# Patient Record
Sex: MALE | Race: WHITE | NOT HISPANIC OR LATINO | Employment: UNEMPLOYED | URBAN - METROPOLITAN AREA
[De-identification: names, ages, dates, MRNs, and addresses within clinical notes are randomized per-mention and may not be internally consistent; named-entity substitution may affect disease eponyms.]

---

## 2023-09-15 ENCOUNTER — OFFICE VISIT (OUTPATIENT)
Dept: PRIMARY CARE | Facility: CLINIC | Age: 58
End: 2023-09-15
Payer: COMMERCIAL

## 2023-09-15 VITALS
SYSTOLIC BLOOD PRESSURE: 130 MMHG | DIASTOLIC BLOOD PRESSURE: 90 MMHG | HEART RATE: 86 BPM | TEMPERATURE: 98.6 F | RESPIRATION RATE: 18 BRPM | WEIGHT: 217 LBS | BODY MASS INDEX: 28.76 KG/M2 | HEIGHT: 73 IN | OXYGEN SATURATION: 96 %

## 2023-09-15 DIAGNOSIS — B37.31 VAGINAL CANDIDIASIS: ICD-10-CM

## 2023-09-15 DIAGNOSIS — M79.10 MUSCLE PAIN: ICD-10-CM

## 2023-09-15 DIAGNOSIS — R06.81 WITNESSED EPISODE OF APNEA: ICD-10-CM

## 2023-09-15 DIAGNOSIS — M62.838 CERVICAL PARASPINOUS MUSCLE SPASM: ICD-10-CM

## 2023-09-15 DIAGNOSIS — N52.9 ERECTILE DYSFUNCTION, UNSPECIFIED ERECTILE DYSFUNCTION TYPE: ICD-10-CM

## 2023-09-15 DIAGNOSIS — I51.9 HEART DISEASE: ICD-10-CM

## 2023-09-15 DIAGNOSIS — E03.9 HYPOTHYROIDISM, UNSPECIFIED TYPE: ICD-10-CM

## 2023-09-15 DIAGNOSIS — M62.838 MUSCLE SPASM: Primary | ICD-10-CM

## 2023-09-15 DIAGNOSIS — I10 PRIMARY HYPERTENSION: ICD-10-CM

## 2023-09-15 DIAGNOSIS — R21 RASH: ICD-10-CM

## 2023-09-15 DIAGNOSIS — F17.210 CIGARETTE NICOTINE DEPENDENCE WITHOUT COMPLICATION: ICD-10-CM

## 2023-09-15 DIAGNOSIS — Z23 FLU VACCINE NEED: ICD-10-CM

## 2023-09-15 DIAGNOSIS — Z23 NEED FOR SHINGLES VACCINE: ICD-10-CM

## 2023-09-15 DIAGNOSIS — R07.9 CHEST PAIN ON EXERTION: ICD-10-CM

## 2023-09-15 DIAGNOSIS — E78.5 HYPERLIPIDEMIA, UNSPECIFIED HYPERLIPIDEMIA TYPE: ICD-10-CM

## 2023-09-15 DIAGNOSIS — Z00.00 ANNUAL PHYSICAL EXAM: ICD-10-CM

## 2023-09-15 DIAGNOSIS — Z12.11 COLON CANCER SCREENING: ICD-10-CM

## 2023-09-15 DIAGNOSIS — Z23 NEED FOR TD VACCINE: ICD-10-CM

## 2023-09-15 PROBLEM — E07.9 THYROID DISEASE: Status: ACTIVE | Noted: 2023-09-15

## 2023-09-15 PROCEDURE — 99386 PREV VISIT NEW AGE 40-64: CPT | Performed by: PHYSICIAN ASSISTANT

## 2023-09-15 PROCEDURE — 3075F SYST BP GE 130 - 139MM HG: CPT | Performed by: PHYSICIAN ASSISTANT

## 2023-09-15 PROCEDURE — 3080F DIAST BP >= 90 MM HG: CPT | Performed by: PHYSICIAN ASSISTANT

## 2023-09-15 PROCEDURE — 90686 IIV4 VACC NO PRSV 0.5 ML IM: CPT | Performed by: PHYSICIAN ASSISTANT

## 2023-09-15 PROCEDURE — 3079F DIAST BP 80-89 MM HG: CPT | Performed by: PHYSICIAN ASSISTANT

## 2023-09-15 PROCEDURE — 90471 IMMUNIZATION ADMIN: CPT | Performed by: PHYSICIAN ASSISTANT

## 2023-09-15 RX ORDER — CYCLOBENZAPRINE HCL 10 MG
10 TABLET ORAL 3 TIMES DAILY PRN
Qty: 30 TABLET | Refills: 3 | Status: SHIPPED | OUTPATIENT
Start: 2023-09-15 | End: 2023-10-25

## 2023-09-15 RX ORDER — NITROGLYCERIN 0.3 MG/1
0.3 TABLET SUBLINGUAL EVERY 5 MIN PRN
COMMUNITY
Start: 2023-01-10 | End: 2023-10-25 | Stop reason: SDUPTHER

## 2023-09-15 RX ORDER — LISINOPRIL 20 MG/1
1 TABLET ORAL DAILY
COMMUNITY
Start: 2023-03-23 | End: 2023-10-25 | Stop reason: SDUPTHER

## 2023-09-15 RX ORDER — FLUCONAZOLE 150 MG/1
TABLET ORAL
Qty: 4 TABLET | Refills: 0 | Status: SHIPPED | OUTPATIENT
Start: 2023-09-15 | End: 2023-10-25

## 2023-09-15 RX ORDER — ATORVASTATIN CALCIUM 80 MG/1
80 TABLET, FILM COATED ORAL DAILY
COMMUNITY
Start: 2023-03-21 | End: 2023-10-25 | Stop reason: SDUPTHER

## 2023-09-15 RX ORDER — ASPIRIN 81 MG/1
81 TABLET ORAL
COMMUNITY
Start: 2023-03-21 | End: 2023-10-25 | Stop reason: SDUPTHER

## 2023-09-15 RX ORDER — LEVOTHYROXINE SODIUM 75 UG/1
75 TABLET ORAL DAILY
COMMUNITY
Start: 2023-03-12 | End: 2023-10-23 | Stop reason: SDUPTHER

## 2023-09-15 ASSESSMENT — ENCOUNTER SYMPTOMS
COUGH: 1
ARTHRALGIAS: 1
DYSPHORIC MOOD: 1
MYALGIAS: 1
SHORTNESS OF BREATH: 1
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
## 2023-09-27 ENCOUNTER — HOSPITAL ENCOUNTER (EMERGENCY)
Age: 58
Discharge: HOME | End: 2023-09-28
Attending: STUDENT IN AN ORGANIZED HEALTH CARE EDUCATION/TRAINING PROGRAM
Payer: COMMERCIAL

## 2023-09-27 DIAGNOSIS — F17.200 HEAVY TOBACCO SMOKER: ICD-10-CM

## 2023-09-27 DIAGNOSIS — J06.9 VIRAL UPPER RESPIRATORY TRACT INFECTION: Primary | ICD-10-CM

## 2023-09-27 DIAGNOSIS — R05.1 ACUTE COUGH: ICD-10-CM

## 2023-09-27 DIAGNOSIS — R06.2 WHEEZING: ICD-10-CM

## 2023-09-27 LAB
INFLUENZA A BY PCR: NEGATIVE
INFLUENZA B BY PCR: NEGATIVE
SARS-COV-2 RDRP RESP QL NAA+PROBE: NOT DETECTED
STREP GRP A PCR: NEGATIVE

## 2023-09-27 PROCEDURE — 99283 EMERGENCY DEPT VISIT LOW MDM: CPT

## 2023-09-27 PROCEDURE — 87651 STREP A DNA AMP PROBE: CPT

## 2023-09-27 PROCEDURE — 87635 SARS-COV-2 COVID-19 AMP PRB: CPT

## 2023-09-27 PROCEDURE — 87502 INFLUENZA DNA AMP PROBE: CPT

## 2023-09-27 ASSESSMENT — PAIN - FUNCTIONAL ASSESSMENT: PAIN_FUNCTIONAL_ASSESSMENT: 0-10

## 2023-09-27 ASSESSMENT — PAIN SCALES - GENERAL: PAINLEVEL_OUTOF10: 3

## 2023-09-27 ASSESSMENT — PAIN DESCRIPTION - DESCRIPTORS: DESCRIPTORS: ACHING

## 2023-09-27 ASSESSMENT — PAIN DESCRIPTION - LOCATION: LOCATION: GENERALIZED

## 2023-09-28 VITALS
BODY MASS INDEX: 28.76 KG/M2 | SYSTOLIC BLOOD PRESSURE: 145 MMHG | TEMPERATURE: 98.6 F | HEIGHT: 73 IN | DIASTOLIC BLOOD PRESSURE: 99 MMHG | HEART RATE: 80 BPM | OXYGEN SATURATION: 98 % | RESPIRATION RATE: 18 BRPM | WEIGHT: 217 LBS

## 2023-09-28 PROCEDURE — 6370000000 HC RX 637 (ALT 250 FOR IP): Performed by: STUDENT IN AN ORGANIZED HEALTH CARE EDUCATION/TRAINING PROGRAM

## 2023-09-28 RX ORDER — ACETAMINOPHEN 500 MG
1000 TABLET ORAL EVERY 6 HOURS PRN
Qty: 60 TABLET | Refills: 0 | Status: SHIPPED | OUTPATIENT
Start: 2023-09-28

## 2023-09-28 RX ORDER — DEXAMETHASONE 6 MG/1
12 TABLET ORAL ONCE
Status: COMPLETED | OUTPATIENT
Start: 2023-09-28 | End: 2023-09-28

## 2023-09-28 RX ORDER — ALBUTEROL SULFATE 90 UG/1
2 AEROSOL, METERED RESPIRATORY (INHALATION) 4 TIMES DAILY PRN
Qty: 18 G | Refills: 0 | Status: SHIPPED | OUTPATIENT
Start: 2023-09-28

## 2023-09-28 RX ORDER — DOXYCYCLINE HYCLATE 100 MG/1
100 CAPSULE ORAL ONCE
Status: COMPLETED | OUTPATIENT
Start: 2023-09-28 | End: 2023-09-28

## 2023-09-28 RX ORDER — ALBUTEROL SULFATE 90 UG/1
2 AEROSOL, METERED RESPIRATORY (INHALATION) ONCE
Status: SHIPPED | OUTPATIENT
Start: 2023-09-28

## 2023-09-28 RX ORDER — IBUPROFEN 800 MG/1
400 TABLET ORAL ONCE
Status: COMPLETED | OUTPATIENT
Start: 2023-09-28 | End: 2023-09-28

## 2023-09-28 RX ORDER — PREDNISONE 20 MG/1
60 TABLET ORAL DAILY
Qty: 15 TABLET | Refills: 0 | Status: SHIPPED | OUTPATIENT
Start: 2023-09-28 | End: 2023-10-03

## 2023-09-28 RX ORDER — ALBUTEROL SULFATE 90 UG/1
2 AEROSOL, METERED RESPIRATORY (INHALATION) ONCE
Status: COMPLETED | OUTPATIENT
Start: 2023-09-28 | End: 2023-09-28

## 2023-09-28 RX ORDER — GUAIFENESIN/DEXTROMETHORPHAN 100-10MG/5
5 SYRUP ORAL 3 TIMES DAILY PRN
Qty: 120 ML | Refills: 0 | Status: SHIPPED | OUTPATIENT
Start: 2023-09-28 | End: 2023-10-08

## 2023-09-28 RX ORDER — DOXYCYCLINE HYCLATE 100 MG
100 TABLET ORAL 2 TIMES DAILY
Qty: 10 TABLET | Refills: 0 | Status: SHIPPED | OUTPATIENT
Start: 2023-09-28 | End: 2023-10-03

## 2023-09-28 RX ORDER — IBUPROFEN 400 MG/1
400 TABLET ORAL EVERY 6 HOURS PRN
Qty: 120 TABLET | Refills: 0 | Status: SHIPPED | OUTPATIENT
Start: 2023-09-28

## 2023-09-28 RX ORDER — ACETAMINOPHEN 500 MG
1000 TABLET ORAL ONCE
Status: COMPLETED | OUTPATIENT
Start: 2023-09-28 | End: 2023-09-28

## 2023-09-28 RX ORDER — GUAIFENESIN/DEXTROMETHORPHAN 100-10MG/5
5 SYRUP ORAL ONCE
Status: COMPLETED | OUTPATIENT
Start: 2023-09-28 | End: 2023-09-28

## 2023-09-28 RX ADMIN — IBUPROFEN 400 MG: 800 TABLET, FILM COATED ORAL at 00:32

## 2023-09-28 RX ADMIN — ALUMINUM HYDROXIDE, MAGNESIUM HYDROXIDE, AND SIMETHICONE: 200; 200; 20 SUSPENSION ORAL at 00:36

## 2023-09-28 RX ADMIN — ALBUTEROL SULFATE 2 PUFF: 90 AEROSOL, METERED RESPIRATORY (INHALATION) at 01:06

## 2023-09-28 RX ADMIN — ACETAMINOPHEN 1000 MG: 500 TABLET ORAL at 00:34

## 2023-09-28 RX ADMIN — DOXYCYCLINE HYCLATE 100 MG: 100 CAPSULE ORAL at 00:34

## 2023-09-28 RX ADMIN — GUAIFENESIN SYRUP AND DEXTROMETHORPHAN 5 ML: 100; 10 SYRUP ORAL at 00:35

## 2023-09-28 RX ADMIN — DEXAMETHASONE 12 MG: 6 TABLET ORAL at 00:34

## 2023-09-28 ASSESSMENT — PAIN DESCRIPTION - LOCATION: LOCATION: GENERALIZED

## 2023-09-28 ASSESSMENT — PAIN SCALES - GENERAL: PAINLEVEL_OUTOF10: 3

## 2023-09-28 NOTE — ED TRIAGE NOTES
Pt reports generalized illness (cough, congestion, body aches, chest congestion.) States he would like \"to rule out Covid\". No medications taken PTA, pt states, \"I just like to let nature take its course\".

## 2023-09-28 NOTE — ED NOTES
Patient alert and oriented at time of discharge. Patient skin p,w,d. Patient respirations are even and unlabored at time of discharge. Patient able to speak in complete sentences with no difficulty at time of discharge. Discharge instructions reviewed with patient. Patient verbalized understanding and states no questions at this time. Patient ambulatory on discharge.       Magda Enciso RN  09/28/23 8664

## 2023-10-04 ENCOUNTER — HOSPITAL ENCOUNTER (EMERGENCY)
Age: 58
Discharge: LEFT BEFORE DISCHARGE | End: 2023-10-04
Payer: COMMERCIAL

## 2023-10-04 VITALS
RESPIRATION RATE: 18 BRPM | HEART RATE: 92 BPM | OXYGEN SATURATION: 97 % | TEMPERATURE: 98.5 F | SYSTOLIC BLOOD PRESSURE: 180 MMHG | DIASTOLIC BLOOD PRESSURE: 81 MMHG

## 2023-10-04 LAB — STREP GRP A PCR: NEGATIVE

## 2023-10-04 PROCEDURE — 87651 STREP A DNA AMP PROBE: CPT

## 2023-10-04 ASSESSMENT — PAIN DESCRIPTION - ONSET: ONSET: ON-GOING

## 2023-10-04 ASSESSMENT — PAIN DESCRIPTION - FREQUENCY: FREQUENCY: CONTINUOUS

## 2023-10-04 ASSESSMENT — PAIN DESCRIPTION - LOCATION: LOCATION: OTHER (COMMENT)

## 2023-10-04 ASSESSMENT — PAIN SCALES - GENERAL: PAINLEVEL_OUTOF10: 3

## 2023-10-04 ASSESSMENT — PAIN DESCRIPTION - DESCRIPTORS: DESCRIPTORS: DISCOMFORT

## 2023-10-05 NOTE — ED NOTES
Pt c/o sore throat, and tongue splitting on the top of tongue.      Mark Anthony Blackburn  10/04/23 2038

## 2023-10-05 NOTE — ED TRIAGE NOTES
A & Ox4. Skin pink warm and dry. Pts uvula is red and swollen. Able to swallow. Also noted to have a small fissure down center of tongue. No bleeding noted.

## 2023-10-06 NOTE — ED PROVIDER NOTES
[facility] ED  eMERGENCY dEPARTMENT eNCOUnter      Pt Name: Rik Sargent  MRN: [id number]  [address] [facility] 1965  Date of evaluation: 9/27/2023  Provider: Shameka Laboy MD    HISTORY OF PRESENT ILLNESS      Chief Complaint   Patient presents with    Generalized Body Aches    general illness     Pt asking for Covid test       The history is provided by the Patient. Rik Sargent is a 62 y.o. male with a PMH clinically significant for Tobacco smoking, HTN, HLD, and Hypothyroidism presenting to the ED via PV c/o generalized illness with nonproductive cough, rinhorrhea, congestion, sore throat and generalized maylgias/fatigue. States symptoms onset just yesterday. Would like to make sure he doesn't have covid. Denies any associated: F/C/D, HA, Neck pain/stiffness, Lightheadedness, Dizziness, Hemoptysis, SOB, CP, Abd pain, N/V/D/C, Dysuria, Hematuria, Difficulty urinating, or Rash. Alleviating Factors: Nothing, nothing yet tried for relief PTA. States they have otherwise been feeling well. Per Chart Review: PMH as noted above obtained via outpatient chart review. REVIEW OF SYSTEMS       Review of Systems  Otherwise as noted in HPI    PAST MEDICAL HISTORY     Past Medical History:   Diagnosis Date    Hyperlipidemia     Hypertension     Thyroid disease        SURGICAL HISTORY       Past Surgical History:   Procedure Laterality Date    HERNIA REPAIR         FAMILY HISTORY     History reviewed. No pertinent family history.     SOCIAL HISTORY       Social History     Socioeconomic History    Marital status:      Spouse name: None    Number of children: None    Years of education: None    Highest education level: None   Tobacco Use    Smoking status: Every Day     Packs/day: .5     Types: Cigarettes    Smokeless tobacco: Never   Substance and Sexual Activity    Alcohol use: Never    Drug use: Never       CURRENT MEDICATIONS       Discharge Medication List as of 9/28/2023 12:29 AM

## 2023-10-14 ENCOUNTER — HOSPITAL ENCOUNTER (EMERGENCY)
Age: 58
Discharge: HOME | End: 2023-10-14
Attending: EMERGENCY MEDICINE
Payer: COMMERCIAL

## 2023-10-14 VITALS
HEIGHT: 73 IN | HEART RATE: 86 BPM | SYSTOLIC BLOOD PRESSURE: 136 MMHG | OXYGEN SATURATION: 99 % | DIASTOLIC BLOOD PRESSURE: 89 MMHG | BODY MASS INDEX: 28.89 KG/M2 | RESPIRATION RATE: 17 BRPM | TEMPERATURE: 98.4 F | WEIGHT: 218 LBS
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
## 2023-10-14 DIAGNOSIS — S01.511A LIP LACERATION, INITIAL ENCOUNTER: Primary | ICD-10-CM

## 2023-10-14 PROCEDURE — 6370000000 HC RX 637 (ALT 250 FOR IP): Performed by: EMERGENCY MEDICINE

## 2023-10-14 PROCEDURE — 99283 EMERGENCY DEPT VISIT LOW MDM: CPT

## 2023-10-14 PROCEDURE — 12011 RPR F/E/E/N/L/M 2.5 CM/<: CPT

## 2023-10-14 RX ORDER — BACITRACIN ZINC 500 [USP'U]/G
OINTMENT TOPICAL ONCE
Status: COMPLETED | OUTPATIENT
Start: 2023-10-14 | End: 2023-10-14

## 2023-10-14 RX ORDER — CLINDAMYCIN HYDROCHLORIDE 300 MG/1
300 CAPSULE ORAL 3 TIMES DAILY
Qty: 21 CAPSULE | Refills: 0 | Status: SHIPPED | OUTPATIENT
Start: 2023-10-14 | End: 2023-10-21

## 2023-10-14 RX ADMIN — BACITRACIN ZINC: 500 OINTMENT TOPICAL at 05:24

## 2023-10-14 ASSESSMENT — ENCOUNTER SYMPTOMS
VOMITING: 0
CHEST TIGHTNESS: 0
ABDOMINAL PAIN: 0
PHOTOPHOBIA: 0
WHEEZING: 0
COUGH: 0
EYE DISCHARGE: 0
SORE THROAT: 0
SHORTNESS OF BREATH: 0
ABDOMINAL DISTENTION: 0

## 2023-10-14 ASSESSMENT — LIFESTYLE VARIABLES
HOW MANY STANDARD DRINKS CONTAINING ALCOHOL DO YOU HAVE ON A TYPICAL DAY: PATIENT DOES NOT DRINK
HOW OFTEN DO YOU HAVE A DRINK CONTAINING ALCOHOL: NEVER

## 2023-10-14 NOTE — ED PROVIDER NOTES
[facility] ED  eMERGENCY dEPARTMENT eNCOUnter      Pt Name: Georgeanna Moritz  MRN: [id number]  [address] [facility] [place] 1965  Date of evaluation: 10/14/2023  Provider: Brigido Martins MD    CHIEF COMPLAINT       Chief Complaint   Patient presents with    Animal Bite     Lip          HISTORY OF PRESENT ILLNESS   (Location/Symptom, Timing/Onset,Context/Setting, Quality, Duration, Modifying Factors, Severity)  Note limiting factors. Georgeanna Moritz is a 62 y.o. male who presents to the emergency department for laceration. Patient was \"messing around\" with his dog. Dog nipped at him and sustained a 2.1 cm laceration. This occurred just prior to arrival.  Dog has immunizations lives in the house. He actually states he was blowing in the dog's ear and the dog just responded with a nip. HPI    NursingNotes were reviewed. REVIEW OF SYSTEMS    (2-9 systems for level 4, 10 or more for level 5)     Review of Systems   Constitutional:  Negative for chills and diaphoresis. HENT:  Negative for congestion, ear pain, mouth sores and sore throat. Eyes:  Negative for photophobia and discharge. Respiratory:  Negative for cough, chest tightness, shortness of breath and wheezing. Cardiovascular:  Negative for chest pain and palpitations. Gastrointestinal:  Negative for abdominal distention, abdominal pain and vomiting. Endocrine: Negative for cold intolerance. Genitourinary:  Negative for difficulty urinating. Musculoskeletal:  Negative for arthralgias. Skin:  Positive for wound. Negative for pallor and rash. Allergic/Immunologic: Negative for immunocompromised state. Neurological:  Negative for dizziness and syncope. Hematological:  Negative for adenopathy. Psychiatric/Behavioral:  Negative for agitation and hallucinations. All other systems reviewed and are negative. Except as noted above the remainder of the review of systems was reviewed and negative.        PAST MEDICAL HISTORY     Past confirmed:  Verbally with patient  Anesthesia:     Anesthesia method:  Local infiltration    Local anesthetic:  Lidocaine 1% WITH epi  Laceration details:     Location:  Lip    Lip location:  Lower exterior lip    Length (cm):  2.1  Pre-procedure details:     Preparation:  Patient was prepped and draped in usual sterile fashion  Exploration:     Limited defect created (wound extended): no      Contaminated: no    Treatment:     Area cleansed with:  Saline    Amount of cleaning:  Extensive    Irrigation solution:  Sterile saline    Irrigation method:  Syringe    Visualized foreign bodies/material removed: no      Debridement:  None    Undermining:  None    Scar revision: no    Skin repair:     Repair method:  Sutures    Suture size:  5-0    Suture material:  Nylon    Suture technique:  Simple interrupted    Number of sutures:  5  Approximation:     Approximation:  Close    Vermilion border well-aligned: yes    Repair type:     Repair type:  Simple  Post-procedure details:     Dressing:  Antibiotic ointment    Procedure completion:  Tolerated      FINAL IMPRESSION      1. Lip laceration, initial encounter    2.  Dog bite, initial encounter          DISPOSITION/PLAN   DISPOSITION Decision To Discharge 10/14/2023 05:13:27 AM      PATIENT REFERRED TO:  CRISTIAN Jhaveri  [address] [address]  [phone]    In 6 days        DISCHARGE MEDICATIONS:  New Prescriptions    CLINDAMYCIN (CLEOCIN) 300 MG CAPSULE    Take 1 capsule by mouth 3 times daily for 7 days          (Please note that portions of this note were completed with a voice recognition program.  Efforts were made to edit the dictations but occasionally words are mis-transcribed.)    Cain Mederos MD (electronically signed)  Attending Emergency Physician         Cain Mederos MD  10/14/23 2306       Cain Mederos MD  10/14/23 3903

## 2023-10-14 NOTE — ED TRIAGE NOTES
Pt arrived to triage via private vehicle. Pt c/o dog bite. Pt states he was messing with his dog and he bit him in the face. Pt has laceration to lip. Pt states tetanus was last given a few months ago.

## 2023-10-21 ENCOUNTER — HOSPITAL ENCOUNTER (EMERGENCY)
Age: 58
Discharge: LEFT BEFORE DISCHARGE | End: 2023-10-22
Payer: COMMERCIAL

## 2023-10-21 DIAGNOSIS — R07.9 CHEST PAIN, UNSPECIFIED TYPE: Primary | ICD-10-CM

## 2023-10-21 DIAGNOSIS — R06.2 WHEEZING: ICD-10-CM

## 2023-10-21 DIAGNOSIS — K21.9 GASTROESOPHAGEAL REFLUX DISEASE WITHOUT ESOPHAGITIS: ICD-10-CM

## 2023-10-21 PROCEDURE — 93005 ELECTROCARDIOGRAM TRACING: CPT | Performed by: STUDENT IN AN ORGANIZED HEALTH CARE EDUCATION/TRAINING PROGRAM

## 2023-10-21 PROCEDURE — 94640 AIRWAY INHALATION TREATMENT: CPT

## 2023-10-21 PROCEDURE — 84439 ASSAY OF FREE THYROXINE: CPT

## 2023-10-21 PROCEDURE — 36415 COLL VENOUS BLD VENIPUNCTURE: CPT

## 2023-10-21 PROCEDURE — 84484 ASSAY OF TROPONIN QUANT: CPT

## 2023-10-21 PROCEDURE — 84443 ASSAY THYROID STIM HORMONE: CPT

## 2023-10-21 PROCEDURE — 83690 ASSAY OF LIPASE: CPT

## 2023-10-21 PROCEDURE — 99285 EMERGENCY DEPT VISIT HI MDM: CPT

## 2023-10-21 PROCEDURE — 80053 COMPREHEN METABOLIC PANEL: CPT

## 2023-10-21 PROCEDURE — 6370000000 HC RX 637 (ALT 250 FOR IP): Performed by: PERSONAL EMERGENCY RESPONSE ATTENDANT

## 2023-10-21 PROCEDURE — 85025 COMPLETE CBC W/AUTO DIFF WBC: CPT

## 2023-10-21 RX ORDER — IPRATROPIUM BROMIDE AND ALBUTEROL SULFATE 2.5; .5 MG/3ML; MG/3ML
1 SOLUTION RESPIRATORY (INHALATION) CONTINUOUS PRN
Status: DISCONTINUED | OUTPATIENT
Start: 2023-10-21 | End: 2023-10-22 | Stop reason: HOSPADM

## 2023-10-21 RX ADMIN — ALUMINUM HYDROXIDE, MAGNESIUM HYDROXIDE, AND SIMETHICONE: 200; 200; 20 SUSPENSION ORAL at 23:43

## 2023-10-21 RX ADMIN — IPRATROPIUM BROMIDE AND ALBUTEROL SULFATE 1 DOSE: .5; 2.5 SOLUTION RESPIRATORY (INHALATION) at 23:40

## 2023-10-21 ASSESSMENT — ENCOUNTER SYMPTOMS
WHEEZING: 1
NAUSEA: 0
SHORTNESS OF BREATH: 1
RHINORRHEA: 0
DIARRHEA: 0
VOMITING: 0
COLOR CHANGE: 0
COUGH: 0
BLOOD IN STOOL: 0
SORE THROAT: 0
ABDOMINAL PAIN: 0

## 2023-10-22 ENCOUNTER — APPOINTMENT (OUTPATIENT)
Dept: GENERAL RADIOLOGY | Age: 58
End: 2023-10-22
Payer: COMMERCIAL

## 2023-10-22 VITALS
SYSTOLIC BLOOD PRESSURE: 129 MMHG | BODY MASS INDEX: 29.03 KG/M2 | DIASTOLIC BLOOD PRESSURE: 90 MMHG | OXYGEN SATURATION: 96 % | WEIGHT: 220 LBS | RESPIRATION RATE: 17 BRPM | TEMPERATURE: 97.9 F | HEART RATE: 97 BPM

## 2023-10-22 LAB
ALBUMIN SERPL-MCNC: 4 G/DL (ref 3.5–4.6)
ALP SERPL-CCNC: 50 U/L (ref 35–104)
ALT SERPL-CCNC: 54 U/L (ref 0–41)
ANION GAP SERPL CALCULATED.3IONS-SCNC: 11 MEQ/L (ref 9–15)
AST SERPL-CCNC: 37 U/L (ref 0–40)
BASOPHILS # BLD: 0 K/UL (ref 0–0.2)
BASOPHILS NFR BLD: 0.3 %
BILIRUB SERPL-MCNC: <0.2 MG/DL (ref 0.2–0.7)
BUN SERPL-MCNC: 14 MG/DL (ref 6–20)
CALCIUM SERPL-MCNC: 9.2 MG/DL (ref 8.5–9.9)
CHLORIDE SERPL-SCNC: 105 MEQ/L (ref 95–107)
CO2 SERPL-SCNC: 25 MEQ/L (ref 20–31)
CREAT SERPL-MCNC: 0.86 MG/DL (ref 0.7–1.2)
EOSINOPHIL # BLD: 0.7 K/UL (ref 0–0.7)
EOSINOPHIL NFR BLD: 7.5 %
ERYTHROCYTE [DISTWIDTH] IN BLOOD BY AUTOMATED COUNT: 14.2 % (ref 11.5–14.5)
GLOBULIN SER CALC-MCNC: 3.1 G/DL (ref 2.3–3.5)
GLUCOSE SERPL-MCNC: 173 MG/DL (ref 70–99)
HCT VFR BLD AUTO: 45.3 % (ref 42–52)
HGB BLD-MCNC: 14.5 G/DL (ref 14–18)
LIPASE SERPL-CCNC: 64 U/L (ref 12–95)
LYMPHOCYTES # BLD: 2.5 K/UL (ref 1–4.8)
LYMPHOCYTES NFR BLD: 27 %
MCH RBC QN AUTO: 30.7 PG (ref 27–31.3)
MCHC RBC AUTO-ENTMCNC: 32 % (ref 33–37)
MCV RBC AUTO: 96 FL (ref 79–92.2)
MONOCYTES # BLD: 1 K/UL (ref 0.2–0.8)
MONOCYTES NFR BLD: 11.3 %
NEUTROPHILS # BLD: 4.9 K/UL (ref 1.4–6.5)
NEUTS SEG NFR BLD: 53.2 %
PLATELET # BLD AUTO: 272 K/UL (ref 130–400)
POTASSIUM SERPL-SCNC: 4.5 MEQ/L (ref 3.4–4.9)
PROT SERPL-MCNC: 7.1 G/DL (ref 6.3–8)
RBC # BLD AUTO: 4.72 M/UL (ref 4.7–6.1)
SODIUM SERPL-SCNC: 141 MEQ/L (ref 135–144)
T4 FREE SERPL-MCNC: 1.02 NG/DL (ref 0.84–1.68)
TROPONIN, HIGH SENSITIVITY: <6 NG/L (ref 0–19)
TROPONIN, HIGH SENSITIVITY: <6 NG/L (ref 0–19)
TSH REFLEX: 13.93 UIU/ML (ref 0.44–3.86)
WBC # BLD AUTO: 9.2 K/UL (ref 4.8–10.8)

## 2023-10-22 PROCEDURE — 71045 X-RAY EXAM CHEST 1 VIEW: CPT

## 2023-10-22 PROCEDURE — 84484 ASSAY OF TROPONIN QUANT: CPT

## 2023-10-22 RX ORDER — ALBUTEROL SULFATE 90 UG/1
2 AEROSOL, METERED RESPIRATORY (INHALATION) 4 TIMES DAILY PRN
Qty: 54 G | Refills: 0 | Status: SHIPPED | OUTPATIENT
Start: 2023-10-22

## 2023-10-22 NOTE — ED PROVIDER NOTES
Tenderness: There is no abdominal tenderness. There is no guarding or rebound. Musculoskeletal:         General: Normal range of motion. Cervical back: Normal range of motion and neck supple. Skin:     General: Skin is warm and dry. Capillary Refill: Capillary refill takes less than 2 seconds. Findings: No rash. Neurological:      Mental Status: He is alert and oriented to person, place, and time. Deep Tendon Reflexes: Reflexes are normal and symmetric. Psychiatric:         Behavior: Behavior normal.         Thought Content: Thought content normal.         Judgment: Judgment normal.         DIAGNOSTIC RESULTS     EKG:All EKG's are interpreted by the Emergency Department Physician who either signs or Co-signs this chart in the absence of a cardiologist.    Personal interpretation:    Normal sinus rhythm, rate 87, normal intervals, no ST segment changes    RADIOLOGY:   Non-plain film images such as CT, Ultrasound and MRI are read by theradiologist. Plain radiographic images are visualized and preliminarily interpreted by the emergency physician with the below findings:    Interpretation per theRadiologist below, if available at the time of this note:    XR CHEST PORTABLE   Final Result   No acute process. LABS:  Labs Reviewed   CBC WITH AUTO DIFFERENTIAL   COMPREHENSIVE METABOLIC PANEL   TROPONIN   LIPASE   TSH WITH REFLEX   TROPONIN       All other labs were within normal range or not returned as of this dictation.     EMERGENCY DEPARTMENT COURSE and DIFFERENTIAL DIAGNOSIS/MDM:   Vitals:    Vitals:    10/21/23 2342 10/22/23 0000 10/22/23 0030 10/22/23 0100   BP:  128/81 (!) 152/99 (!) 129/90   Pulse: 83 95 93 97   Resp: 19   17   Temp:       TempSrc:       SpO2: 97% 95% 95% 96%   Weight:             MDM    Pt presents with midsternal chest burning/aching pain with radiation to left shoulder and forearm with shortness of breath and intermittent wheezing at home    Radiologist

## 2023-10-22 NOTE — ED NOTES
Pt stating his pain is gone and he would like to leave. Provider made aware.       Avis Ny RN  10/22/23 2573

## 2023-10-22 NOTE — ED NOTES
Patient states that he has had burning chest pain for 3 weeks. Denies cardiac history but is supposed to see a cardiologist soon. Resp even and unlabored. Skin warm, dry and intact. He is alert and oriented x4.      Kerri Gary RN  10/21/23 2034

## 2023-10-23 ENCOUNTER — TELEPHONE (OUTPATIENT)
Dept: PRIMARY CARE | Facility: CLINIC | Age: 58
End: 2023-10-23

## 2023-10-23 DIAGNOSIS — E03.9 HYPOTHYROIDISM, UNSPECIFIED TYPE: Primary | ICD-10-CM

## 2023-10-23 LAB
EKG ATRIAL RATE: 87 BPM
EKG P AXIS: 38 DEGREES
EKG P-R INTERVAL: 144 MS
EKG Q-T INTERVAL: 344 MS
EKG QRS DURATION: 82 MS
EKG QTC CALCULATION (BAZETT): 413 MS
EKG R AXIS: -19 DEGREES
EKG T AXIS: 18 DEGREES
EKG VENTRICULAR RATE: 87 BPM

## 2023-10-23 PROCEDURE — 93010 ELECTROCARDIOGRAM REPORT: CPT | Performed by: INTERNAL MEDICINE

## 2023-10-23 RX ORDER — KETOCONAZOLE 20 MG/ML
1 SHAMPOO, SUSPENSION TOPICAL DAILY
COMMUNITY
Start: 2023-10-05 | End: 2023-10-25

## 2023-10-23 RX ORDER — ALBUTEROL SULFATE 90 UG/1
1 POWDER, METERED RESPIRATORY (INHALATION) DAILY PRN
COMMUNITY
Start: 2023-03-23 | End: 2023-10-25

## 2023-10-23 RX ORDER — FLUTICASONE PROPIONATE AND SALMETEROL XINAFOATE 45; 21 UG/1; UG/1
2 AEROSOL, METERED RESPIRATORY (INHALATION) 2 TIMES DAILY PRN
COMMUNITY
Start: 2022-12-22

## 2023-10-23 RX ORDER — PANTOPRAZOLE SODIUM 20 MG/1
40 TABLET, DELAYED RELEASE ORAL DAILY PRN
COMMUNITY
Start: 2023-03-02 | End: 2023-11-27

## 2023-10-23 RX ORDER — IBUPROFEN 400 MG/1
400 TABLET ORAL EVERY 6 HOURS PRN
COMMUNITY
Start: 2023-09-28 | End: 2023-11-27

## 2023-10-23 RX ORDER — SIMVASTATIN 20 MG/1
20 TABLET, FILM COATED ORAL NIGHTLY
COMMUNITY
Start: 2022-11-22 | End: 2023-10-25

## 2023-10-23 RX ORDER — METOPROLOL SUCCINATE 25 MG/1
25 TABLET, EXTENDED RELEASE ORAL DAILY
COMMUNITY
Start: 2022-12-05 | End: 2023-10-25

## 2023-10-23 RX ORDER — LEVOTHYROXINE SODIUM 88 UG/1
88 TABLET ORAL DAILY
Qty: 60 TABLET | Refills: 0 | Status: SHIPPED | OUTPATIENT
Start: 2023-10-23

## 2023-10-23 RX ORDER — ACETAMINOPHEN 500 MG
1000 TABLET ORAL EVERY 6 HOURS PRN
COMMUNITY
Start: 2023-09-28 | End: 2023-11-27

## 2023-10-23 RX ORDER — METOPROLOL SUCCINATE 50 MG/1
50 TABLET, EXTENDED RELEASE ORAL DAILY
COMMUNITY
Start: 2023-03-06 | End: 2023-10-25

## 2023-10-25 ENCOUNTER — OFFICE VISIT (OUTPATIENT)
Dept: CARDIOLOGY | Facility: CLINIC | Age: 58
End: 2023-10-25
Payer: COMMERCIAL

## 2023-10-25 VITALS
HEART RATE: 94 BPM | DIASTOLIC BLOOD PRESSURE: 83 MMHG | WEIGHT: 225 LBS | BODY MASS INDEX: 29.69 KG/M2 | SYSTOLIC BLOOD PRESSURE: 137 MMHG

## 2023-10-25 DIAGNOSIS — E07.9 THYROID DISEASE: ICD-10-CM

## 2023-10-25 DIAGNOSIS — R09.89 BILATERAL CAROTID BRUITS: ICD-10-CM

## 2023-10-25 DIAGNOSIS — R00.2 PALPITATIONS: ICD-10-CM

## 2023-10-25 DIAGNOSIS — M79.602 PAIN OF LEFT UPPER EXTREMITY: ICD-10-CM

## 2023-10-25 DIAGNOSIS — R73.9 HYPERGLYCEMIA: ICD-10-CM

## 2023-10-25 DIAGNOSIS — I10 HYPERTENSION, UNSPECIFIED TYPE: Primary | ICD-10-CM

## 2023-10-25 DIAGNOSIS — M54.10 RADICULAR PAIN: ICD-10-CM

## 2023-10-25 DIAGNOSIS — M54.2 NECK PAIN: ICD-10-CM

## 2023-10-25 DIAGNOSIS — R06.83 SNORES: ICD-10-CM

## 2023-10-25 DIAGNOSIS — E03.9 HYPOTHYROIDISM, UNSPECIFIED TYPE: ICD-10-CM

## 2023-10-25 DIAGNOSIS — I25.2 HISTORY OF ACUTE ANTERIOR WALL MI: ICD-10-CM

## 2023-10-25 DIAGNOSIS — R94.31 ABNORMAL EKG: ICD-10-CM

## 2023-10-25 DIAGNOSIS — R07.9 CHEST PAIN, UNSPECIFIED TYPE: ICD-10-CM

## 2023-10-25 DIAGNOSIS — E78.2 MIXED HYPERLIPIDEMIA: ICD-10-CM

## 2023-10-25 DIAGNOSIS — Z82.49 FAMILY HISTORY OF ISCHEMIC HEART DISEASE: ICD-10-CM

## 2023-10-25 DIAGNOSIS — G47.33 OSA (OBSTRUCTIVE SLEEP APNEA): ICD-10-CM

## 2023-10-25 DIAGNOSIS — Z72.0 TOBACCO ABUSE: ICD-10-CM

## 2023-10-25 DIAGNOSIS — M12.812 ROTATOR CUFF ARTHROPATHY, LEFT: ICD-10-CM

## 2023-10-25 DIAGNOSIS — R06.09 DOE (DYSPNEA ON EXERTION): ICD-10-CM

## 2023-10-25 PROCEDURE — 3075F SYST BP GE 130 - 139MM HG: CPT | Performed by: INTERNAL MEDICINE

## 2023-10-25 PROCEDURE — 93000 ELECTROCARDIOGRAM COMPLETE: CPT | Performed by: INTERNAL MEDICINE

## 2023-10-25 PROCEDURE — 99205 OFFICE O/P NEW HI 60 MIN: CPT | Performed by: INTERNAL MEDICINE

## 2023-10-25 PROCEDURE — 3079F DIAST BP 80-89 MM HG: CPT | Performed by: INTERNAL MEDICINE

## 2023-10-25 RX ORDER — LISINOPRIL 20 MG/1
20 TABLET ORAL DAILY
Qty: 90 TABLET | Refills: 2 | Status: SHIPPED | OUTPATIENT
Start: 2023-10-25 | End: 2023-11-27 | Stop reason: SDUPTHER

## 2023-10-25 RX ORDER — NITROGLYCERIN 0.3 MG/1
0.3 TABLET SUBLINGUAL EVERY 5 MIN PRN
Qty: 25 TABLET | Refills: 5 | Status: SHIPPED | OUTPATIENT
Start: 2023-10-25 | End: 2023-10-31 | Stop reason: DRUGHIGH

## 2023-10-25 RX ORDER — ASPIRIN 81 MG/1
81 TABLET ORAL
Qty: 90 TABLET | Refills: 1 | Status: SHIPPED | OUTPATIENT
Start: 2023-10-25 | End: 2023-11-27 | Stop reason: SDUPTHER

## 2023-10-25 RX ORDER — ATORVASTATIN CALCIUM 80 MG/1
80 TABLET, FILM COATED ORAL DAILY
Qty: 90 TABLET | Refills: 2 | Status: SHIPPED | OUTPATIENT
Start: 2023-10-25 | End: 2023-11-27 | Stop reason: SDUPTHER

## 2023-10-25 RX ORDER — ISOSORBIDE MONONITRATE 30 MG/1
30 TABLET, EXTENDED RELEASE ORAL DAILY
Qty: 90 TABLET | Refills: 1 | Status: SHIPPED | OUTPATIENT
Start: 2023-10-25 | End: 2023-11-27 | Stop reason: SDUPTHER

## 2023-10-25 NOTE — PATIENT INSTRUCTIONS
Start Isosorbide 30 mg daily  Start Metoprolol Succinate 25 mg daily  Continue  Aspirin 81 mg daily

## 2023-10-25 NOTE — PROGRESS NOTES
CARDIOLOGY CONSULTALTION NOTE    Date:   10/25/2023    Patient:    Liban Jaramillo    YOB: 1965    Primary Physician: Joselin Simon PA-C       Reason for Visit: Chest pain symptoms, new patient consultation       HPI:     Liban Jaramillo was seen in cardiac evaluation at the  Cardiology office October 25, 2023.      The patients problems are listed as in the impression below.    Electronic medical records reviewed.    Patient is a pleasant 58-year-old hypertensive, hyperlipidemic gentleman without prior significant cardiovascular history but strong family history of early coronary artery disease.  We are asked to see as a new consultation by Dr. Olivera given the above findings and history.    He states that over the last 7 months he has noted increasing symptoms of chest pain and dyspnea on exertion.  He does have a rotator cuff injury left arm.  He has tried nitroglycerin for his symptoms with some relief.  He had increased wheezing along with his chest pain at times.  He notes snoring.  He has had palpitations.  He has had no syncope or presyncope.  He has no other cardiovascular complaints.    Given his chest pain symptoms recurrence he was evaluated at [facility] 10/23/2023.  Laboratory studies were unremarkable except for his elevated TSH.  He had been evaluated also in September with a negative COVID testing.    He still has the symptoms.  Were asked to see after evaluation.    Patient denies Chest Pain, SOB, Lightheadedness, Dizziness, TIA or CVA symptoms.  No CHF or Edema.  No Palpitations.  No GI,  or Bleeding Issues. No Recent Fever or Chills.     Cardiovascular and general review of systems is otherwise negative.    A 14-system review is otherwise negative, other than noted.     PHYSICAL EXAMINATION:      137/83.  Rate 94.    General: No acute distress. Vital signs as noted. Alert and oriented.  Head And Neck Examination: No jugular venous distention, 2/6 bilateral carotid  bruits, no mass. Carotid upstrokes preserved. Oral mucosa moist.  No xanthelasma. Head and neck examination otherwise unremarkable.  Lungs: Clear to auscultation and percussion. No wheezes, no rales,  and no rhonchi.  Chest: Excursion appeared to be normal. No chest wall tenderness on palpation.  Heart: Normal S1 and S2. No S3. No S4. No rub. Grade 1/6 systolic murmur, best heard at the left sternal border. Point of maximal impulse was within normal limits.  Abdomen: Soft. Nontender. No organomegaly. No bruits. No masses. Obese.  Extremities: No bipedal edema. No clubbing. No cyanosis.  Pulses are strong throughout. No bruits.  Musculoskeletal Exam: No ulcers, otherwise unremarkable.  Neuro: Neurologically appeared grossly intact.  .  IMPRESSION:      Chest pain symptoms, concerning for angina  Dyspnea on exertion  Palpitations  Snoring  Carotid bruits  Abnormal ECG concerning for prior MI  Hypertension  Hyperlipidemia  Hypothyroidism recently diagnosed  Hyperglycemic  Cervical disc disease with radiculopathy.  Alcohol history.  Tobacco history  Family history of coronary artery disease  Otherwise as per assessment below.      RECOMMENDATIONS:      Patient has above-noted symptoms and findings of.  Would suggest further cardiac evaluation to exclude cardiovascular disease as a cause of his symptomatology: Echocardiogram, carotid ultrasound, cardiac catheterization.  Risk goals and alternatives of the procedure were discussed in detail the patient.  He understood and wished to proceed.  Informed consents were obtained.    Would suggest Imdur 30 mg daily, Toprol-XL 25 mg daily, enteric-coated aspirin 81 mg daily and nitroglycerin sublingual was provided.    He will continue his other medications.  Refills were provided.    It was encouraged to discontinue smoking.    [place] portal use was encouraged.    We will plan to see back following the above with repeat laboratory Studies and ECG as ordered.     Patient will  follow up with their primary physician for general care.    The patient knows to contact medical care earlier if need be.      ALLERGIES:     Allergies   Allergen Reactions    Penicillins Anaphylaxis, Hives, Rash and Swelling        MEDICATIONS:     Current Outpatient Medications   Medication Instructions    acetaminophen (TYLENOL) 1,000 mg, oral, Every 6 hours PRN    Advair HFA 45-21 mcg/actuation inhaler 2 puffs, inhalation, 2 times daily RT    aspirin 81 mg, oral, Daily before breakfast    atorvastatin (LIPITOR) 80 mg, oral, Daily    cyclobenzaprine (FLEXERIL) 10 mg, oral, 3 times daily PRN    fluconazole (Diflucan) 150 mg tablet Take one tablet one weekly    ibuprofen 400 mg, oral, Every 6 hours PRN    ketoconazole (NIZOral) 2 % shampoo 1 Application, Topical, Daily    levothyroxine (SYNTHROID, LEVOXYL) 88 mcg, oral, Daily    lisinopril 10 mg, oral, Daily    metoprolol succinate XL (TOPROL-XL) 50 mg, oral, Daily    metoprolol succinate XL (TOPROL-XL) 25 mg, oral, Daily, Swallow whole. Do not chew or crush    nitroglycerin (NITROSTAT) 0.3 mg, sublingual, Every 5 min PRN, UP TO 3 TIMES IF NO RELIEF CALL 911    pantoprazole (PROTONIX) 40 mg, oral, Daily    ProAir RespiClick 90 mcg/actuation aerosol powdr breath activated inhaler 1 puff, inhalation, Daily PRN    simvastatin (ZOCOR) 20 mg, oral, Nightly     PAST MEDICAL HISTORY:      Hypertension.  Hyperlipidemia.  Denies diabetes.  No prior known coronary artery disease.  No prior cerebrovascular or peripheral vascular disease.  No pulmonary history.  Prior hernia repair.  No other significant past medical or surgical history.    SOCIAL HISTORY:      .  [employer] unemployed.  40-pack-year smoking history.  Currently smokes half pack of cigarettes per day.  Occasional alcohol use.    FAMILY HISTORY:      Positive peripheral vascular disease in brother and sister with coronary stents.    ELECTROCARDIOGRAM:      Sinus rhythm, anterior and anterior MIs suggested.  Rate  83.    CARDIAC TESTING:      None    LABORATORY DATA:      10/2023:    Chem-7, CBC normal except for glucose 175.  Troponin negative.  TSH elevated at 14.  Negative COVID 9/2023.      All above testing was personally reviewed.      PROBLEM LIST:     Patient Active Problem List   Diagnosis    Thyroid disease    Hypertension    Hyperlipidemia    Heart disease             Natanael Wallace MD, FACC   [facility] /  Cardiology      Of Note:  [employer] voice recognition dictation software was utilized partially in the preparation of this note, therefore, inaccuracies in spelling, word choice and punctuation may have occurred which were not recognized the time of signing.    ----

## 2023-10-26 ENCOUNTER — HOSPITAL ENCOUNTER (OUTPATIENT)
Facility: HOSPITAL | Age: 58
Setting detail: OUTPATIENT SURGERY
End: 2023-10-26
Attending: INTERNAL MEDICINE | Admitting: INTERNAL MEDICINE
Payer: COMMERCIAL

## 2023-10-26 DIAGNOSIS — E03.9 HYPOTHYROIDISM, UNSPECIFIED TYPE: ICD-10-CM

## 2023-10-26 DIAGNOSIS — Z82.49 FAMILY HISTORY OF ISCHEMIC HEART DISEASE: ICD-10-CM

## 2023-10-26 DIAGNOSIS — I10 HYPERTENSION, UNSPECIFIED TYPE: ICD-10-CM

## 2023-10-26 DIAGNOSIS — M12.812 ROTATOR CUFF ARTHROPATHY, LEFT: ICD-10-CM

## 2023-10-26 DIAGNOSIS — G47.33 OSA (OBSTRUCTIVE SLEEP APNEA): ICD-10-CM

## 2023-10-26 DIAGNOSIS — R06.09 DOE (DYSPNEA ON EXERTION): ICD-10-CM

## 2023-10-26 DIAGNOSIS — R73.9 HYPERGLYCEMIA: ICD-10-CM

## 2023-10-26 DIAGNOSIS — R06.83 SNORES: ICD-10-CM

## 2023-10-26 DIAGNOSIS — M79.602 PAIN OF LEFT UPPER EXTREMITY: ICD-10-CM

## 2023-10-26 DIAGNOSIS — R07.9 CHEST PAIN, UNSPECIFIED TYPE: ICD-10-CM

## 2023-10-26 DIAGNOSIS — M54.10 RADICULAR PAIN: ICD-10-CM

## 2023-10-26 DIAGNOSIS — R09.89 BILATERAL CAROTID BRUITS: ICD-10-CM

## 2023-10-26 DIAGNOSIS — E78.2 MIXED HYPERLIPIDEMIA: ICD-10-CM

## 2023-10-26 DIAGNOSIS — R94.31 ABNORMAL EKG: ICD-10-CM

## 2023-10-26 DIAGNOSIS — M54.2 NECK PAIN: ICD-10-CM

## 2023-10-26 DIAGNOSIS — I25.2 HISTORY OF ACUTE ANTERIOR WALL MI: ICD-10-CM

## 2023-10-26 DIAGNOSIS — Z72.0 TOBACCO ABUSE: ICD-10-CM

## 2023-10-26 DIAGNOSIS — E07.9 THYROID DISEASE: ICD-10-CM

## 2023-10-26 DIAGNOSIS — R00.2 PALPITATIONS: ICD-10-CM

## 2023-10-29 ENCOUNTER — HOSPITAL ENCOUNTER (INPATIENT)
Age: 58
LOS: 1 days | Discharge: HOME | DRG: 287 | End: 2023-10-30
Attending: INTERNAL MEDICINE | Admitting: INTERNAL MEDICINE
Payer: COMMERCIAL

## 2023-10-29 ENCOUNTER — APPOINTMENT (OUTPATIENT)
Dept: GENERAL RADIOLOGY | Age: 58
DRG: 287 | End: 2023-10-29
Payer: COMMERCIAL

## 2023-10-29 DIAGNOSIS — R07.9 CHEST PAIN, UNSPECIFIED TYPE: Primary | ICD-10-CM

## 2023-10-29 DIAGNOSIS — I25.118 CORONARY ARTERY DISEASE OF NATIVE ARTERY OF NATIVE HEART WITH STABLE ANGINA PECTORIS (HCC): ICD-10-CM

## 2023-10-29 DIAGNOSIS — I20.89 ANGINA AT REST: ICD-10-CM

## 2023-10-29 LAB
ALBUMIN SERPL-MCNC: 3.7 G/DL (ref 3.5–4.6)
ALP SERPL-CCNC: 46 U/L (ref 35–104)
ALT SERPL-CCNC: 48 U/L (ref 0–41)
ANION GAP SERPL CALCULATED.3IONS-SCNC: 10 MEQ/L (ref 9–15)
AST SERPL-CCNC: 37 U/L (ref 0–40)
BASOPHILS # BLD: 0 K/UL (ref 0–0.2)
BASOPHILS NFR BLD: 0.5 %
BILIRUB SERPL-MCNC: <0.2 MG/DL (ref 0.2–0.7)
BNP BLD-MCNC: 53 PG/ML
BUN SERPL-MCNC: 13 MG/DL (ref 6–20)
CALCIUM SERPL-MCNC: 9 MG/DL (ref 8.5–9.9)
CHLORIDE SERPL-SCNC: 106 MEQ/L (ref 95–107)
CO2 SERPL-SCNC: 24 MEQ/L (ref 20–31)
CREAT SERPL-MCNC: 0.75 MG/DL (ref 0.7–1.2)
EOSINOPHIL # BLD: 0.7 K/UL (ref 0–0.7)
EOSINOPHIL NFR BLD: 9.1 %
ERYTHROCYTE [DISTWIDTH] IN BLOOD BY AUTOMATED COUNT: 13.9 % (ref 11.5–14.5)
GLOBULIN SER CALC-MCNC: 2.8 G/DL (ref 2.3–3.5)
GLUCOSE SERPL-MCNC: 169 MG/DL (ref 70–99)
HCT VFR BLD AUTO: 41.7 % (ref 42–52)
HGB BLD-MCNC: 13.5 G/DL (ref 14–18)
LYMPHOCYTES # BLD: 2.2 K/UL (ref 1–4.8)
LYMPHOCYTES NFR BLD: 27.3 %
MAGNESIUM SERPL-MCNC: 1.9 MG/DL (ref 1.7–2.4)
MCH RBC QN AUTO: 30.1 PG (ref 27–31.3)
MCHC RBC AUTO-ENTMCNC: 32.4 % (ref 33–37)
MCV RBC AUTO: 93.1 FL (ref 79–92.2)
MONOCYTES # BLD: 0.8 K/UL (ref 0.2–0.8)
MONOCYTES NFR BLD: 10.2 %
NEUTROPHILS # BLD: 4.2 K/UL (ref 1.4–6.5)
NEUTS SEG NFR BLD: 52.3 %
PLATELET # BLD AUTO: 261 K/UL (ref 130–400)
POTASSIUM SERPL-SCNC: 4.5 MEQ/L (ref 3.4–4.9)
PROT SERPL-MCNC: 6.5 G/DL (ref 6.3–8)
RBC # BLD AUTO: 4.48 M/UL (ref 4.7–6.1)
SODIUM SERPL-SCNC: 140 MEQ/L (ref 135–144)
TROPONIN, HIGH SENSITIVITY: <6 NG/L (ref 0–19)
TROPONIN, HIGH SENSITIVITY: <6 NG/L (ref 0–19)
WBC # BLD AUTO: 8 K/UL (ref 4.8–10.8)

## 2023-10-29 PROCEDURE — 99285 EMERGENCY DEPT VISIT HI MDM: CPT

## 2023-10-29 PROCEDURE — 93005 ELECTROCARDIOGRAM TRACING: CPT

## 2023-10-29 PROCEDURE — 6370000000 HC RX 637 (ALT 250 FOR IP)

## 2023-10-29 PROCEDURE — 36415 COLL VENOUS BLD VENIPUNCTURE: CPT

## 2023-10-29 PROCEDURE — 83735 ASSAY OF MAGNESIUM: CPT

## 2023-10-29 PROCEDURE — 80053 COMPREHEN METABOLIC PANEL: CPT

## 2023-10-29 PROCEDURE — 83880 ASSAY OF NATRIURETIC PEPTIDE: CPT

## 2023-10-29 PROCEDURE — 2060000000 HC ICU INTERMEDIATE R&B

## 2023-10-29 PROCEDURE — 6360000002 HC RX W HCPCS

## 2023-10-29 PROCEDURE — 71045 X-RAY EXAM CHEST 1 VIEW: CPT

## 2023-10-29 PROCEDURE — 85025 COMPLETE CBC W/AUTO DIFF WBC: CPT

## 2023-10-29 PROCEDURE — 84484 ASSAY OF TROPONIN QUANT: CPT

## 2023-10-29 PROCEDURE — 96374 THER/PROPH/DIAG INJ IV PUSH: CPT

## 2023-10-29 RX ORDER — ACETAMINOPHEN 325 MG/1
650 TABLET ORAL EVERY 4 HOURS PRN
Status: DISCONTINUED | OUTPATIENT
Start: 2023-10-29 | End: 2023-10-30 | Stop reason: HOSPADM

## 2023-10-29 RX ORDER — ONDANSETRON 4 MG/1
4 TABLET, ORALLY DISINTEGRATING ORAL EVERY 8 HOURS PRN
Status: DISCONTINUED | OUTPATIENT
Start: 2023-10-29 | End: 2023-10-30 | Stop reason: HOSPADM

## 2023-10-29 RX ORDER — MORPHINE SULFATE 2 MG/ML
2 INJECTION, SOLUTION INTRAMUSCULAR; INTRAVENOUS
Status: DISCONTINUED | OUTPATIENT
Start: 2023-10-29 | End: 2023-10-30 | Stop reason: HOSPADM

## 2023-10-29 RX ORDER — SODIUM CHLORIDE 0.9 % (FLUSH) 0.9 %
5-40 SYRINGE (ML) INJECTION EVERY 12 HOURS SCHEDULED
Status: DISCONTINUED | OUTPATIENT
Start: 2023-10-29 | End: 2023-10-30 | Stop reason: HOSPADM

## 2023-10-29 RX ORDER — SODIUM CHLORIDE 9 MG/ML
INJECTION, SOLUTION INTRAVENOUS PRN
Status: DISCONTINUED | OUTPATIENT
Start: 2023-10-29 | End: 2023-10-30 | Stop reason: SDUPTHER

## 2023-10-29 RX ORDER — ONDANSETRON 2 MG/ML
4 INJECTION INTRAMUSCULAR; INTRAVENOUS EVERY 6 HOURS PRN
Status: DISCONTINUED | OUTPATIENT
Start: 2023-10-29 | End: 2023-10-30 | Stop reason: HOSPADM

## 2023-10-29 RX ORDER — ASPIRIN 81 MG/1
324 TABLET, CHEWABLE ORAL ONCE
Status: COMPLETED | OUTPATIENT
Start: 2023-10-29 | End: 2023-10-29

## 2023-10-29 RX ORDER — MORPHINE SULFATE 4 MG/ML
4 INJECTION, SOLUTION INTRAMUSCULAR; INTRAVENOUS ONCE
Status: COMPLETED | OUTPATIENT
Start: 2023-10-29 | End: 2023-10-29

## 2023-10-29 RX ORDER — MORPHINE SULFATE 4 MG/ML
4 INJECTION, SOLUTION INTRAMUSCULAR; INTRAVENOUS
Status: DISCONTINUED | OUTPATIENT
Start: 2023-10-29 | End: 2023-10-30 | Stop reason: HOSPADM

## 2023-10-29 RX ORDER — ENOXAPARIN SODIUM 100 MG/ML
1 INJECTION SUBCUTANEOUS ONCE
Status: COMPLETED | OUTPATIENT
Start: 2023-10-29 | End: 2023-10-30

## 2023-10-29 RX ORDER — NITROGLYCERIN 0.4 MG/1
0.4 TABLET SUBLINGUAL EVERY 5 MIN PRN
Status: DISCONTINUED | OUTPATIENT
Start: 2023-10-29 | End: 2023-10-30 | Stop reason: SDUPTHER

## 2023-10-29 RX ORDER — SODIUM CHLORIDE 0.9 % (FLUSH) 0.9 %
5-40 SYRINGE (ML) INJECTION PRN
Status: DISCONTINUED | OUTPATIENT
Start: 2023-10-29 | End: 2023-10-30 | Stop reason: HOSPADM

## 2023-10-29 RX ADMIN — MORPHINE SULFATE 4 MG: 4 INJECTION, SOLUTION INTRAMUSCULAR; INTRAVENOUS at 20:39

## 2023-10-29 RX ADMIN — ASPIRIN 324 MG: 81 TABLET, CHEWABLE ORAL at 20:38

## 2023-10-29 ASSESSMENT — PATIENT HEALTH QUESTIONNAIRE - PHQ9
SUM OF ALL RESPONSES TO PHQ QUESTIONS 1-9: 0
SUM OF ALL RESPONSES TO PHQ QUESTIONS 1-9: 0
SUM OF ALL RESPONSES TO PHQ9 QUESTIONS 1 & 2: 0
SUM OF ALL RESPONSES TO PHQ QUESTIONS 1-9: 0
1. LITTLE INTEREST OR PLEASURE IN DOING THINGS: 0
SUM OF ALL RESPONSES TO PHQ QUESTIONS 1-9: 0
2. FEELING DOWN, DEPRESSED OR HOPELESS: 0

## 2023-10-29 ASSESSMENT — PAIN DESCRIPTION - DESCRIPTORS: DESCRIPTORS: SHOOTING;BURNING

## 2023-10-29 ASSESSMENT — ENCOUNTER SYMPTOMS
VOMITING: 0
COUGH: 0
DIARRHEA: 0
ABDOMINAL PAIN: 0
NAUSEA: 0
SHORTNESS OF BREATH: 0
PHOTOPHOBIA: 0

## 2023-10-29 ASSESSMENT — PAIN DESCRIPTION - PAIN TYPE: TYPE: ACUTE PAIN

## 2023-10-29 ASSESSMENT — PAIN SCALES - GENERAL: PAINLEVEL_OUTOF10: 6

## 2023-10-29 ASSESSMENT — PAIN DESCRIPTION - FREQUENCY: FREQUENCY: INTERMITTENT

## 2023-10-29 ASSESSMENT — PAIN DESCRIPTION - LOCATION: LOCATION: CHEST

## 2023-10-29 ASSESSMENT — PAIN - FUNCTIONAL ASSESSMENT: PAIN_FUNCTIONAL_ASSESSMENT: 0-10

## 2023-10-29 ASSESSMENT — HEART SCORE: ECG: 0

## 2023-10-29 ASSESSMENT — LIFESTYLE VARIABLES: HOW OFTEN DO YOU HAVE A DRINK CONTAINING ALCOHOL: NEVER

## 2023-10-30 VITALS
BODY MASS INDEX: 29.82 KG/M2 | DIASTOLIC BLOOD PRESSURE: 90 MMHG | WEIGHT: 225 LBS | TEMPERATURE: 97.9 F | HEART RATE: 77 BPM | SYSTOLIC BLOOD PRESSURE: 147 MMHG | RESPIRATION RATE: 16 BRPM | OXYGEN SATURATION: 100 % | HEIGHT: 73 IN

## 2023-10-30 PROBLEM — I20.89 ANGINA AT REST: Status: ACTIVE | Noted: 2023-10-29

## 2023-10-30 LAB
ANION GAP SERPL CALCULATED.3IONS-SCNC: 7 MEQ/L (ref 9–15)
BUN SERPL-MCNC: 12 MG/DL (ref 6–20)
CALCIUM SERPL-MCNC: 8.8 MG/DL (ref 8.5–9.9)
CHLORIDE SERPL-SCNC: 105 MEQ/L (ref 95–107)
CO2 SERPL-SCNC: 26 MEQ/L (ref 20–31)
CREAT SERPL-MCNC: 0.91 MG/DL (ref 0.7–1.2)
EKG ATRIAL RATE: 91 BPM
EKG P AXIS: 43 DEGREES
EKG P-R INTERVAL: 150 MS
EKG Q-T INTERVAL: 342 MS
EKG QRS DURATION: 90 MS
EKG QTC CALCULATION (BAZETT): 420 MS
EKG R AXIS: -18 DEGREES
EKG T AXIS: 30 DEGREES
EKG VENTRICULAR RATE: 91 BPM
ERYTHROCYTE [DISTWIDTH] IN BLOOD BY AUTOMATED COUNT: 13.9 % (ref 11.5–14.5)
GLUCOSE SERPL-MCNC: 113 MG/DL (ref 70–99)
HCT VFR BLD AUTO: 44 % (ref 42–52)
HGB BLD-MCNC: 14.2 G/DL (ref 14–18)
MCH RBC QN AUTO: 30 PG (ref 27–31.3)
MCHC RBC AUTO-ENTMCNC: 32.3 % (ref 33–37)
MCV RBC AUTO: 93 FL (ref 79–92.2)
PLATELET # BLD AUTO: 290 K/UL (ref 130–400)
POTASSIUM SERPL-SCNC: 4.2 MEQ/L (ref 3.4–4.9)
RBC # BLD AUTO: 4.73 M/UL (ref 4.7–6.1)
SODIUM SERPL-SCNC: 138 MEQ/L (ref 135–144)
TROPONIN, HIGH SENSITIVITY: 8 NG/L (ref 0–19)
TROPONIN, HIGH SENSITIVITY: 9 NG/L (ref 0–19)
TROPONIN, HIGH SENSITIVITY: <6 NG/L (ref 0–19)
WBC # BLD AUTO: 7.2 K/UL (ref 4.8–10.8)

## 2023-10-30 PROCEDURE — B2151ZZ FLUOROSCOPY OF LEFT HEART USING LOW OSMOLAR CONTRAST: ICD-10-PCS | Performed by: INTERNAL MEDICINE

## 2023-10-30 PROCEDURE — 36415 COLL VENOUS BLD VENIPUNCTURE: CPT

## 2023-10-30 PROCEDURE — B2111ZZ FLUOROSCOPY OF MULTIPLE CORONARY ARTERIES USING LOW OSMOLAR CONTRAST: ICD-10-PCS | Performed by: INTERNAL MEDICINE

## 2023-10-30 PROCEDURE — 6370000000 HC RX 637 (ALT 250 FOR IP): Performed by: INTERNAL MEDICINE

## 2023-10-30 PROCEDURE — 4A023N7 MEASUREMENT OF CARDIAC SAMPLING AND PRESSURE, LEFT HEART, PERCUTANEOUS APPROACH: ICD-10-PCS | Performed by: INTERNAL MEDICINE

## 2023-10-30 PROCEDURE — 7100000011 HC PHASE II RECOVERY - ADDTL 15 MIN: Performed by: INTERNAL MEDICINE

## 2023-10-30 PROCEDURE — 99152 MOD SED SAME PHYS/QHP 5/>YRS: CPT | Performed by: INTERNAL MEDICINE

## 2023-10-30 PROCEDURE — C1894 INTRO/SHEATH, NON-LASER: HCPCS | Performed by: INTERNAL MEDICINE

## 2023-10-30 PROCEDURE — 93452 LEFT HRT CATH W/VENTRCLGRPHY: CPT | Performed by: INTERNAL MEDICINE

## 2023-10-30 PROCEDURE — 2500000003 HC RX 250 WO HCPCS: Performed by: INTERNAL MEDICINE

## 2023-10-30 PROCEDURE — 2709999900 HC NON-CHARGEABLE SUPPLY: Performed by: INTERNAL MEDICINE

## 2023-10-30 PROCEDURE — C1769 GUIDE WIRE: HCPCS | Performed by: INTERNAL MEDICINE

## 2023-10-30 PROCEDURE — 6360000004 HC RX CONTRAST MEDICATION: Performed by: INTERNAL MEDICINE

## 2023-10-30 PROCEDURE — 80048 BASIC METABOLIC PNL TOTAL CA: CPT

## 2023-10-30 PROCEDURE — 93458 L HRT ARTERY/VENTRICLE ANGIO: CPT | Performed by: INTERNAL MEDICINE

## 2023-10-30 PROCEDURE — 6360000002 HC RX W HCPCS: Performed by: INTERNAL MEDICINE

## 2023-10-30 PROCEDURE — 84484 ASSAY OF TROPONIN QUANT: CPT

## 2023-10-30 PROCEDURE — 7100000010 HC PHASE II RECOVERY - FIRST 15 MIN: Performed by: INTERNAL MEDICINE

## 2023-10-30 PROCEDURE — 85027 COMPLETE CBC AUTOMATED: CPT

## 2023-10-30 PROCEDURE — 6360000002 HC RX W HCPCS

## 2023-10-30 RX ORDER — SODIUM CHLORIDE 9 MG/ML
INJECTION, SOLUTION INTRAVENOUS CONTINUOUS
Status: DISCONTINUED | OUTPATIENT
Start: 2023-10-30 | End: 2023-10-30 | Stop reason: HOSPADM

## 2023-10-30 RX ORDER — NICOTINE 21 MG/24HR
1 PATCH, TRANSDERMAL 24 HOURS TRANSDERMAL DAILY
Status: DISCONTINUED | OUTPATIENT
Start: 2023-10-30 | End: 2023-10-30 | Stop reason: HOSPADM

## 2023-10-30 RX ORDER — ONDANSETRON 2 MG/ML
4 INJECTION INTRAMUSCULAR; INTRAVENOUS EVERY 6 HOURS PRN
Status: DISCONTINUED | OUTPATIENT
Start: 2023-10-30 | End: 2023-10-30 | Stop reason: SDUPTHER

## 2023-10-30 RX ORDER — LISINOPRIL 20 MG/1
20 TABLET ORAL DAILY
COMMUNITY

## 2023-10-30 RX ORDER — ONDANSETRON 2 MG/ML
4 INJECTION INTRAMUSCULAR; INTRAVENOUS EVERY 6 HOURS PRN
Status: DISCONTINUED | OUTPATIENT
Start: 2023-10-30 | End: 2023-10-30 | Stop reason: HOSPADM

## 2023-10-30 RX ORDER — SODIUM CHLORIDE 9 MG/ML
INJECTION, SOLUTION INTRAVENOUS PRN
Status: DISCONTINUED | OUTPATIENT
Start: 2023-10-30 | End: 2023-10-30 | Stop reason: HOSPADM

## 2023-10-30 RX ORDER — NITROGLYCERIN 0.4 MG/1
0.4 TABLET SUBLINGUAL EVERY 5 MIN PRN
Qty: 25 TABLET | Refills: 3 | Status: SHIPPED | OUTPATIENT
Start: 2023-10-30

## 2023-10-30 RX ORDER — MIDAZOLAM HYDROCHLORIDE 2 MG/2ML
2 INJECTION, SOLUTION INTRAMUSCULAR; INTRAVENOUS
Status: DISCONTINUED | OUTPATIENT
Start: 2023-10-30 | End: 2023-10-30 | Stop reason: HOSPADM

## 2023-10-30 RX ORDER — MORPHINE SULFATE 2 MG/ML
2 INJECTION, SOLUTION INTRAMUSCULAR; INTRAVENOUS
Status: DISCONTINUED | OUTPATIENT
Start: 2023-10-30 | End: 2023-10-30 | Stop reason: HOSPADM

## 2023-10-30 RX ORDER — ISOSORBIDE MONONITRATE 30 MG/1
30 TABLET, EXTENDED RELEASE ORAL DAILY
COMMUNITY

## 2023-10-30 RX ORDER — LABETALOL HYDROCHLORIDE 5 MG/ML
10 INJECTION, SOLUTION INTRAVENOUS EVERY 30 MIN PRN
Status: DISCONTINUED | OUTPATIENT
Start: 2023-10-30 | End: 2023-10-30 | Stop reason: HOSPADM

## 2023-10-30 RX ORDER — ASPIRIN 81 MG/1
81 TABLET ORAL ONCE
Status: DISCONTINUED | OUTPATIENT
Start: 2023-10-30 | End: 2023-10-30 | Stop reason: HOSPADM

## 2023-10-30 RX ORDER — ACETAMINOPHEN 325 MG/1
650 TABLET ORAL EVERY 4 HOURS PRN
Status: DISCONTINUED | OUTPATIENT
Start: 2023-10-30 | End: 2023-10-30 | Stop reason: HOSPADM

## 2023-10-30 RX ORDER — DIPHENHYDRAMINE HCL 25 MG
50 TABLET ORAL
Status: DISCONTINUED | OUTPATIENT
Start: 2023-10-30 | End: 2023-10-30 | Stop reason: HOSPADM

## 2023-10-30 RX ORDER — NICARDIPINE HYDROCHLORIDE 2.5 MG/ML
INJECTION INTRAVENOUS PRN
Status: DISCONTINUED | OUTPATIENT
Start: 2023-10-30 | End: 2023-10-30 | Stop reason: HOSPADM

## 2023-10-30 RX ORDER — LORAZEPAM 0.5 MG/1
0.5 TABLET ORAL EVERY 4 HOURS PRN
Status: DISCONTINUED | OUTPATIENT
Start: 2023-10-30 | End: 2023-10-30

## 2023-10-30 RX ORDER — LIDOCAINE HYDROCHLORIDE 10 MG/ML
INJECTION, SOLUTION INFILTRATION; PERINEURAL PRN
Status: DISCONTINUED | OUTPATIENT
Start: 2023-10-30 | End: 2023-10-30 | Stop reason: HOSPADM

## 2023-10-30 RX ORDER — RANOLAZINE 500 MG/1
500 TABLET, EXTENDED RELEASE ORAL 2 TIMES DAILY
Qty: 60 TABLET | Refills: 3 | Status: SHIPPED | OUTPATIENT
Start: 2023-10-30

## 2023-10-30 RX ORDER — SODIUM CHLORIDE 0.9 % (FLUSH) 0.9 %
5-40 SYRINGE (ML) INJECTION EVERY 12 HOURS SCHEDULED
Status: DISCONTINUED | OUTPATIENT
Start: 2023-10-30 | End: 2023-10-30 | Stop reason: HOSPADM

## 2023-10-30 RX ORDER — PREDNISONE 50 MG/1
50 TABLET ORAL ONCE AS NEEDED
Status: DISCONTINUED | OUTPATIENT
Start: 2023-10-30 | End: 2023-10-30 | Stop reason: HOSPADM

## 2023-10-30 RX ORDER — ASPIRIN 81 MG/1
81 TABLET ORAL DAILY
COMMUNITY

## 2023-10-30 RX ORDER — SODIUM CHLORIDE 0.9 % (FLUSH) 0.9 %
5-40 SYRINGE (ML) INJECTION PRN
Status: DISCONTINUED | OUTPATIENT
Start: 2023-10-30 | End: 2023-10-30 | Stop reason: HOSPADM

## 2023-10-30 RX ORDER — LORAZEPAM 1 MG/1
1 TABLET ORAL EVERY 4 HOURS PRN
Status: DISCONTINUED | OUTPATIENT
Start: 2023-10-30 | End: 2023-10-30 | Stop reason: HOSPADM

## 2023-10-30 RX ORDER — NITROGLYCERIN 20 MG/100ML
INJECTION INTRAVENOUS CONTINUOUS PRN
Status: COMPLETED | OUTPATIENT
Start: 2023-10-30 | End: 2023-10-30

## 2023-10-30 RX ORDER — NITROGLYCERIN 0.4 MG/1
0.4 TABLET SUBLINGUAL EVERY 5 MIN PRN
Status: DISCONTINUED | OUTPATIENT
Start: 2023-10-30 | End: 2023-10-30 | Stop reason: HOSPADM

## 2023-10-30 RX ORDER — MIDAZOLAM HYDROCHLORIDE 1 MG/ML
INJECTION INTRAMUSCULAR; INTRAVENOUS PRN
Status: DISCONTINUED | OUTPATIENT
Start: 2023-10-30 | End: 2023-10-30 | Stop reason: HOSPADM

## 2023-10-30 RX ORDER — HEPARIN SODIUM 1000 [USP'U]/ML
INJECTION, SOLUTION INTRAVENOUS; SUBCUTANEOUS PRN
Status: DISCONTINUED | OUTPATIENT
Start: 2023-10-30 | End: 2023-10-30 | Stop reason: HOSPADM

## 2023-10-30 RX ORDER — FENTANYL CITRATE 0.05 MG/ML
25 INJECTION, SOLUTION INTRAMUSCULAR; INTRAVENOUS
Status: DISCONTINUED | OUTPATIENT
Start: 2023-10-30 | End: 2023-10-30 | Stop reason: HOSPADM

## 2023-10-30 RX ORDER — LEVOTHYROXINE SODIUM 88 UG/1
88 TABLET ORAL DAILY
COMMUNITY

## 2023-10-30 RX ADMIN — ENOXAPARIN SODIUM 100 MG: 100 INJECTION SUBCUTANEOUS at 00:03

## 2023-10-30 RX ADMIN — LORAZEPAM 1 MG: 1 TABLET ORAL at 13:09

## 2023-10-30 RX ADMIN — MORPHINE SULFATE 2 MG: 2 INJECTION, SOLUTION INTRAMUSCULAR; INTRAVENOUS at 00:53

## 2023-10-30 ASSESSMENT — ENCOUNTER SYMPTOMS
ALLERGIC/IMMUNOLOGIC NEGATIVE: 1
SHORTNESS OF BREATH: 1
EYES NEGATIVE: 1
GASTROINTESTINAL NEGATIVE: 1
ABDOMINAL PAIN: 0
NAUSEA: 0
WHEEZING: 0
VOMITING: 0

## 2023-10-30 ASSESSMENT — PAIN SCALES - GENERAL: PAINLEVEL_OUTOF10: 4

## 2023-10-30 NOTE — ED PROVIDER NOTES
[facility] ED  EMERGENCY DEPARTMENT ENCOUNTER      Pt Name: Pradeep Bryant  MRN: [id number]  [address] [facility] [place] 1965  Date of evaluation: 10/29/2023  Provider: CRISTIAN Hui  8:22 PM EDT      CHIEF COMPLAINT       Chief Complaint   Patient presents with    Chest Pain     Cp all day, no relief from nitro         HISTORY OF PRESENT ILLNESS   (Location/Symptom, Timing/Onset, Context/Setting, Quality, Duration, Modifying Factors, Severity)  Note limiting factors. Pradeep Bryant is a 62 y.o. male who presents to the emergency department PMHx anterior wall MI, hyperlipidemia, hypertension, hypothyroidism, LISA, palpitations, tobacco use. Patient presents today for evaluation of chest pain. States substernal, states initially started around 530 this morning about 4 hours ago, has been intermittent. Patient states he has taken a total of 3 sublingual nitro today, most recently about half an hour ago, states it was initially helping but not so much anymore. Patient states he also feels a pain to his medial left elbow, he does not feel it is radiating from his chest but feels it is a separate pain. Patient denies radiation of his chest pain anywhere. Denies associated shortness of breath, nausea, vomiting, diaphoresis, lightheadedness. Patient follows with Dr. Mercy Reyes of cardiology. Patient has been told he has had an MI in the past.  Patient states he has been having chest pain episodes recently and just saw Dr. Mercy Reyes, they have a left heart cath scheduled for him next week for further evaluation. Patient states today his chest pain feels similar to the episodes he has been having, but more persistent and severe today. HPI    Nursing Notes were reviewed. REVIEW OF SYSTEMS    (2-9 systems for level 4, 10 or more for level 5)     Review of Systems   Constitutional:  Negative for chills and fever. HENT:  Negative for congestion. Eyes:  Negative for photophobia.    Respiratory:  Negative for

## 2023-10-30 NOTE — PLAN OF CARE
Problem: Discharge Planning  Goal: Discharge to home or other facility with appropriate resources  10/30/2023 1727 by Naila Piper RN  Outcome: Adequate for Discharge  10/30/2023 1346 by Naila Piper RN  Outcome: Progressing  Flowsheets (Taken 10/30/2023 9679)  Discharge to home or other facility with appropriate resources: Identify barriers to discharge with patient and caregiver     Problem: Pain  Goal: Verbalizes/displays adequate comfort level or baseline comfort level  10/30/2023 1727 by Naila Piper RN  Outcome: Adequate for Discharge  10/30/2023 1346 by Naila Piper RN  Outcome: Progressing     Problem: Safety - Adult  Goal: Free from fall injury  10/30/2023 1727 by Naila Piper RN  Outcome: Adequate for Discharge  10/30/2023 1346 by Naila Piper RN  Outcome: Progressing  Flowsheets (Taken 10/30/2023 1343)  Free From Fall Injury: Instruct family/caregiver on patient safety

## 2023-10-30 NOTE — PROGRESS NOTES
Pt returned to pre/post after procedure. Bedside patient report given by Ravi Rico. Pt connected to bedside monitor and linked. Pt resting at this time. Rt wrist has a radial band present at this time. No bleeding or hematoma present currently.

## 2023-10-30 NOTE — DISCHARGE INSTR - DIET
Good nutrition is important when healing from an illness, injury, or surgery. Follow any nutrition recommendations given to you during your hospital stay. If you were given an oral nutrition supplement while in the hospital, continue to take this supplement at home. You can take it with meals, in-between meals, and/or before bedtime. These supplements can be purchased at most local grocery stores, pharmacies, and chain [employer]-stores. If you have any questions about your diet or nutrition, call the hospital and ask for the dietitian.     Heart healthy, low sodium diet

## 2023-10-30 NOTE — ED TRIAGE NOTES
Pt c/o burning mid sternal cp radiating down left arm all day. Pt has taken ntg 4 times without relief. Pt is a/o x 4 anxious. Pt taken straight back to Er 9 for ekg and to finish triage.  Pt placed on bedside monitor

## 2023-10-30 NOTE — PROGRESS NOTES
Send cath films to [facility] electronically, provided patient with [facility] cardiology phone numbers

## 2023-10-30 NOTE — PROGRESS NOTES
Pt arrived to [address] [place] [address]  A&O. Patient a little anxious says he is just ready to have what he needs to have done, done. 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 Pt vitals stable Afebrile HR 83 Resp 18 /83. Pt C/O some discomfort 4/10 given PRN morphine. 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 Pt oriented to room and call light. Bed in lowest position call light in reach. Will continue to monitor pt through out the night.

## 2023-10-30 NOTE — FLOWSHEET NOTE
Iv and tele removed for discharge. Care plan completed for discharge. Appropriate education addressed in discharge instructions. Instructions completed and reviewed with patient. Verbalize understanding of instructions and follow up. Personal belongings gathered. No complaints. Family member present.         Electronically signed by Kumar Gamez RN on 10/30/2023 at 6:07 PM

## 2023-10-30 NOTE — FLOWSHEET NOTE
1234- Patient requested to leave AMA. Apropriate education and encouragement to stay and see the doctor provided to patient and his wife. AMA form completed and signed by patient and then RN. IV and tele removed. Patient encouraged to follow up with [facility] for cardiac cath next week. 12- Dr. Donis Vicente by for rounds and passed patient as he was leaving. Dr. Donis Vicente talked with patient and this RN regarding cardiac plan. He advised patient to have cardiac cath and arranged for that to take place today if patient was willing to stay. Patient agreed to have procedure. As patient was taken back to his room, he was provided with a clean gown, CHG solution and electric razor. Patient instructed to wash and shave groin. 1310- Cath lab nurse present for transport. Patient prepped and ready. No IV access. Patient medicated with 1mg ativan and nicoderm patch prior to transport to cath lab.      Electronically signed by Morelia Singh RN on 10/30/2023 at 1:15 PM

## 2023-10-30 NOTE — PLAN OF CARE
Problem: Discharge Planning  Goal: Discharge to home or other facility with appropriate resources  Outcome: Progressing  Flowsheets (Taken 10/30/2023 0928)  Discharge to home or other facility with appropriate resources: Identify barriers to discharge with patient and caregiver     Problem: Pain  Goal: Verbalizes/displays adequate comfort level or baseline comfort level  Outcome: Progressing     Problem: Safety - Adult  Goal: Free from fall injury  Outcome: Progressing  Flowsheets (Taken 10/30/2023 1343)  Free From Fall Injury: Instruct family/caregiver on patient safety

## 2023-10-30 NOTE — BRIEF OP NOTE
Section of Cardiology  Adult Brief Cardiac Cath Procedure Note        Procedure(s):  LHC, b/l coronary angio    Pre-operative Diagnosis:  angina class IV    H&P Status: Completed and reviewed. Post-operative Diagnosis:      LV EF of 60%  LM short, 50%. Smaller than CX and LAD  LAD tortuous. Large caliber. Diffuse disease, 90% in mid and distal  CX large caliber, tortuous. 95-99% mid stenosis  RCA large caliber. Tortuous. Mod diffuse disease. No focal stenosis. Findings:  See full report    Complications:  none    Primary Proceduralist:   Dr.Wes Hobson DO    Plan      RFM  Max med rx  Refer for CABG evaluation.      Full procedure note to follow

## 2023-10-30 NOTE — ED NOTES
Pt states \"I cannot stay here unless I can smoke a cigarette\". Pt informed he is unable to smoke at this time. IV removed as pt states he wants to leave at this time.       Mayela Kelly  10/29/23 3486

## 2023-10-30 NOTE — PROCEDURES
Arrived to pre/post cath from [address] [place] [address], alert and oriented. Vital signs obtained. Consent for procedure signed. Prepping pt for procedure.

## 2023-10-30 NOTE — CARE COORDINATION
Case Management Assessment  Initial Evaluation    Date/Time of Evaluation: 10/30/2023 11:49 AM  Assessment Completed by: Latisha Horan RN    If patient is discharged prior to next notation, then this note serves as note for discharge by case management. Patient Name: Margarito Lizarraga                   YOB: 1965  Diagnosis: Chest pain [R07.9]  Chest pain, unspecified type [R07.9]                   Date / Time: 10/29/2023  8:16 PM    Patient Admission Status: Inpatient   Readmission Risk (Low < 19, Mod (19-27), High > 27): Readmission Risk Score: 10.1    Current PCP: CRISTIAN Jhaveri  PCP verified by CM? Yes    Chart Reviewed: Yes      History Provided by: Patient  Patient Orientation: Alert and Oriented, Person, Place, Situation, Self    Patient Cognition: Alert    Hospitalization in the last 30 days (Readmission):  No    If yes, Readmission Assessment in CM Navigator will be completed. Advance Directives:      Code Status: Full Code   Patient's Primary Decision Maker is: Legal Next of Kin      Discharge Planning:    Patient lives with: Spouse/Significant Other Type of Home: House  Primary Care Giver: Self  Patient Support Systems include: Spouse/Significant Other, Children   Current Financial resources:    Current community resources:    Current services prior to admission: None            Current DME:              Type of Home Care services:  None    ADLS  Prior functional level: Independent in ADLs/IADLs  Current functional level: Independent in ADLs/IADLs    PT AM-PAC:   /24  OT AM-PAC:   /24    Family can provide assistance at DC: Yes  Would you like Case Management to discuss the discharge plan with any other family members/significant others, and if so, who?  No  Plans to Return to Present Housing: Yes  Other Identified Issues/Barriers to RETURNING to current housing: N/A  Potential Assistance needed at discharge: N/A            Potential DME:    Patient expects to discharge to: [address] [place] [address]

## 2023-10-30 NOTE — H&P
DATE OF CONSULTATION  10/30/2023    CONSULTANT  Oswaldo Sanchez DO     REQUESTING PHYSICIAN  Oswaldo Hobson DO     PRIMARY CARDIOLOGIST  [place]    REASON FOR CONSULTATION  Chief Complaint   Patient presents with    Chest Pain     Cp all day, no relief from nitro       Hospital Day: 1       Patient is a 62 y.o. male who presents with a chief complaint of CP. Patient is followed on a regular basis by Dr. Son Easton, [address] [place] [address]. Patient with past medical history of hypertension, hyperlipidemia, substance abuse who presents with typical anginal symptoms. Patient scheduled to undergo cardiac catheterization as outpatient in 1 week. Initial cardiac enzyme is negative. No prior history of cardiac catheterization    Past Medical History:   Diagnosis Date    Hyperlipidemia     Hypertension     Thyroid disease       Patient Active Problem List   Diagnosis    Chest pain    Angina at rest       Past Surgical History:   Procedure Laterality Date    HERNIA REPAIR         Social History     Socioeconomic History    Marital status:    Tobacco Use    Smoking status: Every Day     Packs/day: .5     Types: Cigarettes    Smokeless tobacco: Never   Substance and Sexual Activity    Alcohol use: Never    Drug use: Never       No family history on file.     Current Facility-Administered Medications   Medication Dose Route Frequency Provider Last Rate Last Admin    sodium chloride flush 0.9 % injection 5-40 mL  5-40 mL IntraVENous 2 times per day Oswaldo Hobson DO        sodium chloride flush 0.9 % injection 5-40 mL  5-40 mL IntraVENous PRN Oswaldo Hobson DO        0.9 % sodium chloride infusion   IntraVENous PRN Oswaldo Hobson DO        acetaminophen (TYLENOL) tablet 650 mg  650 mg Oral Q4H PRN HolOswaldo arcos DO        ondansetron (ZOFRAN-ODT) disintegrating tablet 4 mg  4 mg Oral Q8H PRN HolOswaldo arcos DO        Or    ondansetron (ZOFRAN) injection 4 mg  4 mg IntraVENous Q6H PRN HolidayOswaldo DO        morphine (PF)

## 2023-10-31 ENCOUNTER — PATIENT OUTREACH (OUTPATIENT)
Dept: PRIMARY CARE | Facility: CLINIC | Age: 58
End: 2023-10-31

## 2023-10-31 RX ORDER — RANOLAZINE 500 MG/1
1 TABLET, EXTENDED RELEASE ORAL 2 TIMES DAILY
COMMUNITY
Start: 2023-10-30 | End: 2023-11-27 | Stop reason: SDUPTHER

## 2023-10-31 RX ORDER — NITROGLYCERIN 0.4 MG/1
TABLET SUBLINGUAL
COMMUNITY
Start: 2023-10-30 | End: 2023-11-27 | Stop reason: SDUPTHER

## 2023-10-31 RX ORDER — METOPROLOL TARTRATE 25 MG/1
25 TABLET, FILM COATED ORAL 2 TIMES DAILY
COMMUNITY
Start: 2023-10-30 | End: 2023-11-27 | Stop reason: DRUGHIGH

## 2023-10-31 NOTE — PROGRESS NOTES
Discharge Facility: [facility]  Discharge Diagnosis: Chest pain, unspecified type (Primary Dx); Angina at rest; Coronary artery disease of native artery of native heart with stable angina pectoris   Admission Date: 10/29/2023  Discharge Date: 10/30/2023    PCP Appointment Date: 11/7/2023  Specialist Appointment Date: 11/16 cardiology  Hospital Encounter and Summary: Linked  See discharge assessment below for further details  Engagement  Call Start Time: 1444 (10/31/2023  2:55 PM)    Medications  Medications reviewed with patient/caregiver?: Yes (new meds discussed) (10/31/2023  2:55 PM)  Is the patient having any side effects they believe may be caused by any medication additions or changes?: No (10/31/2023  2:55 PM)  Does the patient have all medications ordered at discharge?: Yes (10/31/2023  2:55 PM)  Care Management Interventions: No intervention needed (10/31/2023  2:55 PM)  Prescription Comments: see med list (ranexa; nitro; metoprolol) (10/31/2023  2:55 PM)  Is the patient taking all medications as directed (includes completed medication regime)?: Yes (10/31/2023  2:55 PM)  Care Management Interventions: Provided patient education (10/31/2023  2:55 PM)    Appointments  Does the patient have a primary care provider?: Yes (10/31/2023  2:55 PM)  Care Management Interventions: Verified appointment date/time/provider (10/31/2023  2:55 PM)  Has the patient kept scheduled appointments due by today?: Yes (10/31/2023  2:55 PM)  Care Management Interventions: Advised patient to keep appointment (10/31/2023  2:55 PM)    Self Management  What is the home health agency?: denies need (10/31/2023  2:55 PM)    Patient Teaching  Does the patient have access to their discharge instructions?: Yes (10/31/2023  2:55 PM)  Care Management Interventions: Reviewed instructions with patient (10/31/2023  2:55 PM)  What is the patient's perception of their health status since discharge?: Improving (10/31/2023  2:55  PM)  Is the patient/caregiver able to teach back the hierarchy of who to call/visit for symptoms/problems? PCP, Specialist, Home Health nurse, Urgent Care, ED, 911: Yes (10/31/2023  2:55 PM)  Patient/Caregiver Education Comments: Patient is doing well. No c/p or SOB reported during outreach call. States they had the cath at the hospital and will need to follow up with Dr. Cheryl Murphy for a CABG. (10/31/2023  2:55 PM)

## 2023-11-02 ENCOUNTER — OFFICE VISIT (OUTPATIENT)
Dept: UROLOGY | Facility: CLINIC | Age: 58
End: 2023-11-02
Payer: COMMERCIAL

## 2023-11-02 VITALS
HEIGHT: 73 IN | WEIGHT: 226.19 LBS | RESPIRATION RATE: 16 BRPM | HEART RATE: 94 BPM | BODY MASS INDEX: 29.98 KG/M2 | SYSTOLIC BLOOD PRESSURE: 107 MMHG | DIASTOLIC BLOOD PRESSURE: 67 MMHG

## 2023-11-02 DIAGNOSIS — N48.6 PEYRONIE DISEASE: Primary | ICD-10-CM

## 2023-11-02 DIAGNOSIS — N52.01 ERECTILE DYSFUNCTION DUE TO ARTERIAL INSUFFICIENCY: ICD-10-CM

## 2023-11-02 PROBLEM — N52.9 ERECTILE DYSFUNCTION: Status: ACTIVE | Noted: 2023-11-02

## 2023-11-02 PROCEDURE — 3074F SYST BP LT 130 MM HG: CPT | Performed by: UROLOGY

## 2023-11-02 PROCEDURE — 3078F DIAST BP <80 MM HG: CPT | Performed by: UROLOGY

## 2023-11-02 PROCEDURE — 99214 OFFICE O/P EST MOD 30 MIN: CPT | Performed by: UROLOGY

## 2023-11-02 PROCEDURE — 99204 OFFICE O/P NEW MOD 45 MIN: CPT | Performed by: UROLOGY

## 2023-11-02 NOTE — PROGRESS NOTES
"History Of Present Illness  58-year-old male here to see me regarding erectile dysfunction  PMH: Hypertension, hyperlipidemia, dyspnea on exertion, hypothyroidism, ANA  On daily nitrates  On metoprolol    Pt reports he has a knot on the back of his penis. Reports right sided curvature. Getting erections, insufficient for penetration.   No pain with erections.  First noticed 1 yr ago.  Curvature hasn't progressed.     Past Medical History  He has no past medical history on file.    Surgical History  He has a past surgical history that includes Hernia repair and Tonsillectomy.     Social History  He reports that he has been smoking cigarettes. He has a 23.00 pack-year smoking history. He has never used smokeless tobacco. He reports current alcohol use. He reports that he does not currently use drugs.    Family History  Family History   Problem Relation Name Age of Onset    Diabetes Mother      Cancer Mother      Diabetes Father      Liver disease Father      Esophageal cancer Brother      Throat cancer Maternal Grandfather          Allergies  Penicillins    ROS: 12 system review was completed and is negative with the exception of those signs and symptoms noted in the history of present illness: A 12 system review was completed and is negative with the exception of those signs and symptoms noted in the history of present illness.     Exam:  General: in NAD, appears stated age  Head: normocephalic, atraumatic  Respiratory: normal effort, no use of accessory muscles  Cardiovascular: no edema noted  Skin: normal turgor, no rashes  Neurologic: grossly intact, oriented to person/place/time  Psychiatric: mode and affect appropriate     Last Recorded Vitals  There were no vitals taken for this visit.    No results found for: \"PSASCREEN\", \"PSAR\", \"KSCOR\", \"CREATININE\", \"HGB\"      ASSESSMENT/PLAN:  58-year-old male with Peyronie's disease.  Needs to undergo a three-vessel CABG.  -We discussed diagnosis of Peyronie's disease, " management options including Xiaflex, tunica albuginea plication, inflatable penile prosthetic  -I suspect he will go for Xiaflex    #Erectile dysfunction  -We will try PDE 5 inhibitors after his recovery from CABG    Follow-up in 3 months    Torsten Richard MD

## 2023-11-03 ENCOUNTER — APPOINTMENT (OUTPATIENT)
Dept: SLEEP MEDICINE | Facility: HOSPITAL | Age: 58
End: 2023-11-03
Payer: COMMERCIAL

## 2023-11-05 LAB — ECHO BSA: 2.29 M2

## 2023-11-07 ENCOUNTER — OFFICE VISIT (OUTPATIENT)
Dept: PRIMARY CARE | Facility: CLINIC | Age: 58
End: 2023-11-07
Payer: COMMERCIAL

## 2023-11-07 VITALS
DIASTOLIC BLOOD PRESSURE: 86 MMHG | WEIGHT: 227 LBS | RESPIRATION RATE: 18 BRPM | OXYGEN SATURATION: 98 % | HEART RATE: 63 BPM | HEIGHT: 73 IN | BODY MASS INDEX: 30.09 KG/M2 | SYSTOLIC BLOOD PRESSURE: 128 MMHG | TEMPERATURE: 97.3 F

## 2023-11-07 DIAGNOSIS — R73.9 HYPERGLYCEMIA: Primary | ICD-10-CM

## 2023-11-07 DIAGNOSIS — I10 PRIMARY HYPERTENSION: ICD-10-CM

## 2023-11-07 DIAGNOSIS — E11.59 TYPE 2 DIABETES MELLITUS WITH CARDIAC COMPLICATION (MULTI): ICD-10-CM

## 2023-11-07 DIAGNOSIS — I25.10 CORONARY ARTERY DISEASE INVOLVING NATIVE CORONARY ARTERY OF NATIVE HEART WITHOUT ANGINA PECTORIS: ICD-10-CM

## 2023-11-07 DIAGNOSIS — R09.89 BILATERAL CAROTID BRUITS: ICD-10-CM

## 2023-11-07 PROBLEM — I25.2 HISTORY OF ACUTE ANTERIOR WALL MI: Status: RESOLVED | Noted: 2023-10-25 | Resolved: 2023-11-07

## 2023-11-07 PROBLEM — R00.2 PALPITATIONS: Status: RESOLVED | Noted: 2023-10-25 | Resolved: 2023-11-07

## 2023-11-07 PROBLEM — I51.9 HEART DISEASE: Status: RESOLVED | Noted: 2023-09-15 | Resolved: 2023-11-07

## 2023-11-07 PROBLEM — E78.5 HYPERLIPIDEMIA: Status: RESOLVED | Noted: 2023-09-15 | Resolved: 2023-11-07

## 2023-11-07 PROBLEM — R07.9 CHEST PAIN: Status: RESOLVED | Noted: 2023-10-25 | Resolved: 2023-11-07

## 2023-11-07 LAB — POC HEMOGLOBIN A1C: 6.7 % (ref 4.2–6.5)

## 2023-11-07 PROCEDURE — 3074F SYST BP LT 130 MM HG: CPT | Performed by: PHYSICIAN ASSISTANT

## 2023-11-07 PROCEDURE — 4010F ACE/ARB THERAPY RXD/TAKEN: CPT | Performed by: PHYSICIAN ASSISTANT

## 2023-11-07 PROCEDURE — 83036 HEMOGLOBIN GLYCOSYLATED A1C: CPT | Performed by: PHYSICIAN ASSISTANT

## 2023-11-07 PROCEDURE — 99214 OFFICE O/P EST MOD 30 MIN: CPT | Performed by: PHYSICIAN ASSISTANT

## 2023-11-07 PROCEDURE — 3079F DIAST BP 80-89 MM HG: CPT | Performed by: PHYSICIAN ASSISTANT

## 2023-11-07 RX ORDER — LANCETS
1 EACH MISCELLANEOUS DAILY
Qty: 100 EACH | Refills: 3 | Status: SHIPPED | OUTPATIENT
Start: 2023-11-07 | End: 2024-02-28

## 2023-11-07 RX ORDER — METFORMIN HYDROCHLORIDE 500 MG/1
500 TABLET, EXTENDED RELEASE ORAL
Qty: 180 TABLET | Refills: 1 | Status: SHIPPED | OUTPATIENT
Start: 2023-11-07 | End: 2023-11-27

## 2023-11-07 RX ORDER — INSULIN PUMP SYRINGE, 3 ML
1 EACH MISCELLANEOUS 3 TIMES DAILY PRN
Qty: 1 KIT | Refills: 0 | Status: SHIPPED | OUTPATIENT
Start: 2023-11-07

## 2023-11-07 NOTE — ASSESSMENT & PLAN NOTE
DIABETES MELLITUS, TYPE 2:  - New diagnosis  - I had a lengthy discussion with the pt explaining diabetes and ensuring that he understands his diagnosis and the reason for tx and monitoring.   - Hgb A1c is 6.7% today. I discussed this with the pt and the risks of T2DM including kidney disease, heart attacks, retinopathy, stroke, neuropathy.   - Plan is to begin metformin  mg daily and taper up to 1 g daily if tolerated. Should strongly consider SGLT2i and GLP1-RA in future due to pt's concomitant CAD  - Pt is advised to modify the diet (less carbs, less sugary) and to increase cardiovascular exercise.   - Will rx glucometer, test strips, lancets. Pt advised to monitor his fasting blood sugars and keep a log. The goal is to see fasting sugars progressively going down into the normal range with tx (80-130mg/dL). Pt encouraged to bring log of blood sugars to f/u visit.   - Pt expressed understanding and agreed to the plan.   - Pt should f/u in 3 months

## 2023-11-07 NOTE — ASSESSMENT & PLAN NOTE
- Hospital follow up today - admitted to [facility] 10/29 - 10/30 with stable angina  - Current sxs - left arm pain and SMALLWOOD   - Plan is to continue with metoprolol 25 mg BID, asa 81 mg, atorvastatin 80 mg, Imdur 30 mg and prn nitroglycerin   - Follow up with cardiac surgeon for CABG as scheduled   - Strongly encouraged he quit smoking - he tells me he's not ready yet   - Strongly encouraged better diet (new dx of T2DM today with A1c 6.7% - needs low carb/ low sugar and less saturated fats).   - No exercise right now until cleared by cardio.  - To ER if red flag s/sxs

## 2023-11-07 NOTE — PROGRESS NOTES
"Subjective   Patient ID: Liban Jaramillo is a 58 y.o. male who presents for Hospital Follow-up (Pt here today for a Hospital Follow up//Discharge Facility: [facility]/Discharge Diagnosis: Chest pain, unspecified type (Primary Dx); Angina at rest; Coronary artery disease of native artery of native heart with stable angina pectoris /Admission Date: 10/29/2023/Discharge Date: 10/30/2023//Since being out pt states he has to get a Triple Bypass and has Cardio follow up on 11/9 to see when surgery will be set up for. /).    HPI   Hospital follow up CAD   Cath 10/30/23 LVEF 60%  LM 50%  LAD tortuous, diffuse dz, 80% mid an ddistal   CX large, tortuous 95-99%   RCA large, mod diffuse dz   BNP 53  Needs CABG - had appt scheduled with Dr. Wu for 11/1/23 which was cancelled. Now has appt with cardiac surgeon Dr. Cheryl Murphy 11/9/23  Still having left arm aching, no CP though   Still SMALLWOOD   Now on metoprolol 25 mg BID, Asa 81 mg, atorvastatin 80 mg (keeps forgetting this one), Imdur 30 mg, prn nitroglycerin   Strong family h/o T2Dm (mom, dad, sister)    Anxiety/ depression/ ADHD:  Scheduled to see psych at [facility] 11/21/23    Right ear ringing   - may be related to the Aspirin   - need to keep ASA due to HIGH cardiac risk     Review of Systems  As noted in hpi     Objective   /86   Pulse 63   Temp 36.3 °C (97.3 °F)   Resp 18   Ht 1.854 m (6' 1\")   Wt 103 kg (227 lb)   SpO2 98%   BMI 29.95 kg/m²     Physical Exam  Constitutional:       General: He is not in acute distress.     Appearance: Normal appearance. He is not ill-appearing.   HENT:      Head: Normocephalic.   Cardiovascular:      Rate and Rhythm: Normal rate and regular rhythm.      Pulses: Normal pulses.      Heart sounds: Normal heart sounds. No murmur heard.  Pulmonary:      Effort: Pulmonary effort is normal.      Breath sounds: Normal breath sounds.   Neurological:      Mental Status: He is alert.   Psychiatric:         Mood and Affect: " Mood normal.         Behavior: Behavior normal.         Assessment/Plan     Problem List Items Addressed This Visit       Hypertension    Overview     - Stable on current meds - lisinopril 20 mg, metoprolol 25 mg BID          Current Assessment & Plan     - Continue current tx plan         RESOLVED: Hyperglycemia - Primary    Relevant Orders    POCT glycosylated hemoglobin (Hb A1C) manually resulted (Completed)    Bilateral carotid bruits    Overview     - Carotid US 11/16/23          Coronary artery disease involving native coronary artery of native heart without angina pectoris    Overview     - Cardiac cath 10/30/23 - LM 50%, LAD 90%, Cx 95-99%, RCA moderate diffuse plaque. LVEF 60% (bnp 53).  - Needs CABG. Appt scheduled with cardiothoracic surgeon Dr. Cheryl Murphy 11/9/23.    - New pt appt with cardiologist, Dr. Wallace, on 11/27/23         Current Assessment & Plan     - Hospital follow up today - admitted to [facility] 10/29 - 10/30 with stable angina  - Current sxs - left arm pain and SMALLWOOD   - Plan is to continue with metoprolol 25 mg BID, asa 81 mg, atorvastatin 80 mg, Imdur 30 mg and prn nitroglycerin   - Follow up with cardiac surgeon for CABG as scheduled   - Strongly encouraged he quit smoking - he tells me he's not ready yet   - Strongly encouraged better diet (new dx of T2DM today with A1c 6.7% - needs low carb/ low sugar and less saturated fats).   - No exercise right now until cleared by cardio.  - To ER if red flag s/sxs          Type 2 diabetes mellitus with cardiac complication (CMS/HCC)    Overview     - New dx 11/7/23 with A1c 6.7% (strong fhx T2DM both parents, sister)          Current Assessment & Plan     DIABETES MELLITUS, TYPE 2:  - New diagnosis  - I had a lengthy discussion with the pt explaining diabetes and ensuring that he understands his diagnosis and the reason for tx and monitoring.   - Hgb A1c is 6.7% today. I discussed this with the pt and the risks of T2DM including kidney disease,  heart attacks, retinopathy, stroke, neuropathy.   - Plan is to begin metformin  mg daily and taper up to 1 g daily if tolerated. Should strongly consider SGLT2i and GLP1-RA in future due to pt's concomitant CAD  - Pt is advised to modify the diet (less carbs, less sugary) and to increase cardiovascular exercise.   - Will rx glucometer, test strips, lancets. Pt advised to monitor his fasting blood sugars and keep a log. The goal is to see fasting sugars progressively going down into the normal range with tx (80-130mg/dL). Pt encouraged to bring log of blood sugars to f/u visit.   - Pt expressed understanding and agreed to the plan.   - Pt should f/u in 3 months          Relevant Medications    metFORMIN XR (Glucophage-XR) 500 mg 24 hr tablet    lancets misc    FreeStyle glucose monitoring kit    blood sugar diagnostic strip    Other Relevant Orders    Follow Up In [facility] - PCP - Established    Albumin , Urine Random

## 2023-11-09 ENCOUNTER — APPOINTMENT (OUTPATIENT)
Dept: CARDIAC SURGERY | Facility: CLINIC | Age: 58
End: 2023-11-09
Payer: COMMERCIAL

## 2023-11-14 ENCOUNTER — PATIENT OUTREACH (OUTPATIENT)
Dept: PRIMARY CARE | Facility: CLINIC | Age: 58
End: 2023-11-14

## 2023-11-14 NOTE — PROGRESS NOTES
Unable to reach patient for call back after patient's follow up appointment with PCP.   JENNIFERM with call back number for patient to call if needed   If no voicemail available call attempts x 2 were made to contact the patient to assist with any questions or concerns patient may have.

## 2023-11-16 ENCOUNTER — ANCILLARY PROCEDURE (OUTPATIENT)
Dept: CARDIOLOGY | Facility: CLINIC | Age: 58
End: 2023-11-16
Payer: COMMERCIAL

## 2023-11-16 ENCOUNTER — CLINICAL SUPPORT (OUTPATIENT)
Dept: CARDIOLOGY | Facility: CLINIC | Age: 58
End: 2023-11-16
Payer: COMMERCIAL

## 2023-11-16 VITALS
BODY MASS INDEX: 29.95 KG/M2 | SYSTOLIC BLOOD PRESSURE: 144 MMHG | DIASTOLIC BLOOD PRESSURE: 94 MMHG | HEIGHT: 73 IN | WEIGHT: 226 LBS

## 2023-11-16 DIAGNOSIS — I10 HYPERTENSION, UNSPECIFIED TYPE: ICD-10-CM

## 2023-11-16 DIAGNOSIS — Z82.49 FAMILY HISTORY OF ISCHEMIC HEART DISEASE: ICD-10-CM

## 2023-11-16 DIAGNOSIS — M12.812 ROTATOR CUFF ARTHROPATHY, LEFT: ICD-10-CM

## 2023-11-16 DIAGNOSIS — I25.2 HISTORY OF ACUTE ANTERIOR WALL MI: ICD-10-CM

## 2023-11-16 DIAGNOSIS — R09.89 BILATERAL CAROTID BRUITS: ICD-10-CM

## 2023-11-16 DIAGNOSIS — G47.33 OSA (OBSTRUCTIVE SLEEP APNEA): ICD-10-CM

## 2023-11-16 DIAGNOSIS — E07.9 THYROID DISEASE: ICD-10-CM

## 2023-11-16 DIAGNOSIS — E78.2 MIXED HYPERLIPIDEMIA: ICD-10-CM

## 2023-11-16 DIAGNOSIS — R73.9 HYPERGLYCEMIA: ICD-10-CM

## 2023-11-16 DIAGNOSIS — M54.2 NECK PAIN: ICD-10-CM

## 2023-11-16 DIAGNOSIS — Z72.0 TOBACCO ABUSE: ICD-10-CM

## 2023-11-16 DIAGNOSIS — R06.09 DOE (DYSPNEA ON EXERTION): ICD-10-CM

## 2023-11-16 DIAGNOSIS — R94.31 ABNORMAL EKG: ICD-10-CM

## 2023-11-16 DIAGNOSIS — R06.83 SNORES: ICD-10-CM

## 2023-11-16 DIAGNOSIS — M54.10 RADICULAR PAIN: ICD-10-CM

## 2023-11-16 DIAGNOSIS — E03.9 HYPOTHYROIDISM, UNSPECIFIED TYPE: ICD-10-CM

## 2023-11-16 DIAGNOSIS — R07.9 CHEST PAIN, UNSPECIFIED TYPE: ICD-10-CM

## 2023-11-16 DIAGNOSIS — R00.2 PALPITATIONS: ICD-10-CM

## 2023-11-16 DIAGNOSIS — M79.602 PAIN OF LEFT UPPER EXTREMITY: ICD-10-CM

## 2023-11-16 LAB
AORTIC VALVE MEAN GRADIENT: 5
AORTIC VALVE PEAK VELOCITY: 1.61
AV PEAK GRADIENT: 10.4
AVA (PEAK VEL): 3.54
AVA (VTI): 3.87
EJECTION FRACTION APICAL 4 CHAMBER: 63.1
LEFT VENTRICLE INTERNAL DIMENSION DIASTOLE: 5.08 (ref 3.5–6)
LEFT VENTRICULAR OUTFLOW TRACT DIAMETER: 2.5
MITRAL VALVE E/A RATIO: 0.8

## 2023-11-16 PROCEDURE — 93880 EXTRACRANIAL BILAT STUDY: CPT

## 2023-11-16 PROCEDURE — 93880 EXTRACRANIAL BILAT STUDY: CPT | Performed by: INTERNAL MEDICINE

## 2023-11-16 PROCEDURE — 93306 TTE W/DOPPLER COMPLETE: CPT

## 2023-11-16 PROCEDURE — 93306 TTE W/DOPPLER COMPLETE: CPT | Performed by: INTERNAL MEDICINE

## 2023-11-27 ENCOUNTER — OFFICE VISIT (OUTPATIENT)
Dept: CARDIOLOGY | Facility: CLINIC | Age: 58
End: 2023-11-27
Payer: COMMERCIAL

## 2023-11-27 VITALS
SYSTOLIC BLOOD PRESSURE: 109 MMHG | WEIGHT: 222 LBS | BODY MASS INDEX: 29.29 KG/M2 | HEART RATE: 79 BPM | DIASTOLIC BLOOD PRESSURE: 72 MMHG

## 2023-11-27 DIAGNOSIS — E03.9 ACQUIRED HYPOTHYROIDISM: ICD-10-CM

## 2023-11-27 DIAGNOSIS — R07.9 CHEST PAIN, UNSPECIFIED TYPE: ICD-10-CM

## 2023-11-27 DIAGNOSIS — I25.10 CORONARY ARTERY DISEASE INVOLVING NATIVE CORONARY ARTERY OF NATIVE HEART WITHOUT ANGINA PECTORIS: ICD-10-CM

## 2023-11-27 DIAGNOSIS — G47.33 OSA (OBSTRUCTIVE SLEEP APNEA): ICD-10-CM

## 2023-11-27 DIAGNOSIS — E11.59 TYPE 2 DIABETES MELLITUS WITH CARDIAC COMPLICATION (MULTI): ICD-10-CM

## 2023-11-27 DIAGNOSIS — E07.9 THYROID DISEASE: ICD-10-CM

## 2023-11-27 DIAGNOSIS — R06.83 SNORES: ICD-10-CM

## 2023-11-27 DIAGNOSIS — Z82.49 FAMILY HISTORY OF ISCHEMIC HEART DISEASE: ICD-10-CM

## 2023-11-27 DIAGNOSIS — Z72.0 TOBACCO ABUSE: ICD-10-CM

## 2023-11-27 DIAGNOSIS — N52.01 ERECTILE DYSFUNCTION DUE TO ARTERIAL INSUFFICIENCY: ICD-10-CM

## 2023-11-27 DIAGNOSIS — R00.2 PALPITATIONS: ICD-10-CM

## 2023-11-27 DIAGNOSIS — M79.602 PAIN OF LEFT UPPER EXTREMITY: ICD-10-CM

## 2023-11-27 DIAGNOSIS — R06.09 DOE (DYSPNEA ON EXERTION): ICD-10-CM

## 2023-11-27 DIAGNOSIS — R94.31 ABNORMAL EKG: ICD-10-CM

## 2023-11-27 DIAGNOSIS — M54.10 RADICULAR PAIN: ICD-10-CM

## 2023-11-27 DIAGNOSIS — E78.2 MIXED HYPERLIPIDEMIA: ICD-10-CM

## 2023-11-27 DIAGNOSIS — M12.812 ROTATOR CUFF ARTHROPATHY, LEFT: ICD-10-CM

## 2023-11-27 DIAGNOSIS — I10 PRIMARY HYPERTENSION: Primary | ICD-10-CM

## 2023-11-27 DIAGNOSIS — R73.9 HYPERGLYCEMIA: ICD-10-CM

## 2023-11-27 DIAGNOSIS — R09.89 BILATERAL CAROTID BRUITS: ICD-10-CM

## 2023-11-27 DIAGNOSIS — I25.2 HISTORY OF ACUTE ANTERIOR WALL MI: ICD-10-CM

## 2023-11-27 PROCEDURE — 4010F ACE/ARB THERAPY RXD/TAKEN: CPT | Performed by: INTERNAL MEDICINE

## 2023-11-27 PROCEDURE — 99215 OFFICE O/P EST HI 40 MIN: CPT | Performed by: INTERNAL MEDICINE

## 2023-11-27 PROCEDURE — 3078F DIAST BP <80 MM HG: CPT | Performed by: INTERNAL MEDICINE

## 2023-11-27 PROCEDURE — 3074F SYST BP LT 130 MM HG: CPT | Performed by: INTERNAL MEDICINE

## 2023-11-27 PROCEDURE — 4004F PT TOBACCO SCREEN RCVD TLK: CPT | Performed by: INTERNAL MEDICINE

## 2023-11-27 RX ORDER — NITROGLYCERIN 0.4 MG/1
0.4 TABLET SUBLINGUAL EVERY 5 MIN PRN
Qty: 90 TABLET | Refills: 1 | Status: SHIPPED | OUTPATIENT
Start: 2023-11-27 | End: 2024-01-08

## 2023-11-27 RX ORDER — ISOSORBIDE MONONITRATE 30 MG/1
30 TABLET, EXTENDED RELEASE ORAL DAILY
Qty: 90 TABLET | Refills: 1 | Status: ON HOLD | OUTPATIENT
Start: 2023-11-27 | End: 2023-12-11 | Stop reason: SDUPTHER

## 2023-11-27 RX ORDER — METOPROLOL TARTRATE 50 MG/1
50 TABLET ORAL 2 TIMES DAILY
Qty: 180 TABLET | Refills: 1 | Status: ON HOLD | OUTPATIENT
Start: 2023-11-27 | End: 2023-12-11 | Stop reason: SDUPTHER

## 2023-11-27 RX ORDER — ATORVASTATIN CALCIUM 80 MG/1
80 TABLET, FILM COATED ORAL DAILY
Qty: 90 TABLET | Refills: 1 | Status: SHIPPED | OUTPATIENT
Start: 2023-11-27 | End: 2024-01-09 | Stop reason: SDUPTHER

## 2023-11-27 RX ORDER — LISINOPRIL 20 MG/1
20 TABLET ORAL DAILY
Qty: 90 TABLET | Refills: 1 | Status: ON HOLD | OUTPATIENT
Start: 2023-11-27 | End: 2023-12-11 | Stop reason: SDUPTHER

## 2023-11-27 RX ORDER — ASPIRIN 81 MG/1
81 TABLET ORAL
Qty: 90 TABLET | Refills: 1 | Status: SHIPPED | OUTPATIENT
Start: 2023-11-27 | End: 2024-01-09 | Stop reason: SDUPTHER

## 2023-11-27 RX ORDER — RANOLAZINE 500 MG/1
500 TABLET, EXTENDED RELEASE ORAL 2 TIMES DAILY
Qty: 180 TABLET | Refills: 1 | Status: SHIPPED | OUTPATIENT
Start: 2023-11-27 | End: 2023-12-11 | Stop reason: HOSPADM

## 2023-11-27 NOTE — PROGRESS NOTES
CARDIOLOGY OFFICE NOTE    Date:   11/27/2023    Patient:    Liban Jaramillo    YOB: 1965    Primary Physician: Joselin Simon PA-C       Reason for Visit: Post hospital follow-up.    HPI:     Liban Jaramillo was seen in cardiac evaluation at the  Cardiology office November 27, 2023.      The patients problems are listed as in the impression below.    Electronic medical records reviewed.    Patient was scheduled for cardiac catheterization and laboratory studies.  He had recurrent angina and required evaluation at [facility] emergency room.  He saw Dr. Casillas who performed cardiac catheterization there.  This showed 90% LAD and 95% left circumflex as well as 50% left main.  Right coronary was without significant disease.  Left ventricular ejection fraction was normal at 60%.    Dr. Casillas felt that his findings warranted bypass surgery and he was referred to cardiovascular surgery at [facility].  Patient is pending consultation at this time.    He states that overall he feels well.  He still has some intermittent left arm discomfort.  He does have left rotator cuff injury as well as cervical disc disease with radiculopathy.    Patient denies Chest Pain, SOB, Lightheadedness, Dizziness, TIA or CVA symptoms.  No CHF or Edema.  No Palpitations.  No GI,  or Bleeding Issues. No Recent Fever or Chills.     Cardiovascular and general review of systems is otherwise negative.    A 14-system review is otherwise negative, other than noted.     PHYSICAL EXAMINATION:      Vitals:    11/27/23 1324   BP: 109/72   Pulse: 79     General: No acute distress. Vital signs as noted. Alert and oriented.  Head And Neck Examination: No jugular venous distention, 2/6 bilateral carotid bruits, no mass. Carotid upstrokes preserved. Oral mucosa moist.  No xanthelasma. Head and neck examination otherwise unremarkable.  Lungs: Clear to auscultation and percussion. No wheezes, no rales,  and no rhonchi.  Chest: Excursion appeared to be  normal. No chest wall tenderness on palpation.  Heart: Normal S1 and S2. No S3. No S4. No rub. Grade 1/6 systolic murmur, best heard at the left sternal border. Point of maximal impulse was within normal limits.  Abdomen: Soft. Nontender. No organomegaly. No bruits. No masses. Obese.  Extremities: No bipedal edema. No clubbing. No cyanosis.  Pulses are strong throughout. No bruits.  Musculoskeletal Exam: No ulcers, otherwise unremarkable.  Neuro: Neurologically appeared grossly intact.  .  IMPRESSION:       Chest pain symptoms, consistent for angina  Dyspnea on exertion  Palpitations  Snoring  Abnormal ECG concerning for prior MI  Coronary artery disease, post cardiac catheterization 11/2023 (90% LAD, 95% left circumflex, 50% left main), pending this cardiothoracic surgery consultation.  Normal LV systolic function, LVEF 60%.  Hyperdynamic left ventricular function by echocardiogram 11/2023.  Aortic valve sclerosis without stenosis  Hypertension  Hyperlipidemia  Hypothyroidism recently diagnosed  Hyperglycemic  Cervical disc disease with radiculopathy.  Carotid bruits.  Negative carotid ultrasound 11/2023  Degenerative joint disease with left rotator cuff disease.  Alcohol history.  Tobacco history  Family history of coronary artery disease  Otherwise as per assessment below.    RECOMMENDATIONS:      Patient has above-noted findings.  He has significant coronary artery disease including left main disease for which cardiothoracic surgery is being consulted for coronary bypass revascularization.    Would suggest changing Toprol-XL to 50 mg daily in the evening.  Patient will continue other medications including Ranexa.  Refills were provided.    Exercise dietary program was encouraged.  Hydration.    MyChart portal use was encouraged.    We will plan to see back in 6 weeks (hopefully post bypass) with Laboratory Studies and ECG as ordered.     Patient will follow up with their primary physician for general  care.    The patient knows to contact medical care earlier if need be.      ALLERGIES:     Allergies   Allergen Reactions    Penicillins Anaphylaxis, Hives, Rash and Swelling     MEDICATIONS:     Current Outpatient Medications   Medication Instructions    Advair HFA 45-21 mcg/actuation inhaler 2 puffs, inhalation, 2 times daily RT    aspirin 81 mg, oral, Daily before breakfast    atorvastatin (LIPITOR) 80 mg, oral, Daily    blood sugar diagnostic strip 1 each, miscellaneous, Daily    FreeStyle glucose monitoring kit 1 each, miscellaneous, 3 times daily PRN    isosorbide mononitrate ER (IMDUR) 30 mg, oral, Daily, Do not crush or chew.    lancets misc 1 each, miscellaneous, Daily    levothyroxine (SYNTHROID, LEVOXYL) 88 mcg, oral, Daily    lisinopril 20 mg, oral, Daily    metoprolol tartrate (LOPRESSOR) 25 mg, oral, 2 times daily    nitroglycerin (Nitrostat) 0.4 mg SL tablet     ranolazine (Ranexa) 500 mg 12 hr tablet 1 tablet, oral, 2 times daily     ELECTROCARDIOGRAM:      None    CARDIAC TESTING:      Echocardiogram: Ejection fraction 7075%, left ventricular hypertrophy.  Normal diastolic dysfunction.  AV valve sclerosis without stenosis.    Carotid ultrasound: Negative less than 50% bilateral ICAs.    Cardiac catheterization: 90% LAD, 95% left circumflex, 50% left main.  LVEF 60%.    LABORATORY DATA:      as noted below.    All above testing was personally reviewed.    HgBA1c:    Lab Results   Component Value Date    HGBA1C 6.7 (A) 11/07/2023                 PROBLEM LIST:     Patient Active Problem List   Diagnosis    Hypothyroidism    Hypertension    Abnormal EKG    Family history of ischemic heart disease    SMALLWOOD (dyspnea on exertion)    Pain of left upper extremity    Tobacco abuse    Neck pain    Radicular pain    Rotator cuff arthropathy, left    Snores    ANA (obstructive sleep apnea)    Bilateral carotid bruits    Peyronie disease    Erectile dysfunction    Coronary artery disease involving native coronary  artery of native heart without angina pectoris    Type 2 diabetes mellitus with cardiac complication (CMS/[facility])             Natanael Wallace MD, [facility] /  Cardiology      Of Note:  [employer] voice recognition dictation software was utilized partially in the preparation of this note, therefore, inaccuracies in spelling, word choice and punctuation may have occurred which were not recognized the time of signing.    Patient was seen and examined with total time of visit including chart preparation, rooming, and chart completion exceeding 40 minutes.      ----

## 2023-11-30 ENCOUNTER — PREP FOR PROCEDURE (OUTPATIENT)
Dept: CARDIOLOGY | Facility: HOSPITAL | Age: 58
End: 2023-11-30

## 2023-11-30 ENCOUNTER — OFFICE VISIT (OUTPATIENT)
Dept: CARDIAC SURGERY | Facility: CLINIC | Age: 58
End: 2023-11-30
Payer: COMMERCIAL

## 2023-11-30 VITALS
WEIGHT: 219 LBS | BODY MASS INDEX: 29.03 KG/M2 | OXYGEN SATURATION: 95 % | SYSTOLIC BLOOD PRESSURE: 149 MMHG | RESPIRATION RATE: 16 BRPM | HEART RATE: 88 BPM | DIASTOLIC BLOOD PRESSURE: 98 MMHG | HEIGHT: 73 IN | TEMPERATURE: 97.2 F

## 2023-11-30 DIAGNOSIS — I25.110 CORONARY ARTERY DISEASE INVOLVING NATIVE CORONARY ARTERY OF NATIVE HEART WITH UNSTABLE ANGINA PECTORIS (MULTI): Primary | ICD-10-CM

## 2023-11-30 DIAGNOSIS — I25.10 CORONARY ARTERY DISEASE INVOLVING NATIVE CORONARY ARTERY OF NATIVE HEART WITHOUT ANGINA PECTORIS: ICD-10-CM

## 2023-11-30 PROCEDURE — 4010F ACE/ARB THERAPY RXD/TAKEN: CPT | Performed by: THORACIC SURGERY (CARDIOTHORACIC VASCULAR SURGERY)

## 2023-11-30 PROCEDURE — 99213 OFFICE O/P EST LOW 20 MIN: CPT | Performed by: THORACIC SURGERY (CARDIOTHORACIC VASCULAR SURGERY)

## 2023-11-30 PROCEDURE — 99203 OFFICE O/P NEW LOW 30 MIN: CPT | Performed by: THORACIC SURGERY (CARDIOTHORACIC VASCULAR SURGERY)

## 2023-11-30 PROCEDURE — 3077F SYST BP >= 140 MM HG: CPT | Performed by: THORACIC SURGERY (CARDIOTHORACIC VASCULAR SURGERY)

## 2023-11-30 PROCEDURE — 4004F PT TOBACCO SCREEN RCVD TLK: CPT | Performed by: THORACIC SURGERY (CARDIOTHORACIC VASCULAR SURGERY)

## 2023-11-30 PROCEDURE — 3080F DIAST BP >= 90 MM HG: CPT | Performed by: THORACIC SURGERY (CARDIOTHORACIC VASCULAR SURGERY)

## 2023-11-30 NOTE — H&P
History Of Present Illness  Liban Jaramillo is a 58 y.o. male presenting with cad.     Past Medical History  No past medical history on file.    Surgical History  Past Surgical History:   Procedure Laterality Date    HERNIA REPAIR      TONSILLECTOMY          Social History  He reports that he has been smoking cigarettes. He has a 23.00 pack-year smoking history. He has never used smokeless tobacco. He reports current alcohol use. He reports that he does not currently use drugs.    Family History  Family History   Problem Relation Name Age of Onset    Diabetes Mother      Cancer Mother      Diabetes Father      Liver disease Father      Esophageal cancer Brother      Throat cancer Maternal Grandfather          Allergies  Penicillins    Review of Systems     Physical Exam     Last Recorded Vitals  There were no vitals taken for this visit.    Relevant Results               Assessment/Plan       cabg       I spent 30 minutes in the professional and overall care of this patient.      Ger Murphy MD

## 2023-11-30 NOTE — PROGRESS NOTES
Subjective   Liban Jaramillo is a 58 y.o. male referred by cardiology for coronary bypass grafting. He reports chest pain, chest pressure/discomfort, and dyspnea. He denies near-syncope and syncope.      No past medical history on file.  Past Surgical History:   Procedure Laterality Date    HERNIA REPAIR      TONSILLECTOMY       Family History   Problem Relation Name Age of Onset    Diabetes Mother      Cancer Mother      Diabetes Father      Liver disease Father      Esophageal cancer Brother      Throat cancer Maternal Grandfather         Allergies   Allergen Reactions    Penicillins Anaphylaxis, Hives, Rash and Swelling         Current Outpatient Medications:     Advair HFA 45-21 mcg/actuation inhaler, Inhale 2 puffs 2 times a day., Disp: , Rfl:     aspirin 81 mg EC tablet, Take 1 tablet (81 mg) by mouth once daily in the morning. Take before meals., Disp: 90 tablet, Rfl: 1    atorvastatin (Lipitor) 80 mg tablet, Take 1 tablet (80 mg) by mouth once daily., Disp: 90 tablet, Rfl: 1    blood sugar diagnostic strip, 1 each once daily., Disp: 100 strip, Rfl: 3    FreeStyle glucose monitoring kit, 1 each 3 times a day as needed (for low blood sugar symptoms)., Disp: 1 kit, Rfl: 0    isosorbide mononitrate ER (Imdur) 30 mg 24 hr tablet, Take 1 tablet (30 mg) by mouth once daily., Disp: 90 tablet, Rfl: 1    lancets misc, 1 each once daily., Disp: 100 each, Rfl: 3    levothyroxine (Synthroid, Levoxyl) 88 mcg tablet, Take 1 tablet (88 mcg) by mouth once daily., Disp: 60 tablet, Rfl: 0    lisinopril 20 mg tablet, Take 1 tablet (20 mg) by mouth once daily., Disp: 90 tablet, Rfl: 1    metoprolol tartrate (Lopressor) 50 mg tablet, Take 1 tablet by mouth 2 times a day., Disp: 180 tablet, Rfl: 1    nitroglycerin (Nitrostat) 0.4 mg SL tablet, Place 1 tablet (0.4 mg) under the tongue every 5 minutes if needed for chest pain., Disp: 90 tablet, Rfl: 1    ranolazine (Ranexa) 500 mg 12 hr tablet, Take 1 tablet (500 mg) by mouth 2 times a  "day., Disp: 180 tablet, Rfl: 1    Review of systems negative       Objective   Cardiac Studies  Cardiac catheterization revealed anterior ischemia.    EF: >50%  Vessels: Triple vessel disease: RCA, LAD, and Circumflex    Physical Exam  General: He is a pleasant male currently in no distress.  BP (!) 149/98 (BP Location: Right arm, Patient Position: Sitting)   Pulse 88   Temp 36.2 °C (97.2 °F) (Temporal)   Resp 16   Ht 1.854 m (6' 1\")   Wt 99.3 kg (219 lb)   SpO2 95%   BMI 28.89 kg/m²    Body mass index is 28.89 kg/m².   HEENT: Normocephalic and atraumatic. PERRLA. EOMs are full. Dentition is unremarkable.  NECK: Supple without thyromegaly, masses, or carotid bruits.  CHEST: Clear.  HEART: Regular rate and rhythm.  ABDOMEN: Soft, flat, nontender without organomegaly or masses.  NEUROLOGIC: Unremarkable.  EXTREMITIES: Unremarkable. Pedal pulses are palpable.    Data Review:       Assessment/Plan   Unstable angina . Chest pain at rest. Cabg next thrusday    Problem List Items Addressed This Visit       Coronary artery disease involving native coronary artery of native heart without angina pectoris    Relevant Orders    CT chest wo IV contrast       Orders Placed This Encounter   Procedures    CT chest wo IV contrast     Standing Status:   Future     Standing Expiration Date:   11/30/2024     Order Specific Question:   Reason for exam:     Answer:   pre-operative cabg     Order Specific Question:   What is the patient's sedation requirement?     Answer:   No Sedation     Order Specific Question:   Did the patient have a similar exam previously at a location outside of ?     Answer:   No     Order Specific Question:   Radiologist to Determine Optimal Study     Answer:   Yes     Order Specific Question:   Release result to [employer]     Answer:   Immediate [1]     Order Specific Question:   Is this exam part of a Research Study? If Yes, link this order to the research study     Answer:   No                  "

## 2023-11-30 NOTE — PROGRESS NOTES
Subjective   Patient is a 58 y.o. male who presents with aortic valve disease secondary to { ETIOLOGY:28051}. Current symptoms that the patient reports are {SYMPTOMS:69262}. Echocardiography reveals {ECHO FINDINGS:24809}. Other cardiac history includes {diagnoses; cardiac:08372}. Additional pertinant findings include { FINDINGS:91165}. Cardiac risk factors include {findings; risks cardiac:26354}.    Patient Active Problem List    Diagnosis Date Noted    Coronary artery disease involving native coronary artery of native heart without angina pectoris 2023    Type 2 diabetes mellitus with cardiac complication (CMS/HCC) 2023    Peyronie disease 2023    Erectile dysfunction 2023    Abnormal EKG 10/25/2023    Family history of ischemic heart disease 10/25/2023    SMALLWOOD (dyspnea on exertion) 10/25/2023    Pain of left upper extremity 10/25/2023    Tobacco abuse 10/25/2023    Neck pain 10/25/2023    Radicular pain 10/25/2023    Rotator cuff arthropathy, left 10/25/2023    Snores 10/25/2023    ANA (obstructive sleep apnea) 10/25/2023    Bilateral carotid bruits 10/25/2023    Hypothyroidism 09/15/2023    Hypertension 09/15/2023     No past medical history on file.   Past Surgical History:   Procedure Laterality Date    HERNIA REPAIR      TONSILLECTOMY        (Not in a hospital admission)      Review of systems negative except for ***.      Data Review: {icu labs:44772}    ECG: {EC}

## 2023-12-01 ENCOUNTER — APPOINTMENT (OUTPATIENT)
Dept: RADIOLOGY | Facility: HOSPITAL | Age: 58
End: 2023-12-01
Payer: COMMERCIAL

## 2023-12-04 DIAGNOSIS — I25.10 CORONARY ARTERY DISEASE INVOLVING NATIVE CORONARY ARTERY OF NATIVE HEART, UNSPECIFIED WHETHER ANGINA PRESENT: ICD-10-CM

## 2023-12-04 RX ORDER — MUPIROCIN 20 MG/G
OINTMENT TOPICAL
COMMUNITY
Start: 2023-11-30 | End: 2023-12-11 | Stop reason: HOSPADM

## 2023-12-04 NOTE — PREPROCEDURE INSTRUCTIONS
Reviewed pre-op instructions with patient including NPO after midnight, must have , hospital and check in location, and day of surgery routine. Aware to use Mupirocin ointment and CHG bathing cloths as directed by Dr. Cheryl Murphy's office.   Patient is aware to take Metoprolol the morning of the procedure with a sip of water.   Patient states he is coming in for pre-op lab work to [facility] on 12/5/23- hours and location provided.

## 2023-12-06 ENCOUNTER — HOSPITAL ENCOUNTER (OUTPATIENT)
Dept: RADIOLOGY | Facility: HOSPITAL | Age: 58
Discharge: HOME | End: 2023-12-06
Payer: COMMERCIAL

## 2023-12-06 ENCOUNTER — DOCUMENTATION (OUTPATIENT)
Dept: CARDIOLOGY | Facility: CLINIC | Age: 58
End: 2023-12-06
Payer: COMMERCIAL

## 2023-12-06 ENCOUNTER — LAB (OUTPATIENT)
Dept: LAB | Facility: HOSPITAL | Age: 58
End: 2023-12-06
Payer: COMMERCIAL

## 2023-12-06 ENCOUNTER — HOSPITAL ENCOUNTER (OUTPATIENT)
Dept: CARDIOLOGY | Facility: HOSPITAL | Age: 58
Discharge: HOME | End: 2023-12-06
Payer: COMMERCIAL

## 2023-12-06 DIAGNOSIS — E03.9 HYPOTHYROIDISM, UNSPECIFIED TYPE: ICD-10-CM

## 2023-12-06 DIAGNOSIS — G47.33 OSA (OBSTRUCTIVE SLEEP APNEA): ICD-10-CM

## 2023-12-06 DIAGNOSIS — E07.9 THYROID DISEASE: ICD-10-CM

## 2023-12-06 DIAGNOSIS — R07.9 CHEST PAIN, UNSPECIFIED TYPE: ICD-10-CM

## 2023-12-06 DIAGNOSIS — R09.89 BILATERAL CAROTID BRUITS: ICD-10-CM

## 2023-12-06 DIAGNOSIS — R00.2 PALPITATIONS: ICD-10-CM

## 2023-12-06 DIAGNOSIS — Z82.49 FAMILY HISTORY OF ISCHEMIC HEART DISEASE: ICD-10-CM

## 2023-12-06 DIAGNOSIS — M54.2 NECK PAIN: ICD-10-CM

## 2023-12-06 DIAGNOSIS — E78.2 MIXED HYPERLIPIDEMIA: ICD-10-CM

## 2023-12-06 DIAGNOSIS — N52.01 ERECTILE DYSFUNCTION DUE TO ARTERIAL INSUFFICIENCY: ICD-10-CM

## 2023-12-06 DIAGNOSIS — E78.5 HYPERLIPIDEMIA, UNSPECIFIED HYPERLIPIDEMIA TYPE: ICD-10-CM

## 2023-12-06 DIAGNOSIS — I25.2 HISTORY OF ACUTE ANTERIOR WALL MI: ICD-10-CM

## 2023-12-06 DIAGNOSIS — R06.83 SNORES: ICD-10-CM

## 2023-12-06 DIAGNOSIS — E03.9 ACQUIRED HYPOTHYROIDISM: ICD-10-CM

## 2023-12-06 DIAGNOSIS — Z72.0 TOBACCO ABUSE: ICD-10-CM

## 2023-12-06 DIAGNOSIS — R06.09 DOE (DYSPNEA ON EXERTION): ICD-10-CM

## 2023-12-06 DIAGNOSIS — I10 HYPERTENSION, UNSPECIFIED TYPE: ICD-10-CM

## 2023-12-06 DIAGNOSIS — I10 PRIMARY HYPERTENSION: ICD-10-CM

## 2023-12-06 DIAGNOSIS — R73.9 HYPERGLYCEMIA: ICD-10-CM

## 2023-12-06 DIAGNOSIS — M12.812 ROTATOR CUFF ARTHROPATHY, LEFT: ICD-10-CM

## 2023-12-06 DIAGNOSIS — M79.602 PAIN OF LEFT UPPER EXTREMITY: ICD-10-CM

## 2023-12-06 DIAGNOSIS — M54.10 RADICULAR PAIN: ICD-10-CM

## 2023-12-06 DIAGNOSIS — I25.10 CORONARY ARTERY DISEASE INVOLVING NATIVE CORONARY ARTERY OF NATIVE HEART WITHOUT ANGINA PECTORIS: ICD-10-CM

## 2023-12-06 DIAGNOSIS — E11.59 TYPE 2 DIABETES MELLITUS WITH CARDIAC COMPLICATION (MULTI): ICD-10-CM

## 2023-12-06 DIAGNOSIS — R94.31 ABNORMAL EKG: ICD-10-CM

## 2023-12-06 DIAGNOSIS — Z00.00 ANNUAL PHYSICAL EXAM: ICD-10-CM

## 2023-12-06 DIAGNOSIS — M79.10 MUSCLE PAIN: ICD-10-CM

## 2023-12-06 LAB
ABO GROUP (TYPE) IN BLOOD: NORMAL
ALBUMIN SERPL BCP-MCNC: 4.4 G/DL (ref 3.4–5)
ALP SERPL-CCNC: 56 U/L (ref 33–120)
ALT SERPL W P-5'-P-CCNC: 54 U/L (ref 10–52)
ANION GAP SERPL CALC-SCNC: 15 MMOL/L (ref 10–20)
ANTIBODY SCREEN: NORMAL
APPEARANCE UR: CLEAR
APTT PPP: 33 SECONDS (ref 27–38)
AST SERPL W P-5'-P-CCNC: 39 U/L (ref 9–39)
BASOPHILS # BLD AUTO: 0.03 X10*3/UL (ref 0–0.1)
BASOPHILS NFR BLD AUTO: 0.4 %
BILIRUB DIRECT SERPL-MCNC: 0.1 MG/DL (ref 0–0.3)
BILIRUB SERPL-MCNC: 0.7 MG/DL (ref 0–1.2)
BILIRUB UR STRIP.AUTO-MCNC: NEGATIVE MG/DL
BUN SERPL-MCNC: 10 MG/DL (ref 6–23)
CALCIUM SERPL-MCNC: 9.6 MG/DL (ref 8.6–10.3)
CHLORIDE SERPL-SCNC: 103 MMOL/L (ref 98–107)
CHOLEST SERPL-MCNC: 145 MG/DL (ref 0–199)
CHOLESTEROL/HDL RATIO: 4.3
CK SERPL-CCNC: 266 U/L (ref 0–325)
CO2 SERPL-SCNC: 26 MMOL/L (ref 21–32)
COLOR UR: YELLOW
CREAT SERPL-MCNC: 0.88 MG/DL (ref 0.5–1.3)
CREAT UR-MCNC: 76.4 MG/DL (ref 20–370)
EOSINOPHIL # BLD AUTO: 0.31 X10*3/UL (ref 0–0.7)
EOSINOPHIL NFR BLD AUTO: 4.6 %
ERYTHROCYTE [DISTWIDTH] IN BLOOD BY AUTOMATED COUNT: 13.6 % (ref 11.5–14.5)
EST. AVERAGE GLUCOSE BLD GHB EST-MCNC: 134 MG/DL
GFR SERPL CREATININE-BSD FRML MDRD: >90 ML/MIN/1.73M*2
GLUCOSE SERPL-MCNC: 107 MG/DL (ref 74–99)
GLUCOSE UR STRIP.AUTO-MCNC: NEGATIVE MG/DL
HBA1C MFR BLD: 6.3 %
HCT VFR BLD AUTO: 48.5 % (ref 41–52)
HDLC SERPL-MCNC: 34.1 MG/DL
HGB BLD-MCNC: 16.4 G/DL (ref 13.5–17.5)
IMM GRANULOCYTES # BLD AUTO: 0.02 X10*3/UL (ref 0–0.7)
IMM GRANULOCYTES NFR BLD AUTO: 0.3 % (ref 0–0.9)
INR PPP: 1.1 (ref 0.9–1.1)
KETONES UR STRIP.AUTO-MCNC: NEGATIVE MG/DL
LDLC SERPL CALC-MCNC: 80 MG/DL
LEUKOCYTE ESTERASE UR QL STRIP.AUTO: NEGATIVE
LYMPHOCYTES # BLD AUTO: 2.26 X10*3/UL (ref 1.2–4.8)
LYMPHOCYTES NFR BLD AUTO: 33.6 %
MAGNESIUM SERPL-MCNC: 2 MG/DL (ref 1.6–2.4)
MCH RBC QN AUTO: 30.3 PG (ref 26–34)
MCHC RBC AUTO-ENTMCNC: 33.8 G/DL (ref 32–36)
MCV RBC AUTO: 90 FL (ref 80–100)
MICROALBUMIN UR-MCNC: <7 MG/L
MICROALBUMIN/CREAT UR: NORMAL MG/G{CREAT}
MONOCYTES # BLD AUTO: 0.58 X10*3/UL (ref 0.1–1)
MONOCYTES NFR BLD AUTO: 8.6 %
NEUTROPHILS # BLD AUTO: 3.53 X10*3/UL (ref 1.2–7.7)
NEUTROPHILS NFR BLD AUTO: 52.5 %
NITRITE UR QL STRIP.AUTO: NEGATIVE
NON HDL CHOLESTEROL: 111 MG/DL (ref 0–149)
NRBC BLD-RTO: 0 /100 WBCS (ref 0–0)
PH UR STRIP.AUTO: 7 [PH]
PLATELET # BLD AUTO: 222 X10*3/UL (ref 150–450)
POTASSIUM SERPL-SCNC: 4 MMOL/L (ref 3.5–5.3)
PROT SERPL-MCNC: 7.2 G/DL (ref 6.4–8.2)
PROT UR STRIP.AUTO-MCNC: NEGATIVE MG/DL
PROTHROMBIN TIME: 12.2 SECONDS (ref 9.8–12.8)
PSA SERPL-MCNC: 0.73 NG/ML
RBC # BLD AUTO: 5.41 X10*6/UL (ref 4.5–5.9)
RBC # UR STRIP.AUTO: NEGATIVE /UL
RH FACTOR (ANTIGEN D): NORMAL
SODIUM SERPL-SCNC: 140 MMOL/L (ref 136–145)
SP GR UR STRIP.AUTO: 1.01
T4 FREE SERPL-MCNC: 0.59 NG/DL (ref 0.61–1.12)
TRIGL SERPL-MCNC: 153 MG/DL (ref 0–149)
TSH SERPL-ACNC: 22.11 MIU/L (ref 0.44–3.98)
UROBILINOGEN UR STRIP.AUTO-MCNC: <2 MG/DL
VLDL: 31 MG/DL (ref 0–40)
WBC # BLD AUTO: 6.7 X10*3/UL (ref 4.4–11.3)

## 2023-12-06 PROCEDURE — 84443 ASSAY THYROID STIM HORMONE: CPT

## 2023-12-06 PROCEDURE — 93005 ELECTROCARDIOGRAM TRACING: CPT

## 2023-12-06 PROCEDURE — 83735 ASSAY OF MAGNESIUM: CPT

## 2023-12-06 PROCEDURE — 82248 BILIRUBIN DIRECT: CPT

## 2023-12-06 PROCEDURE — 71250 CT THORAX DX C-: CPT | Performed by: RADIOLOGY

## 2023-12-06 PROCEDURE — 84153 ASSAY OF PSA TOTAL: CPT

## 2023-12-06 PROCEDURE — 36415 COLL VENOUS BLD VENIPUNCTURE: CPT

## 2023-12-06 PROCEDURE — 83036 HEMOGLOBIN GLYCOSYLATED A1C: CPT | Mod: ELYLAB

## 2023-12-06 PROCEDURE — 82570 ASSAY OF URINE CREATININE: CPT

## 2023-12-06 PROCEDURE — 82043 UR ALBUMIN QUANTITATIVE: CPT

## 2023-12-06 PROCEDURE — 80061 LIPID PANEL: CPT

## 2023-12-06 PROCEDURE — 85610 PROTHROMBIN TIME: CPT

## 2023-12-06 PROCEDURE — 93010 ELECTROCARDIOGRAM REPORT: CPT | Performed by: INTERNAL MEDICINE

## 2023-12-06 PROCEDURE — 86920 COMPATIBILITY TEST SPIN: CPT

## 2023-12-06 PROCEDURE — 85730 THROMBOPLASTIN TIME PARTIAL: CPT

## 2023-12-06 PROCEDURE — 84439 ASSAY OF FREE THYROXINE: CPT

## 2023-12-06 PROCEDURE — 85025 COMPLETE CBC W/AUTO DIFF WBC: CPT

## 2023-12-06 PROCEDURE — 86901 BLOOD TYPING SEROLOGIC RH(D): CPT

## 2023-12-06 PROCEDURE — 82550 ASSAY OF CK (CPK): CPT

## 2023-12-06 PROCEDURE — 81003 URINALYSIS AUTO W/O SCOPE: CPT

## 2023-12-06 PROCEDURE — 80053 COMPREHEN METABOLIC PANEL: CPT

## 2023-12-06 PROCEDURE — 71250 CT THORAX DX C-: CPT

## 2023-12-06 NOTE — PROGRESS NOTES
Patient seen today in pre op testing to discuss The LeAAPS Trial per Dr. Cheryl Murphy.  Patient was given pamphlet for review, then taken to a private area to review consent after expressing interest.  Patient met inclusion and exclusion criteria.  Consent was reviewed and patient verbalized summary of the study and ruano factors in participation.  He signed consent and The LeAAPS Team was notified.  Plan for surgery tomorrow. He is aware of points of contact and has research contact number in his phone.

## 2023-12-07 ENCOUNTER — ANESTHESIA EVENT (OUTPATIENT)
Dept: OPERATING ROOM | Facility: HOSPITAL | Age: 58
End: 2023-12-07
Payer: COMMERCIAL

## 2023-12-07 ENCOUNTER — ANESTHESIA (OUTPATIENT)
Dept: OPERATING ROOM | Facility: HOSPITAL | Age: 58
End: 2023-12-07
Payer: COMMERCIAL

## 2023-12-07 ENCOUNTER — APPOINTMENT (OUTPATIENT)
Dept: CARDIOLOGY | Facility: HOSPITAL | Age: 58
End: 2023-12-07
Payer: COMMERCIAL

## 2023-12-07 ENCOUNTER — APPOINTMENT (OUTPATIENT)
Dept: RADIOLOGY | Facility: HOSPITAL | Age: 58
End: 2023-12-07
Payer: COMMERCIAL

## 2023-12-07 ENCOUNTER — HOSPITAL ENCOUNTER (OUTPATIENT)
Dept: OPERATING ROOM | Facility: HOSPITAL | Age: 58
Discharge: HOME | End: 2023-12-07
Payer: COMMERCIAL

## 2023-12-07 ENCOUNTER — HOSPITAL ENCOUNTER (INPATIENT)
Facility: HOSPITAL | Age: 58
LOS: 4 days | Discharge: HOME | End: 2023-12-11
Attending: THORACIC SURGERY (CARDIOTHORACIC VASCULAR SURGERY) | Admitting: THORACIC SURGERY (CARDIOTHORACIC VASCULAR SURGERY)
Payer: COMMERCIAL

## 2023-12-07 DIAGNOSIS — I63.9 CEREBROVASCULAR ACCIDENT (CVA), UNSPECIFIED MECHANISM (MULTI): ICD-10-CM

## 2023-12-07 DIAGNOSIS — E11.59 TYPE 2 DIABETES MELLITUS WITH CARDIAC COMPLICATION (MULTI): ICD-10-CM

## 2023-12-07 DIAGNOSIS — I25.10 ATHEROSCLEROTIC HEART DISEASE OF NATIVE CORONARY ARTERY WITHOUT ANGINA PECTORIS: ICD-10-CM

## 2023-12-07 DIAGNOSIS — G47.33 OSA (OBSTRUCTIVE SLEEP APNEA): ICD-10-CM

## 2023-12-07 DIAGNOSIS — M54.10 RADICULAR PAIN: ICD-10-CM

## 2023-12-07 DIAGNOSIS — E07.9 THYROID DISEASE: ICD-10-CM

## 2023-12-07 DIAGNOSIS — E03.9 ACQUIRED HYPOTHYROIDISM: ICD-10-CM

## 2023-12-07 DIAGNOSIS — R73.9 HYPERGLYCEMIA: ICD-10-CM

## 2023-12-07 DIAGNOSIS — I25.10 CORONARY ARTERY DISEASE INVOLVING NATIVE CORONARY ARTERY OF NATIVE HEART WITHOUT ANGINA PECTORIS: ICD-10-CM

## 2023-12-07 DIAGNOSIS — R09.89 BILATERAL CAROTID BRUITS: ICD-10-CM

## 2023-12-07 DIAGNOSIS — R00.2 PALPITATIONS: ICD-10-CM

## 2023-12-07 DIAGNOSIS — I25.2 HISTORY OF ACUTE ANTERIOR WALL MI: ICD-10-CM

## 2023-12-07 DIAGNOSIS — I25.110 CORONARY ARTERY DISEASE INVOLVING NATIVE CORONARY ARTERY OF NATIVE HEART WITH UNSTABLE ANGINA PECTORIS (MULTI): Primary | ICD-10-CM

## 2023-12-07 DIAGNOSIS — R07.9 CHEST PAIN, UNSPECIFIED TYPE: ICD-10-CM

## 2023-12-07 DIAGNOSIS — N52.01 ERECTILE DYSFUNCTION DUE TO ARTERIAL INSUFFICIENCY: ICD-10-CM

## 2023-12-07 DIAGNOSIS — Z82.49 FAMILY HISTORY OF ISCHEMIC HEART DISEASE: ICD-10-CM

## 2023-12-07 DIAGNOSIS — I10 PRIMARY HYPERTENSION: ICD-10-CM

## 2023-12-07 DIAGNOSIS — M12.812 ROTATOR CUFF ARTHROPATHY, LEFT: ICD-10-CM

## 2023-12-07 DIAGNOSIS — Z72.0 TOBACCO ABUSE: ICD-10-CM

## 2023-12-07 DIAGNOSIS — M79.602 PAIN OF LEFT UPPER EXTREMITY: ICD-10-CM

## 2023-12-07 DIAGNOSIS — R06.83 SNORES: ICD-10-CM

## 2023-12-07 DIAGNOSIS — E78.2 MIXED HYPERLIPIDEMIA: ICD-10-CM

## 2023-12-07 DIAGNOSIS — R06.09 DOE (DYSPNEA ON EXERTION): ICD-10-CM

## 2023-12-07 DIAGNOSIS — R94.31 ABNORMAL EKG: ICD-10-CM

## 2023-12-07 LAB
ABO GROUP (TYPE) IN BLOOD: NORMAL
ACT BLD: 117 SEC (ref 96–152)
ACT BLD: 130 SEC (ref 96–152)
ACT BLD: 486 SEC (ref 96–152)
ACT BLD: 686 SEC (ref 96–152)
ACT BLD: 772 SEC (ref 96–152)
ALBUMIN SERPL BCP-MCNC: 3.5 G/DL (ref 3.4–5)
ANION GAP BLDA CALCULATED.4IONS-SCNC: 10 MMO/L (ref 10–25)
ANION GAP BLDA CALCULATED.4IONS-SCNC: 10 MMO/L (ref 10–25)
ANION GAP BLDA CALCULATED.4IONS-SCNC: 8 MMO/L (ref 10–25)
ANION GAP BLDA CALCULATED.4IONS-SCNC: 9 MMO/L (ref 10–25)
ANION GAP BLDV CALCULATED.4IONS-SCNC: 8 MMOL/L (ref 10–25)
ANION GAP BLDV CALCULATED.4IONS-SCNC: 9 MMOL/L (ref 10–25)
ANION GAP SERPL CALC-SCNC: 8 MMOL/L (ref 10–20)
APTT PPP: 31 SECONDS (ref 27–38)
ATRIAL RATE: 80 BPM
ATRIAL RATE: 83 BPM
BASE EXCESS BLDA CALC-SCNC: -0.8 MMOL/L (ref -2–3)
BASE EXCESS BLDA CALC-SCNC: -0.8 MMOL/L (ref -2–3)
BASE EXCESS BLDA CALC-SCNC: -1.4 MMOL/L (ref -2–3)
BASE EXCESS BLDA CALC-SCNC: -1.8 MMOL/L (ref -2–3)
BASE EXCESS BLDA CALC-SCNC: 0.1 MMOL/L (ref -2–3)
BASE EXCESS BLDA CALC-SCNC: 2.2 MMOL/L (ref -2–3)
BASE EXCESS BLDV CALC-SCNC: 0.4 MMOL/L (ref -2–3)
BASE EXCESS BLDV CALC-SCNC: 0.5 MMOL/L (ref -2–3)
BODY TEMPERATURE: ABNORMAL
BUN SERPL-MCNC: 9 MG/DL (ref 6–23)
CA-I BLDA-SCNC: 1.1 MMOL/L (ref 1.1–1.33)
CA-I BLDA-SCNC: 1.13 MMOL/L (ref 1.1–1.33)
CA-I BLDA-SCNC: 1.16 MMOL/L (ref 1.1–1.33)
CA-I BLDA-SCNC: 1.17 MMOL/L (ref 1.1–1.33)
CA-I BLDA-SCNC: 1.19 MMOL/L (ref 1.1–1.33)
CA-I BLDA-SCNC: 1.23 MMOL/L (ref 1.1–1.33)
CA-I BLDV-SCNC: 1.19 MMOL/L (ref 1.1–1.33)
CA-I BLDV-SCNC: 1.22 MMOL/L (ref 1.1–1.33)
CALCIUM SERPL-MCNC: 8.2 MG/DL (ref 8.6–10.3)
CHLORIDE BLDA-SCNC: 103 MMOL/L (ref 98–107)
CHLORIDE BLDA-SCNC: 104 MMOL/L (ref 98–107)
CHLORIDE BLDA-SCNC: 105 MMOL/L (ref 98–107)
CHLORIDE BLDA-SCNC: 106 MMOL/L (ref 98–107)
CHLORIDE BLDV-SCNC: 103 MMOL/L (ref 98–107)
CHLORIDE BLDV-SCNC: 103 MMOL/L (ref 98–107)
CHLORIDE SERPL-SCNC: 108 MMOL/L (ref 98–107)
CO2 SERPL-SCNC: 26 MMOL/L (ref 21–32)
CREAT SERPL-MCNC: 0.91 MG/DL (ref 0.5–1.3)
ERYTHROCYTE [DISTWIDTH] IN BLOOD BY AUTOMATED COUNT: 13.8 % (ref 11.5–14.5)
FIBRINOGEN PPP-MCNC: 234 MG/DL (ref 200–400)
GFR SERPL CREATININE-BSD FRML MDRD: >90 ML/MIN/1.73M*2
GLUCOSE BLD MANUAL STRIP-MCNC: 111 MG/DL (ref 74–99)
GLUCOSE BLD MANUAL STRIP-MCNC: 118 MG/DL (ref 74–99)
GLUCOSE BLD MANUAL STRIP-MCNC: 122 MG/DL (ref 74–99)
GLUCOSE BLD MANUAL STRIP-MCNC: 124 MG/DL (ref 74–99)
GLUCOSE BLD MANUAL STRIP-MCNC: 137 MG/DL (ref 74–99)
GLUCOSE BLDA-MCNC: 119 MG/DL (ref 74–99)
GLUCOSE BLDA-MCNC: 121 MG/DL (ref 74–99)
GLUCOSE BLDA-MCNC: 142 MG/DL (ref 74–99)
GLUCOSE BLDA-MCNC: 147 MG/DL (ref 74–99)
GLUCOSE BLDA-MCNC: 153 MG/DL (ref 74–99)
GLUCOSE BLDA-MCNC: 157 MG/DL (ref 74–99)
GLUCOSE BLDV-MCNC: 149 MG/DL (ref 74–99)
GLUCOSE BLDV-MCNC: 159 MG/DL (ref 74–99)
GLUCOSE SERPL-MCNC: 119 MG/DL (ref 74–99)
HCO3 BLDA-SCNC: 24.7 MMOL/L (ref 22–26)
HCO3 BLDA-SCNC: 24.8 MMOL/L (ref 22–26)
HCO3 BLDA-SCNC: 24.9 MMOL/L (ref 22–26)
HCO3 BLDA-SCNC: 26.6 MMOL/L (ref 22–26)
HCO3 BLDA-SCNC: 27.3 MMOL/L (ref 22–26)
HCO3 BLDA-SCNC: 28.3 MMOL/L (ref 22–26)
HCO3 BLDV-SCNC: 27.4 MMOL/L (ref 22–26)
HCO3 BLDV-SCNC: 28.9 MMOL/L (ref 22–26)
HCT VFR BLD AUTO: 38.4 % (ref 41–52)
HCT VFR BLD EST: 35 % (ref 41–52)
HCT VFR BLD EST: 37 % (ref 41–52)
HCT VFR BLD EST: 41 % (ref 41–52)
HCT VFR BLD EST: 41 % (ref 41–52)
HCT VFR BLD EST: 42 % (ref 41–52)
HCT VFR BLD EST: 43 % (ref 41–52)
HGB BLD-MCNC: 12.8 G/DL (ref 13.5–17.5)
HGB BLDA-MCNC: 11.6 G/DL (ref 13.5–17.5)
HGB BLDA-MCNC: 11.7 G/DL (ref 13.5–17.5)
HGB BLDA-MCNC: 12.4 G/DL (ref 13.5–17.5)
HGB BLDA-MCNC: 13.5 G/DL (ref 13.5–17.5)
HGB BLDA-MCNC: 13.9 G/DL (ref 13.5–17.5)
HGB BLDA-MCNC: 14.3 G/DL (ref 13.5–17.5)
HGB BLDV-MCNC: 11.6 G/DL (ref 13.5–17.5)
HGB BLDV-MCNC: 13.8 G/DL (ref 13.5–17.5)
INHALED O2 CONCENTRATION: 100 %
INHALED O2 CONCENTRATION: 60 %
INHALED O2 CONCENTRATION: 70 %
INHALED O2 CONCENTRATION: 70 %
INHALED O2 CONCENTRATION: 80 %
INR PPP: 1.2 (ref 0.9–1.1)
LACTATE BLDA-SCNC: 1.1 MMOL/L (ref 0.4–2)
LACTATE BLDA-SCNC: 1.8 MMOL/L (ref 0.4–2)
LACTATE BLDA-SCNC: 1.8 MMOL/L (ref 0.4–2)
LACTATE BLDA-SCNC: 1.9 MMOL/L (ref 0.4–2)
LACTATE BLDA-SCNC: 2 MMOL/L (ref 0.4–2)
LACTATE BLDA-SCNC: 2.2 MMOL/L (ref 0.4–2)
LACTATE BLDV-SCNC: 1.5 MMOL/L (ref 0.4–2)
LACTATE BLDV-SCNC: 1.9 MMOL/L (ref 0.4–2)
MAGNESIUM SERPL-MCNC: 2.59 MG/DL (ref 1.6–2.4)
MCH RBC QN AUTO: 30.3 PG (ref 26–34)
MCHC RBC AUTO-ENTMCNC: 33.3 G/DL (ref 32–36)
MCV RBC AUTO: 91 FL (ref 80–100)
NRBC BLD-RTO: 0 /100 WBCS (ref 0–0)
OXYHGB MFR BLDA: 92.5 % (ref 94–98)
OXYHGB MFR BLDA: 94.9 % (ref 94–98)
OXYHGB MFR BLDA: 95.6 % (ref 94–98)
OXYHGB MFR BLDA: 96.1 % (ref 94–98)
OXYHGB MFR BLDA: 96.1 % (ref 94–98)
OXYHGB MFR BLDA: 96.4 % (ref 94–98)
OXYHGB MFR BLDV: 85.7 % (ref 45–75)
OXYHGB MFR BLDV: 88.9 % (ref 45–75)
P AXIS: 42 DEGREES
P AXIS: 47 DEGREES
P OFFSET: 208 MS
P OFFSET: 213 MS
P ONSET: 153 MS
P ONSET: 158 MS
PCO2 BLDA: 43 MM HG (ref 38–42)
PCO2 BLDA: 44 MM HG (ref 38–42)
PCO2 BLDA: 47 MM HG (ref 38–42)
PCO2 BLDA: 48 MM HG (ref 38–42)
PCO2 BLDA: 54 MM HG (ref 38–42)
PCO2 BLDA: 63 MM HG (ref 38–42)
PCO2 BLDV: 52 MM HG (ref 41–51)
PCO2 BLDV: 66 MM HG (ref 41–51)
PEEP CMH2O: 5 CM H2O
PH BLDA: 7.26 PH (ref 7.38–7.42)
PH BLDA: 7.3 PH (ref 7.38–7.42)
PH BLDA: 7.32 PH (ref 7.38–7.42)
PH BLDA: 7.33 PH (ref 7.38–7.42)
PH BLDA: 7.36 PH (ref 7.38–7.42)
PH BLDA: 7.41 PH (ref 7.38–7.42)
PH BLDV: 7.25 PH (ref 7.33–7.43)
PH BLDV: 7.33 PH (ref 7.33–7.43)
PHOSPHATE SERPL-MCNC: 3.2 MG/DL (ref 2.5–4.9)
PLATELET # BLD AUTO: 155 X10*3/UL (ref 150–450)
PO2 BLDA: 146 MM HG (ref 85–95)
PO2 BLDA: 187 MM HG (ref 85–95)
PO2 BLDA: 241 MM HG (ref 85–95)
PO2 BLDA: 257 MM HG (ref 85–95)
PO2 BLDA: 408 MM HG (ref 85–95)
PO2 BLDA: 76 MM HG (ref 85–95)
PO2 BLDV: 61 MM HG (ref 35–45)
PO2 BLDV: 67 MM HG (ref 35–45)
POTASSIUM BLDA-SCNC: 4 MMOL/L (ref 3.5–5.3)
POTASSIUM BLDA-SCNC: 4.6 MMOL/L (ref 3.5–5.3)
POTASSIUM BLDA-SCNC: 4.7 MMOL/L (ref 3.5–5.3)
POTASSIUM BLDA-SCNC: 4.9 MMOL/L (ref 3.5–5.3)
POTASSIUM BLDA-SCNC: 5.8 MMOL/L (ref 3.5–5.3)
POTASSIUM BLDA-SCNC: 6.2 MMOL/L (ref 3.5–5.3)
POTASSIUM BLDV-SCNC: 4 MMOL/L (ref 3.5–5.3)
POTASSIUM BLDV-SCNC: 6 MMOL/L (ref 3.5–5.3)
POTASSIUM SERPL-SCNC: 4.3 MMOL/L (ref 3.5–5.3)
PR INTERVAL: 140 MS
PR INTERVAL: 148 MS
PROTHROMBIN TIME: 13.9 SECONDS (ref 9.8–12.8)
Q ONSET: 227 MS
Q ONSET: 228 MS
QRS COUNT: 13 BEATS
QRS COUNT: 14 BEATS
QRS DURATION: 86 MS
QRS DURATION: 90 MS
QT INTERVAL: 358 MS
QT INTERVAL: 404 MS
QTC CALCULATION(BAZETT): 420 MS
QTC CALCULATION(BAZETT): 465 MS
QTC FREDERICIA: 399 MS
QTC FREDERICIA: 445 MS
R AXIS: -1 DEGREES
R AXIS: -22 DEGREES
RBC # BLD AUTO: 4.22 X10*6/UL (ref 4.5–5.9)
RH FACTOR (ANTIGEN D): NORMAL
SAO2 % BLDA: 100 % (ref 94–100)
SAO2 % BLDA: 95 % (ref 94–100)
SAO2 % BLDA: 99 % (ref 94–100)
SAO2 % BLDV: 89 % (ref 45–75)
SAO2 % BLDV: 92 % (ref 45–75)
SODIUM BLDA-SCNC: 133 MMOL/L (ref 136–145)
SODIUM BLDA-SCNC: 134 MMOL/L (ref 136–145)
SODIUM BLDA-SCNC: 135 MMOL/L (ref 136–145)
SODIUM BLDA-SCNC: 135 MMOL/L (ref 136–145)
SODIUM BLDV-SCNC: 134 MMOL/L (ref 136–145)
SODIUM BLDV-SCNC: 135 MMOL/L (ref 136–145)
SODIUM SERPL-SCNC: 138 MMOL/L (ref 136–145)
SPECIMEN DRAWN FROM PATIENT: ABNORMAL
T AXIS: 25 DEGREES
T AXIS: 36 DEGREES
T OFFSET: 407 MS
T OFFSET: 429 MS
TIDAL VOLUME: 500 ML
VENTILATOR MODE: ABNORMAL
VENTILATOR RATE: 16 BPM
VENTRICULAR RATE: 80 BPM
VENTRICULAR RATE: 83 BPM
WBC # BLD AUTO: 13.9 X10*3/UL (ref 4.4–11.3)

## 2023-12-07 PROCEDURE — 71045 X-RAY EXAM CHEST 1 VIEW: CPT

## 2023-12-07 PROCEDURE — P9047 ALBUMIN (HUMAN), 25%, 50ML: HCPCS | Mod: JZ | Performed by: THORACIC SURGERY (CARDIOTHORACIC VASCULAR SURGERY)

## 2023-12-07 PROCEDURE — 85347 COAGULATION TIME ACTIVATED: CPT

## 2023-12-07 PROCEDURE — 37799 UNLISTED PX VASCULAR SURGERY: CPT

## 2023-12-07 PROCEDURE — 94002 VENT MGMT INPAT INIT DAY: CPT

## 2023-12-07 PROCEDURE — P9045 ALBUMIN (HUMAN), 5%, 250 ML: HCPCS | Mod: JZ

## 2023-12-07 PROCEDURE — C9113 INJ PANTOPRAZOLE SODIUM, VIA: HCPCS

## 2023-12-07 PROCEDURE — 3600000012 HC PERFUSION TIME - EACH INCREMENTAL 1 MINUTE: Performed by: THORACIC SURGERY (CARDIOTHORACIC VASCULAR SURGERY)

## 2023-12-07 PROCEDURE — 2500000002 HC RX 250 W HCPCS SELF ADMINISTERED DRUGS (ALT 637 FOR MEDICARE OP, ALT 636 FOR OP/ED)

## 2023-12-07 PROCEDURE — 36415 COLL VENOUS BLD VENIPUNCTURE: CPT | Performed by: THORACIC SURGERY (CARDIOTHORACIC VASCULAR SURGERY)

## 2023-12-07 PROCEDURE — 94640 AIRWAY INHALATION TREATMENT: CPT

## 2023-12-07 PROCEDURE — 85027 COMPLETE CBC AUTOMATED: CPT

## 2023-12-07 PROCEDURE — 84295 ASSAY OF SERUM SODIUM: CPT

## 2023-12-07 PROCEDURE — 99291 CRITICAL CARE FIRST HOUR: CPT

## 2023-12-07 PROCEDURE — 2500000004 HC RX 250 GENERAL PHARMACY W/ HCPCS (ALT 636 FOR OP/ED): Performed by: THORACIC SURGERY (CARDIOTHORACIC VASCULAR SURGERY)

## 2023-12-07 PROCEDURE — 82947 ASSAY GLUCOSE BLOOD QUANT: CPT

## 2023-12-07 PROCEDURE — 33508 ENDOSCOPIC VEIN HARVEST: CPT | Performed by: THORACIC SURGERY (CARDIOTHORACIC VASCULAR SURGERY)

## 2023-12-07 PROCEDURE — 83735 ASSAY OF MAGNESIUM: CPT

## 2023-12-07 PROCEDURE — A4217 STERILE WATER/SALINE, 500 ML: HCPCS | Performed by: THORACIC SURGERY (CARDIOTHORACIC VASCULAR SURGERY)

## 2023-12-07 PROCEDURE — 06BP4ZZ EXCISION OF RIGHT SAPHENOUS VEIN, PERCUTANEOUS ENDOSCOPIC APPROACH: ICD-10-PCS | Performed by: THORACIC SURGERY (CARDIOTHORACIC VASCULAR SURGERY)

## 2023-12-07 PROCEDURE — 85384 FIBRINOGEN ACTIVITY: CPT

## 2023-12-07 PROCEDURE — 96373 THER/PROPH/DIAG INJ IA: CPT | Performed by: THORACIC SURGERY (CARDIOTHORACIC VASCULAR SURGERY)

## 2023-12-07 PROCEDURE — 2500000001 HC RX 250 WO HCPCS SELF ADMINISTERED DRUGS (ALT 637 FOR MEDICARE OP)

## 2023-12-07 PROCEDURE — 03BC3ZZ EXCISION OF LEFT RADIAL ARTERY, PERCUTANEOUS APPROACH: ICD-10-PCS | Performed by: THORACIC SURGERY (CARDIOTHORACIC VASCULAR SURGERY)

## 2023-12-07 PROCEDURE — A4649 SURGICAL SUPPLIES: HCPCS | Performed by: THORACIC SURGERY (CARDIOTHORACIC VASCULAR SURGERY)

## 2023-12-07 PROCEDURE — 85610 PROTHROMBIN TIME: CPT

## 2023-12-07 PROCEDURE — 3600000018 HC OR TIME - INITIAL BASE CHARGE - PROCEDURE LEVEL SIX: Performed by: THORACIC SURGERY (CARDIOTHORACIC VASCULAR SURGERY)

## 2023-12-07 PROCEDURE — 2500000005 HC RX 250 GENERAL PHARMACY W/O HCPCS

## 2023-12-07 PROCEDURE — 3700000002 HC GENERAL ANESTHESIA TIME - EACH INCREMENTAL 1 MINUTE: Performed by: THORACIC SURGERY (CARDIOTHORACIC VASCULAR SURGERY)

## 2023-12-07 PROCEDURE — 3600000017 HC OR TIME - EACH INCREMENTAL 1 MINUTE - PROCEDURE LEVEL SIX: Performed by: THORACIC SURGERY (CARDIOTHORACIC VASCULAR SURGERY)

## 2023-12-07 PROCEDURE — 5A09457 ASSISTANCE WITH RESPIRATORY VENTILATION, 24-96 CONSECUTIVE HOURS, CONTINUOUS POSITIVE AIRWAY PRESSURE: ICD-10-PCS | Performed by: EMERGENCY MEDICINE

## 2023-12-07 PROCEDURE — 94762 N-INVAS EAR/PLS OXIMTRY CONT: CPT

## 2023-12-07 PROCEDURE — 33517 CABG ARTERY-VEIN SINGLE: CPT | Performed by: PHYSICIAN ASSISTANT

## 2023-12-07 PROCEDURE — 80069 RENAL FUNCTION PANEL: CPT

## 2023-12-07 PROCEDURE — 84132 ASSAY OF SERUM POTASSIUM: CPT

## 2023-12-07 PROCEDURE — 2500000005 HC RX 250 GENERAL PHARMACY W/O HCPCS: Performed by: THORACIC SURGERY (CARDIOTHORACIC VASCULAR SURGERY)

## 2023-12-07 PROCEDURE — 2500000004 HC RX 250 GENERAL PHARMACY W/ HCPCS (ALT 636 FOR OP/ED): Mod: JZ

## 2023-12-07 PROCEDURE — 93005 ELECTROCARDIOGRAM TRACING: CPT

## 2023-12-07 PROCEDURE — 2780000003 HC OR 278 NO HCPCS: Performed by: THORACIC SURGERY (CARDIOTHORACIC VASCULAR SURGERY)

## 2023-12-07 PROCEDURE — 5A1221Z PERFORMANCE OF CARDIAC OUTPUT, CONTINUOUS: ICD-10-PCS | Performed by: THORACIC SURGERY (CARDIOTHORACIC VASCULAR SURGERY)

## 2023-12-07 PROCEDURE — B245ZZ4 ULTRASONOGRAPHY OF LEFT HEART, TRANSESOPHAGEAL: ICD-10-PCS | Performed by: THORACIC SURGERY (CARDIOTHORACIC VASCULAR SURGERY)

## 2023-12-07 PROCEDURE — 02100AW BYPASS CORONARY ARTERY, ONE ARTERY FROM AORTA WITH AUTOLOGOUS ARTERIAL TISSUE, OPEN APPROACH: ICD-10-PCS | Performed by: THORACIC SURGERY (CARDIOTHORACIC VASCULAR SURGERY)

## 2023-12-07 PROCEDURE — 3700000001 HC GENERAL ANESTHESIA TIME - INITIAL BASE CHARGE: Performed by: THORACIC SURGERY (CARDIOTHORACIC VASCULAR SURGERY)

## 2023-12-07 PROCEDURE — 33509 NDSC HRV UXTR ART 1 SGM CAB: CPT | Performed by: PHYSICIAN ASSISTANT

## 2023-12-07 PROCEDURE — 2500000004 HC RX 250 GENERAL PHARMACY W/ HCPCS (ALT 636 FOR OP/ED): Performed by: ANESTHESIOLOGY

## 2023-12-07 PROCEDURE — 2020000001 HC ICU ROOM DAILY

## 2023-12-07 PROCEDURE — 33509 NDSC HRV UXTR ART 1 SGM CAB: CPT | Performed by: THORACIC SURGERY (CARDIOTHORACIC VASCULAR SURGERY)

## 2023-12-07 PROCEDURE — 0210093 BYPASS CORONARY ARTERY, ONE ARTERY FROM CORONARY ARTERY WITH AUTOLOGOUS VENOUS TISSUE, OPEN APPROACH: ICD-10-PCS | Performed by: THORACIC SURGERY (CARDIOTHORACIC VASCULAR SURGERY)

## 2023-12-07 PROCEDURE — 33508 ENDOSCOPIC VEIN HARVEST: CPT | Performed by: PHYSICIAN ASSISTANT

## 2023-12-07 PROCEDURE — 2500000001 HC RX 250 WO HCPCS SELF ADMINISTERED DRUGS (ALT 637 FOR MEDICARE OP): Performed by: ANESTHESIOLOGY

## 2023-12-07 PROCEDURE — 2500000005 HC RX 250 GENERAL PHARMACY W/O HCPCS: Performed by: NURSE ANESTHETIST, CERTIFIED REGISTERED

## 2023-12-07 PROCEDURE — 33517 CABG ARTERY-VEIN SINGLE: CPT | Performed by: THORACIC SURGERY (CARDIOTHORACIC VASCULAR SURGERY)

## 2023-12-07 PROCEDURE — 2500000004 HC RX 250 GENERAL PHARMACY W/ HCPCS (ALT 636 FOR OP/ED): Performed by: NURSE ANESTHETIST, CERTIFIED REGISTERED

## 2023-12-07 PROCEDURE — 2500000004 HC RX 250 GENERAL PHARMACY W/ HCPCS (ALT 636 FOR OP/ED)

## 2023-12-07 PROCEDURE — 85730 THROMBOPLASTIN TIME PARTIAL: CPT

## 2023-12-07 PROCEDURE — 71045 X-RAY EXAM CHEST 1 VIEW: CPT | Performed by: RADIOLOGY

## 2023-12-07 PROCEDURE — 93010 ELECTROCARDIOGRAM REPORT: CPT | Performed by: INTERNAL MEDICINE

## 2023-12-07 PROCEDURE — 83605 ASSAY OF LACTIC ACID: CPT

## 2023-12-07 PROCEDURE — 82805 BLOOD GASES W/O2 SATURATION: CPT

## 2023-12-07 PROCEDURE — S0017 INJECTION, AMINOCAPROIC ACID: HCPCS | Performed by: NURSE ANESTHETIST, CERTIFIED REGISTERED

## 2023-12-07 PROCEDURE — 2720000007 HC OR 272 NO HCPCS: Performed by: THORACIC SURGERY (CARDIOTHORACIC VASCULAR SURGERY)

## 2023-12-07 PROCEDURE — 3600000011 HC PERFUSION TIME - INITIAL BASE CHARGE: Performed by: THORACIC SURGERY (CARDIOTHORACIC VASCULAR SURGERY)

## 2023-12-07 PROCEDURE — 33534 CABG ARTERIAL TWO: CPT | Performed by: THORACIC SURGERY (CARDIOTHORACIC VASCULAR SURGERY)

## 2023-12-07 PROCEDURE — 02100Z9 BYPASS CORONARY ARTERY, ONE ARTERY FROM LEFT INTERNAL MAMMARY, OPEN APPROACH: ICD-10-PCS | Performed by: THORACIC SURGERY (CARDIOTHORACIC VASCULAR SURGERY)

## 2023-12-07 PROCEDURE — 33534 CABG ARTERIAL TWO: CPT | Performed by: PHYSICIAN ASSISTANT

## 2023-12-07 RX ORDER — PROTAMINE SULFATE 10 MG/ML
INJECTION, SOLUTION INTRAVENOUS AS NEEDED
Status: DISCONTINUED | OUTPATIENT
Start: 2023-12-07 | End: 2023-12-07

## 2023-12-07 RX ORDER — ALBUMIN HUMAN 50 G/1000ML
SOLUTION INTRAVENOUS
Status: DISPENSED
Start: 2023-12-07 | End: 2023-12-08

## 2023-12-07 RX ORDER — NICARDIPINE HYDROCHLORIDE 0.2 MG/ML
2.5-15 INJECTION INTRAVENOUS CONTINUOUS PRN
Status: DISCONTINUED | OUTPATIENT
Start: 2023-12-07 | End: 2023-12-07

## 2023-12-07 RX ORDER — PANTOPRAZOLE SODIUM 40 MG/10ML
40 INJECTION, POWDER, LYOPHILIZED, FOR SOLUTION INTRAVENOUS
Status: DISCONTINUED | OUTPATIENT
Start: 2023-12-08 | End: 2023-12-07

## 2023-12-07 RX ORDER — PHENYLEPHRINE HCL IN 0.9% NACL 0.4MG/10ML
SYRINGE (ML) INTRAVENOUS AS NEEDED
Status: DISCONTINUED | OUTPATIENT
Start: 2023-12-07 | End: 2023-12-07

## 2023-12-07 RX ORDER — SODIUM CHLORIDE, SODIUM LACTATE, POTASSIUM CHLORIDE, CALCIUM CHLORIDE 600; 310; 30; 20 MG/100ML; MG/100ML; MG/100ML; MG/100ML
50 INJECTION, SOLUTION INTRAVENOUS CONTINUOUS
Status: DISCONTINUED | OUTPATIENT
Start: 2023-12-07 | End: 2023-12-08

## 2023-12-07 RX ORDER — NICARDIPINE HYDROCHLORIDE 0.2 MG/ML
2.5-15 INJECTION INTRAVENOUS CONTINUOUS
Status: DISCONTINUED | OUTPATIENT
Start: 2023-12-07 | End: 2023-12-08

## 2023-12-07 RX ORDER — SODIUM CHLORIDE, SODIUM LACTATE, POTASSIUM CHLORIDE, CALCIUM CHLORIDE 600; 310; 30; 20 MG/100ML; MG/100ML; MG/100ML; MG/100ML
INJECTION, SOLUTION INTRAVENOUS CONTINUOUS PRN
Status: DISCONTINUED | OUTPATIENT
Start: 2023-12-07 | End: 2023-12-07

## 2023-12-07 RX ORDER — OXYCODONE HYDROCHLORIDE 5 MG/1
5 TABLET ORAL EVERY 4 HOURS PRN
Status: DISCONTINUED | OUTPATIENT
Start: 2023-12-07 | End: 2023-12-10

## 2023-12-07 RX ORDER — CISATRACURIUM BESYLATE 2 MG/ML
INJECTION, SOLUTION INTRAVENOUS AS NEEDED
Status: DISCONTINUED | OUTPATIENT
Start: 2023-12-07 | End: 2023-12-07

## 2023-12-07 RX ORDER — POTASSIUM CHLORIDE 29.8 MG/ML
40 INJECTION INTRAVENOUS EVERY 6 HOURS PRN
Status: DISCONTINUED | OUTPATIENT
Start: 2023-12-07 | End: 2023-12-11 | Stop reason: HOSPADM

## 2023-12-07 RX ORDER — FENTANYL CITRATE 50 UG/ML
INJECTION, SOLUTION INTRAMUSCULAR; INTRAVENOUS AS NEEDED
Status: DISCONTINUED | OUTPATIENT
Start: 2023-12-07 | End: 2023-12-07

## 2023-12-07 RX ORDER — ALBUMIN HUMAN 50 G/1000ML
SOLUTION INTRAVENOUS
Status: DISCONTINUED
Start: 2023-12-07 | End: 2023-12-08 | Stop reason: HOSPADM

## 2023-12-07 RX ORDER — PANTOPRAZOLE SODIUM 40 MG/1
40 TABLET, DELAYED RELEASE ORAL
Status: DISCONTINUED | OUTPATIENT
Start: 2023-12-07 | End: 2023-12-11

## 2023-12-07 RX ORDER — INSULIN LISPRO 100 [IU]/ML
0-15 INJECTION, SOLUTION INTRAVENOUS; SUBCUTANEOUS EVERY 4 HOURS
Status: DISCONTINUED | OUTPATIENT
Start: 2023-12-07 | End: 2023-12-09

## 2023-12-07 RX ORDER — AMIODARONE HYDROCHLORIDE 150 MG/3ML
INJECTION, SOLUTION INTRAVENOUS AS NEEDED
Status: DISCONTINUED | OUTPATIENT
Start: 2023-12-07 | End: 2023-12-07

## 2023-12-07 RX ORDER — MIDAZOLAM HYDROCHLORIDE 1 MG/ML
INJECTION, SOLUTION INTRAMUSCULAR; INTRAVENOUS AS NEEDED
Status: DISCONTINUED | OUTPATIENT
Start: 2023-12-07 | End: 2023-12-07

## 2023-12-07 RX ORDER — PANTOPRAZOLE SODIUM 40 MG/1
40 TABLET, DELAYED RELEASE ORAL
Status: DISCONTINUED | OUTPATIENT
Start: 2023-12-08 | End: 2023-12-07

## 2023-12-07 RX ORDER — ASPIRIN 300 MG/1
150 SUPPOSITORY RECTAL ONCE
Status: COMPLETED | OUTPATIENT
Start: 2023-12-07 | End: 2023-12-07

## 2023-12-07 RX ORDER — POLYETHYLENE GLYCOL 3350 17 G/17G
17 POWDER, FOR SOLUTION ORAL DAILY
Status: DISCONTINUED | OUTPATIENT
Start: 2023-12-07 | End: 2023-12-11

## 2023-12-07 RX ORDER — LEVOTHYROXINE SODIUM 88 UG/1
88 TABLET ORAL DAILY
Status: DISCONTINUED | OUTPATIENT
Start: 2023-12-07 | End: 2023-12-11 | Stop reason: HOSPADM

## 2023-12-07 RX ORDER — POTASSIUM CHLORIDE 20 MEQ/1
20 TABLET, EXTENDED RELEASE ORAL EVERY 6 HOURS PRN
Status: DISCONTINUED | OUTPATIENT
Start: 2023-12-07 | End: 2023-12-11 | Stop reason: HOSPADM

## 2023-12-07 RX ORDER — MAGNESIUM SULFATE HEPTAHYDRATE 40 MG/ML
2 INJECTION, SOLUTION INTRAVENOUS EVERY 6 HOURS PRN
Status: DISCONTINUED | OUTPATIENT
Start: 2023-12-07 | End: 2023-12-11 | Stop reason: HOSPADM

## 2023-12-07 RX ORDER — NAPROXEN SODIUM 220 MG/1
81 TABLET, FILM COATED ORAL ONCE
Status: COMPLETED | OUTPATIENT
Start: 2023-12-07 | End: 2023-12-07

## 2023-12-07 RX ORDER — POTASSIUM CHLORIDE 20 MEQ/1
40 TABLET, EXTENDED RELEASE ORAL EVERY 6 HOURS PRN
Status: DISCONTINUED | OUTPATIENT
Start: 2023-12-07 | End: 2023-12-11 | Stop reason: HOSPADM

## 2023-12-07 RX ORDER — ETOMIDATE 2 MG/ML
INJECTION INTRAVENOUS AS NEEDED
Status: DISCONTINUED | OUTPATIENT
Start: 2023-12-07 | End: 2023-12-07

## 2023-12-07 RX ORDER — POTASSIUM CHLORIDE 1.5 G/1.58G
40 POWDER, FOR SOLUTION ORAL EVERY 6 HOURS PRN
Status: DISCONTINUED | OUTPATIENT
Start: 2023-12-07 | End: 2023-12-11 | Stop reason: HOSPADM

## 2023-12-07 RX ORDER — DEXTROSE 50 % IN WATER (D50W) INTRAVENOUS SYRINGE
25
Status: DISCONTINUED | OUTPATIENT
Start: 2023-12-07 | End: 2023-12-07

## 2023-12-07 RX ORDER — SODIUM CHLORIDE 0.9 G/100ML
IRRIGANT IRRIGATION AS NEEDED
Status: DISCONTINUED | OUTPATIENT
Start: 2023-12-07 | End: 2023-12-07 | Stop reason: HOSPADM

## 2023-12-07 RX ORDER — ALBUMIN HUMAN 50 G/1000ML
25 SOLUTION INTRAVENOUS DAILY PRN
Status: COMPLETED | OUTPATIENT
Start: 2023-12-07 | End: 2023-12-07

## 2023-12-07 RX ORDER — ACETAMINOPHEN 650 MG/1
650 SUPPOSITORY RECTAL EVERY 4 HOURS
Status: DISCONTINUED | OUTPATIENT
Start: 2023-12-07 | End: 2023-12-08

## 2023-12-07 RX ORDER — PAPAVERINE HYDROCHLORIDE 30 MG/ML
INJECTION INTRAMUSCULAR; INTRAVENOUS AS NEEDED
Status: DISCONTINUED | OUTPATIENT
Start: 2023-12-07 | End: 2023-12-07 | Stop reason: HOSPADM

## 2023-12-07 RX ORDER — DEXTROSE 50 % IN WATER (D50W) INTRAVENOUS SYRINGE
25
Status: DISCONTINUED | OUTPATIENT
Start: 2023-12-07 | End: 2023-12-11 | Stop reason: HOSPADM

## 2023-12-07 RX ORDER — NITROGLYCERIN 20 MG/100ML
INJECTION INTRAVENOUS CONTINUOUS PRN
Status: DISCONTINUED | OUTPATIENT
Start: 2023-12-07 | End: 2023-12-07

## 2023-12-07 RX ORDER — ACETAMINOPHEN 325 MG/1
650 TABLET ORAL EVERY 4 HOURS
Status: DISCONTINUED | OUTPATIENT
Start: 2023-12-07 | End: 2023-12-08

## 2023-12-07 RX ORDER — PROPOFOL 10 MG/ML
INJECTION, EMULSION INTRAVENOUS AS NEEDED
Status: DISCONTINUED | OUTPATIENT
Start: 2023-12-07 | End: 2023-12-07

## 2023-12-07 RX ORDER — ONDANSETRON HYDROCHLORIDE 2 MG/ML
4 INJECTION, SOLUTION INTRAVENOUS EVERY 8 HOURS PRN
Status: DISCONTINUED | OUTPATIENT
Start: 2023-12-07 | End: 2023-12-11 | Stop reason: HOSPADM

## 2023-12-07 RX ORDER — LIDOCAINE HYDROCHLORIDE 10 MG/ML
INJECTION INFILTRATION; PERINEURAL AS NEEDED
Status: DISCONTINUED | OUTPATIENT
Start: 2023-12-07 | End: 2023-12-07

## 2023-12-07 RX ORDER — SODIUM CHLORIDE, SODIUM LACTATE, POTASSIUM CHLORIDE, CALCIUM CHLORIDE 600; 310; 30; 20 MG/100ML; MG/100ML; MG/100ML; MG/100ML
5 INJECTION, SOLUTION INTRAVENOUS CONTINUOUS
Status: DISCONTINUED | OUTPATIENT
Start: 2023-12-07 | End: 2023-12-07

## 2023-12-07 RX ORDER — PANTOPRAZOLE SODIUM 40 MG/10ML
40 INJECTION, POWDER, LYOPHILIZED, FOR SOLUTION INTRAVENOUS
Status: DISCONTINUED | OUTPATIENT
Start: 2023-12-07 | End: 2023-12-10

## 2023-12-07 RX ORDER — METOPROLOL TARTRATE 50 MG/1
50 TABLET ORAL 2 TIMES DAILY
Status: DISCONTINUED | OUTPATIENT
Start: 2023-12-07 | End: 2023-12-07 | Stop reason: HOSPADM

## 2023-12-07 RX ORDER — IPRATROPIUM BROMIDE AND ALBUTEROL SULFATE 2.5; .5 MG/3ML; MG/3ML
3 SOLUTION RESPIRATORY (INHALATION) 3 TIMES DAILY
Status: DISCONTINUED | OUTPATIENT
Start: 2023-12-07 | End: 2023-12-11

## 2023-12-07 RX ORDER — HEPARIN SODIUM 1000 [USP'U]/ML
40000 INJECTION, SOLUTION INTRAVENOUS; SUBCUTANEOUS ONCE
Status: COMPLETED | OUTPATIENT
Start: 2023-12-07 | End: 2023-12-07

## 2023-12-07 RX ORDER — PROPOFOL 10 MG/ML
INJECTION, EMULSION INTRAVENOUS CONTINUOUS PRN
Status: DISCONTINUED | OUTPATIENT
Start: 2023-12-07 | End: 2023-12-07

## 2023-12-07 RX ORDER — ATORVASTATIN CALCIUM 80 MG/1
80 TABLET, FILM COATED ORAL NIGHTLY
Status: DISCONTINUED | OUTPATIENT
Start: 2023-12-07 | End: 2023-12-11 | Stop reason: HOSPADM

## 2023-12-07 RX ORDER — POTASSIUM CHLORIDE 14.9 MG/ML
20 INJECTION INTRAVENOUS EVERY 6 HOURS PRN
Status: DISCONTINUED | OUTPATIENT
Start: 2023-12-07 | End: 2023-12-11 | Stop reason: HOSPADM

## 2023-12-07 RX ORDER — AMINOCAPROIC ACID 250 MG/ML
INJECTION, SOLUTION INTRAVENOUS CONTINUOUS PRN
Status: DISCONTINUED | OUTPATIENT
Start: 2023-12-07 | End: 2023-12-07

## 2023-12-07 RX ORDER — GLYCERIN 1 G/1
1 SUPPOSITORY RECTAL DAILY PRN
Status: DISCONTINUED | OUTPATIENT
Start: 2023-12-07 | End: 2023-12-07

## 2023-12-07 RX ORDER — NOREPINEPHRINE BITARTRATE/D5W 8 MG/250ML
0-1 PLASTIC BAG, INJECTION (ML) INTRAVENOUS CONTINUOUS
Status: DISCONTINUED | OUTPATIENT
Start: 2023-12-07 | End: 2023-12-07

## 2023-12-07 RX ORDER — FLUTICASONE PROPIONATE AND SALMETEROL XINAFOATE 45; 21 UG/1; UG/1
2 AEROSOL, METERED RESPIRATORY (INHALATION)
Status: DISCONTINUED | OUTPATIENT
Start: 2023-12-07 | End: 2023-12-08

## 2023-12-07 RX ORDER — MAGNESIUM SULFATE HEPTAHYDRATE 40 MG/ML
4 INJECTION, SOLUTION INTRAVENOUS EVERY 6 HOURS PRN
Status: DISCONTINUED | OUTPATIENT
Start: 2023-12-07 | End: 2023-12-11 | Stop reason: HOSPADM

## 2023-12-07 RX ORDER — VANCOMYCIN HYDROCHLORIDE 1 G/20ML
INJECTION, POWDER, LYOPHILIZED, FOR SOLUTION INTRAVENOUS AS NEEDED
Status: DISCONTINUED | OUTPATIENT
Start: 2023-12-07 | End: 2023-12-07 | Stop reason: HOSPADM

## 2023-12-07 RX ORDER — NOREPINEPHRINE BITARTRATE 0.03 MG/ML
INJECTION, SOLUTION INTRAVENOUS CONTINUOUS PRN
Status: DISCONTINUED | OUTPATIENT
Start: 2023-12-07 | End: 2023-12-07

## 2023-12-07 RX ORDER — PROPOFOL 10 MG/ML
0-50 INJECTION, EMULSION INTRAVENOUS CONTINUOUS
Status: DISCONTINUED | OUTPATIENT
Start: 2023-12-07 | End: 2023-12-07

## 2023-12-07 RX ORDER — ONDANSETRON 4 MG/1
4 TABLET, FILM COATED ORAL EVERY 8 HOURS PRN
Status: DISCONTINUED | OUTPATIENT
Start: 2023-12-07 | End: 2023-12-11 | Stop reason: HOSPADM

## 2023-12-07 RX ORDER — BISACODYL 5 MG
10 TABLET, DELAYED RELEASE (ENTERIC COATED) ORAL DAILY PRN
Status: DISCONTINUED | OUTPATIENT
Start: 2023-12-07 | End: 2023-12-11 | Stop reason: HOSPADM

## 2023-12-07 RX ORDER — POTASSIUM CHLORIDE 1.5 G/1.58G
20 POWDER, FOR SOLUTION ORAL EVERY 6 HOURS PRN
Status: DISCONTINUED | OUTPATIENT
Start: 2023-12-07 | End: 2023-12-11 | Stop reason: HOSPADM

## 2023-12-07 RX ORDER — NALOXONE HYDROCHLORIDE 1 MG/ML
0.2 INJECTION INTRAMUSCULAR; INTRAVENOUS; SUBCUTANEOUS EVERY 5 MIN PRN
Status: DISCONTINUED | OUTPATIENT
Start: 2023-12-07 | End: 2023-12-10

## 2023-12-07 RX ADMIN — SODIUM CHLORIDE, SODIUM LACTATE, POTASSIUM CHLORIDE, CALCIUM CHLORIDE: 600; 310; 30; 20 INJECTION, SOLUTION INTRAVENOUS at 08:36

## 2023-12-07 RX ADMIN — PROPOFOL 50 MCG/KG/MIN: 10 INJECTION, EMULSION INTRAVENOUS at 10:48

## 2023-12-07 RX ADMIN — CISATRACURIUM BESYLATE 4000 MCG: 2 INJECTION, SOLUTION INTRAVENOUS at 10:37

## 2023-12-07 RX ADMIN — NOREPINEPHRINE BITARTRATE 0.01 MCG/KG/MIN: 8 INJECTION, SOLUTION INTRAVENOUS at 14:17

## 2023-12-07 RX ADMIN — FENTANYL CITRATE 200 MCG: 50 INJECTION, SOLUTION INTRAMUSCULAR; INTRAVENOUS at 09:51

## 2023-12-07 RX ADMIN — CISATRACURIUM BESYLATE 6000 MCG: 2 INJECTION, SOLUTION INTRAVENOUS at 12:35

## 2023-12-07 RX ADMIN — SODIUM CHLORIDE, POTASSIUM CHLORIDE, SODIUM LACTATE AND CALCIUM CHLORIDE 50 ML/HR: 600; 310; 30; 20 INJECTION, SOLUTION INTRAVENOUS at 13:55

## 2023-12-07 RX ADMIN — HYDROMORPHONE HYDROCHLORIDE 0.5 MG: 1 INJECTION, SOLUTION INTRAMUSCULAR; INTRAVENOUS; SUBCUTANEOUS at 15:33

## 2023-12-07 RX ADMIN — VANCOMYCIN HYDROCHLORIDE 1250 MG: 1.25 INJECTION, POWDER, LYOPHILIZED, FOR SOLUTION INTRAVENOUS at 19:58

## 2023-12-07 RX ADMIN — HEPARIN SODIUM 40000 UNITS: 1000 INJECTION INTRAVENOUS; SUBCUTANEOUS at 10:29

## 2023-12-07 RX ADMIN — NITROGLYCERIN 10 MCG/MIN: 20 INJECTION INTRAVENOUS at 09:54

## 2023-12-07 RX ADMIN — FENTANYL CITRATE 50 MCG: 50 INJECTION, SOLUTION INTRAMUSCULAR; INTRAVENOUS at 10:23

## 2023-12-07 RX ADMIN — PROPOFOL 50 MG: 10 INJECTION, EMULSION INTRAVENOUS at 09:54

## 2023-12-07 RX ADMIN — PROTAMINE SULFATE 520 MG: 10 INJECTION, SOLUTION INTRAVENOUS at 12:15

## 2023-12-07 RX ADMIN — CISATRACURIUM BESYLATE 6000 MCG: 2 INJECTION, SOLUTION INTRAVENOUS at 10:23

## 2023-12-07 RX ADMIN — SODIUM CHLORIDE, SODIUM LACTATE, POTASSIUM CHLORIDE, AND CALCIUM CHLORIDE: 600; 310; 30; 20 INJECTION, SOLUTION INTRAVENOUS at 08:36

## 2023-12-07 RX ADMIN — PROPOFOL 50 MG: 10 INJECTION, EMULSION INTRAVENOUS at 09:16

## 2023-12-07 RX ADMIN — NOREPINEPHRINE BITARTRATE 0.02 MCG/KG/MIN: 0.03 INJECTION, SOLUTION INTRAVENOUS at 11:10

## 2023-12-07 RX ADMIN — NICARDIPINE HYDROCHLORIDE 2.5 MG/HR: 0.2 INJECTION, SOLUTION INTRAVENOUS at 21:59

## 2023-12-07 RX ADMIN — FENTANYL CITRATE 100 MCG: 50 INJECTION, SOLUTION INTRAMUSCULAR; INTRAVENOUS at 09:16

## 2023-12-07 RX ADMIN — Medication 100 MCG: at 13:40

## 2023-12-07 RX ADMIN — AMIODARONE HYDROCHLORIDE 5000 MG: 50 INJECTION, SOLUTION INTRAVENOUS at 09:24

## 2023-12-07 RX ADMIN — ETOMIDATE 20 MG: 2 INJECTION INTRAVENOUS at 09:16

## 2023-12-07 RX ADMIN — IPRATROPIUM BROMIDE AND ALBUTEROL SULFATE 3 ML: 2.5; .5 SOLUTION RESPIRATORY (INHALATION) at 20:19

## 2023-12-07 RX ADMIN — ALBUMIN HUMAN 12.5 G: 0.05 INJECTION, SOLUTION INTRAVENOUS at 14:58

## 2023-12-07 RX ADMIN — Medication 100 MCG: at 13:39

## 2023-12-07 RX ADMIN — SODIUM CHLORIDE: 9 INJECTION, SOLUTION INTRAVENOUS at 08:36

## 2023-12-07 RX ADMIN — CISATRACURIUM BESYLATE 586800 MCG: 2 INJECTION, SOLUTION INTRAVENOUS at 09:54

## 2023-12-07 RX ADMIN — ASPIRIN 150 MG: 300 SUPPOSITORY RECTAL at 14:29

## 2023-12-07 RX ADMIN — AMINOCAPROIC ACID 6.75 G/HR: 250 INJECTION, SOLUTION INTRAVENOUS at 09:54

## 2023-12-07 RX ADMIN — Medication 3 L/MIN: at 20:00

## 2023-12-07 RX ADMIN — MIDAZOLAM HYDROCHLORIDE 2 MG: 1 INJECTION, SOLUTION INTRAMUSCULAR; INTRAVENOUS at 09:00

## 2023-12-07 RX ADMIN — HEPARIN SODIUM 5000 UNITS: 1000 INJECTION INTRAVENOUS; SUBCUTANEOUS at 11:45

## 2023-12-07 RX ADMIN — OXYCODONE HYDROCHLORIDE 5 MG: 5 TABLET ORAL at 20:36

## 2023-12-07 RX ADMIN — PROPOFOL 20 MCG/KG/MIN: 10 INJECTION, EMULSION INTRAVENOUS at 14:17

## 2023-12-07 RX ADMIN — VANCOMYCIN HYDROCHLORIDE 1500 MG: 1.5 INJECTION, POWDER, LYOPHILIZED, FOR SOLUTION INTRAVENOUS at 08:09

## 2023-12-07 RX ADMIN — CISATRACURIUM BESYLATE 1400 MCG: 2 INJECTION, SOLUTION INTRAVENOUS at 09:18

## 2023-12-07 RX ADMIN — HYDROMORPHONE HYDROCHLORIDE 0.5 MG: 1 INJECTION, SOLUTION INTRAMUSCULAR; INTRAVENOUS; SUBCUTANEOUS at 22:45

## 2023-12-07 RX ADMIN — ALBUMIN (HUMAN): 12.5 INJECTION, SOLUTION INTRAVENOUS at 13:30

## 2023-12-07 RX ADMIN — HEPARIN SODIUM 560 ML: 1000 INJECTION INTRAVENOUS; SUBCUTANEOUS at 10:44

## 2023-12-07 RX ADMIN — PANTOPRAZOLE SODIUM 40 MG: 40 INJECTION, POWDER, FOR SOLUTION INTRAVENOUS at 15:22

## 2023-12-07 RX ADMIN — ACETAMINOPHEN 650 MG: 325 TABLET ORAL at 19:33

## 2023-12-07 RX ADMIN — LIDOCAINE HYDROCHLORIDE 100 MG: 10 INJECTION, SOLUTION INFILTRATION; PERINEURAL at 11:44

## 2023-12-07 RX ADMIN — METOPROLOL TARTRATE 50 MG: 25 TABLET, FILM COATED ORAL at 08:01

## 2023-12-07 RX ADMIN — HYDROMORPHONE HYDROCHLORIDE 0.5 MG: 1 INJECTION, SOLUTION INTRAMUSCULAR; INTRAVENOUS; SUBCUTANEOUS at 18:17

## 2023-12-07 RX ADMIN — SODIUM CHLORIDE, SODIUM LACTATE, POTASSIUM CHLORIDE, CALCIUM CHLORIDE: 600; 310; 30; 20 INJECTION, SOLUTION INTRAVENOUS at 12:45

## 2023-12-07 RX ADMIN — MIDAZOLAM HYDROCHLORIDE 3 MG: 1 INJECTION, SOLUTION INTRAMUSCULAR; INTRAVENOUS at 08:35

## 2023-12-07 RX ADMIN — PROPOFOL 50 MG: 10 INJECTION, EMULSION INTRAVENOUS at 09:19

## 2023-12-07 RX ADMIN — FENTANYL CITRATE 100 MCG: 50 INJECTION, SOLUTION INTRAMUSCULAR; INTRAVENOUS at 09:54

## 2023-12-07 SDOH — HEALTH STABILITY: MENTAL HEALTH: CURRENT SMOKER: 0

## 2023-12-07 ASSESSMENT — PAIN SCALES - GENERAL
PAINLEVEL_OUTOF10: 0 - NO PAIN
PAINLEVEL_OUTOF10: 10 - WORST POSSIBLE PAIN
PAINLEVEL_OUTOF10: 3
PAINLEVEL_OUTOF10: 9
PAIN_LEVEL: 0
PAINLEVEL_OUTOF10: 8
PAINLEVEL_OUTOF10: 6
PAINLEVEL_OUTOF10: 6
PAINLEVEL_OUTOF10: 5 - MODERATE PAIN
PAINLEVEL_OUTOF10: 3

## 2023-12-07 ASSESSMENT — COGNITIVE AND FUNCTIONAL STATUS - GENERAL
WALKING IN HOSPITAL ROOM: A LOT
DRESSING REGULAR UPPER BODY CLOTHING: A LOT
CLIMB 3 TO 5 STEPS WITH RAILING: TOTAL
PERSONAL GROOMING: A LOT
DRESSING REGULAR LOWER BODY CLOTHING: A LOT
EATING MEALS: A LOT
HELP NEEDED FOR BATHING: A LOT
TOILETING: A LOT
TURNING FROM BACK TO SIDE WHILE IN FLAT BAD: A LOT
MOVING TO AND FROM BED TO CHAIR: A LITTLE
STANDING UP FROM CHAIR USING ARMS: A LOT
DAILY ACTIVITIY SCORE: 12

## 2023-12-07 ASSESSMENT — PAIN - FUNCTIONAL ASSESSMENT
PAIN_FUNCTIONAL_ASSESSMENT: 0-10

## 2023-12-07 ASSESSMENT — COLUMBIA-SUICIDE SEVERITY RATING SCALE - C-SSRS
2. HAVE YOU ACTUALLY HAD ANY THOUGHTS OF KILLING YOURSELF?: NO
1. IN THE PAST MONTH, HAVE YOU WISHED YOU WERE DEAD OR WISHED YOU COULD GO TO SLEEP AND NOT WAKE UP?: NO
6. HAVE YOU EVER DONE ANYTHING, STARTED TO DO ANYTHING, OR PREPARED TO DO ANYTHING TO END YOUR LIFE?: NO

## 2023-12-07 ASSESSMENT — PAIN DESCRIPTION - ORIENTATION: ORIENTATION: LOWER;MID

## 2023-12-07 ASSESSMENT — PAIN DESCRIPTION - LOCATION: LOCATION: BACK

## 2023-12-07 ASSESSMENT — PAIN DESCRIPTION - DESCRIPTORS
DESCRIPTORS: ACHING
DESCRIPTORS: ACHING

## 2023-12-07 NOTE — BRIEF OP NOTE
Date: 2023  OR Location: [facility]    Name: Liban Jaramillo, : 1965, Age: 58 y.o., MRN: [id number], Sex: male    Diagnosis  Pre-op Diagnosis     * Coronary artery disease involving native coronary artery of native heart with unstable angina pectoris (CMS/HCC) [I25.110] Post-op Diagnosis     * Coronary artery disease involving native coronary artery of native heart with unstable angina pectoris (CMS/HCC) [I25.110]     Procedures  Coronary Artery Bypass x3 (LIMA-LAD, RA-OM, SVG-PDA), LeAAP's trial- may or may not have received clip, INTRAOPERATIVE BECCA, EVH LEFT RADIAL AND RIGHT LEG  53827 - NV CABG W/ARTERIAL GRAFT THREE ARTERIAL GRAFTS      Chest Tubes/Drains: 1 mediastinal chest tube, 1 right pleural chest tube, 1 left pleural chest tube.       Temporary Pacing Wires: ventricular    -Settings: back up   -Underlying Rhythm: sinus rhythm 78bpm    Permanent pacer/ICD: No   -Preoperative settings:    -Intra-op/ Postoperative settings:    Sternotomy performed by: Dr. Cheryl Murphy    Conduit Harvested by: RADIAL: Ankush  VEIN: Freddy    Sternal Wires placed by: Ankush    Arm/Leg/Groin Closure/Cutdown performed by: ARM: DASH Self, LEG: Freddy, CHEST: Marut & Freddy    Cardio Pulmonary Bypass Time: 69 minutes  Cross-clamp Time: 57 minutes  Circulatory Arrest: No Time:     Is patient candidate for Emergency Re-sternotomy? Yes   -If yes, POD #10 is -      Surgeons      * Ger Murphy - Primary    Resident/Fellow/Other Assistant:  Surgeon(s) and Role:     * Simone Self PA-C - Assisting    Procedure Summary  Anesthesia: * No anesthesia type entered *  ASA: IV  Anesthesia Staff: Anesthesiologist: Juan Benson MD; Blanquita Gonzalez MD  CRNA: KENNEDY Aragon-CRNA  Perfusionist: Celestino Thapa  Estimated Blood Loss: 250mL  Intra-op Medications:   Medication Name Total Dose   papaverine injection 60 mg   sodium chloride 0.9 % irrigation solution 6,000 mL   vancomycin (Vancocin) vial for injection 4 g   heparin  10,000 Units in sodium chloride 0.9 % 1,000 mL irrigation 2,000 mL   heparin 1,000 unit/mL injection 40,000 Units 40,000 Units              Anesthesia Record               Intraprocedure I/O Totals          Intake    NaCl 0.9 % 800.00 mL    Norepinephrine Drip 0.00 mL    The total shown is the total volume documented since Anesthesia Start was filed.    Aminocaproic Acid Drip 0.00 mL    The total shown is the total volume documented since Anesthesia Start was filed.    Nitroglycerin Drip 0.00 mL    The total shown is the total volume documented since Anesthesia Start was filed.    Propofol Drip 0.00 mL    The total shown is the total volume documented since Anesthesia Start was filed.    lactated Ringer's infusion 1200.00 mL    perfusion prime builder 560.00 mL    Cell Saver 500 mL    Total Intake 3060 mL       Output    Urine 545 mL    Total Output 545 mL       Net    Net Volume 2515 mL          Specimen: No specimens collected     Staff:   Circulator: Hien Goldberg RN  Relief Circulator: Lashay Knight RN  Scrub Person: Naun Palacios          Findings: coronary artery disease     Complications:  None; patient tolerated the procedure well.     Disposition: ICU - intubated and hemodynamically stable.  Condition: stable  Specimens Collected: No specimens collected  Attending Attestation:     Ger Murphy  Phone Number: [phone]

## 2023-12-07 NOTE — ANESTHESIA PROCEDURE NOTES
PA Catheter Placement:    Date/Time: 12/7/2023 8:17 AM    A PA catheter was placed in the pre-op for the following indication(s): central venous access and CVP monitoring.    Staffing  Performed: attending   Authorized by: Blanquita Gonzalez MD    Performed by: Blanquita Gonzalez MD    Completed: patient identified, IV checked, site marked, risks and benefits discussed, surgical consent, monitors and equipment checked, pre-op evaluation and timeout performed    Procedure Information    Sterility preparation included the following: provider hand hygiene performed prior to central venous catheter insertion, all 5 sterile barriers used (gloves, gown, cap, mask, large sterile drape) during central venous catheter insertion and skin prep agent completely dried prior to procedure.      The site was prepped with ChloraPrep.  Skin prep agent completely dried prior to procedure.    The patient was placed in Trendelenburg position.     PA catheter laterality: Right  Site: internal jugular vein  PA catheter placement: distal introducer port  Catheter size: 7.5  Catheter type: oximetric  Successful placement with 1 attempt(s)      The PAC placement was confirmed by pressure tracing changes and x-ray.    Post insertion care included: dressing applied.     Procedure was uneventful.

## 2023-12-07 NOTE — ANESTHESIA PROCEDURE NOTES
Central Venous Line:    Date/Time: 12/7/2023 8:17 AM    A central venous line was placed in the pre-op for the following indication(s): central venous access and CVP monitoring.  Staffing  Performed: attending   Authorized by: Blanquita Gonzalez MD    Performed by: Blanquita Gonzalez MD    Sterility preparation included the following: provider hand hygiene performed prior to central venous catheter insertion, all 5 sterile barriers used (gloves, gown, cap, mask, large sterile drape) during central venous catheter insertion, antiseptic used during central venous catheter insertion and skin prep agent completely dried prior to procedure.  The patient was placed in Trendelenburg position.    Right internal jugular vein was prepped.  Catheter type: introducer   Number of Lumens: single lumen    This catheter was an oximetric catheter.    During the procedure, the following specific steps were taken: target vein identified, needle advanced into vein and blood aspirated and guidewire advanced into vein.  Seldinger technique used.  Procedure performed using surface landmarks.    Intravenous verification was obtained by x-ray.      Post insertion care included: all ports aspirated, all ports flushed easily, guidewire removed intact, Biopatch applied, line sutured in place and dressing applied.  A oximetric, 7.5 (size) Pulmonary Artery Catheter (PAC) was placed through the Introducer CVL in the right internal jugular vein.  The PAC placement was confirmed by pressure tracing changes and x-ray.  The patient experienced the following events during the procedure: patient tolerated procedure well with no complications.

## 2023-12-07 NOTE — ANESTHESIA PREPROCEDURE EVALUATION
Patient: Liban Jaramillo    Procedure Information       Date/Time: 12/07/23 0815    Procedure: Creation Bypass Graft Coronary Artery with Arterial Graft    Location: [facility] 09 / [facility]    Surgeons: Ger Murphy MD            Relevant Problems   Cardiovascular   (+) Abnormal EKG   (+) Coronary artery disease involving native coronary artery of native heart with unstable angina pectoris (CMS/HCC)   (+) Coronary artery disease involving native coronary artery of native heart without angina pectoris   (+) Hypertension      Endocrine   (+) Hypothyroidism   (+) Type 2 diabetes mellitus with cardiac complication (CMS/HCC)      Pulmonary   (+) SMALLWOOD (dyspnea on exertion)   (+) ANA (obstructive sleep apnea)       Clinical information reviewed:   Tobacco  Allergies  Meds  Problems  Med Hx  Surg Hx   Fam Hx  Soc   Hx        NPO Detail:  NPO/Void Status  Carbonhydrate Drink Given Prior to Surgery? : N  Date of Last Liquid: 12/06/23  Time of Last Liquid: 2200  Date of Last Solid: 12/06/23  Time of Last Solid: 2200  Last Intake Type: Clear fluids  Time of Last Void: 0430         Physical Exam    Airway  Mallampati: I  TM distance: >3 FB  Neck ROM: full     Cardiovascular - normal exam     Dental    Pulmonary - normal exam     Abdominal - normal exam             Anesthesia Plan    ASA 4     general     The patient is not a current smoker.  Patient was previously instructed to abstain from smoking on day of procedure.  Patient did not smoke on day of procedure.  Education provided regarding risk of obstructive sleep apnea.  intravenous induction   Anesthetic plan and risks discussed with patient.  Use of blood products discussed with who consented to blood products.    Plan discussed with CRNA and attending.

## 2023-12-07 NOTE — OP NOTE
Coronary Artery Bypass x3 (LIMA-LAD, RA-OM, SVG-PDA), LeA[employer]'s trial- may or may not have received clip, INTRAOPERATIVE BECCA, EVH LEFT RADIAL AND RIGHT LEG Operative Note     Date: 2023  OR Location: [facility]    Name: Liban Jaramillo, : 1965, Age: 58 y.o., MRN: [id number], Sex: male    Diagnosis  Pre-op Diagnosis     * Coronary artery disease involving native coronary artery of native heart with unstable angina pectoris (CMS/HCC) [I25.110] Post-op Diagnosis     * Coronary artery disease involving native coronary artery of native heart with unstable angina pectoris (CMS/HCC) [I25.110]     Procedures  Coronary Artery Bypass x3 (LIMA-LAD, RA-OM, SVG-PDA), LeA[employer]'s trial- may or may not have received clip, INTRAOPERATIVE BECCA, EVH LEFT RADIAL AND RIGHT LEG  19886 - [place] CABG W/ARTERIAL GRAFT THREE ARTERIAL GRAFTS  1.  CABG x 3 with a LIMA skeletonized the LAD, left radial to the obtuse marginal and right saphenous vein graft to the PDA.  2.  Endoscopic radial and vein harvest.  3.  Median sternotomy.    4.The patient agreed to participate in the ongoing [employer] trial so he may or may not receive the left heart appendage clip.      Surgeons      * Ger Murphy - Primary    Resident/Fellow/Other Assistant:  Surgeon(s) and Role:     * Simone Self PA-C - Assisting Lawrence leary    Procedure Summary  Anesthesia: * No anesthesia type entered *  ASA: IV  Anesthesia Staff: Anesthesiologist: Blanquita Gonzalez MD  CRNA: KENNEDY Aragon-CRNA  Perfusionist: Celestino Thapa  Estimated Blood Loss: 200 mL  Intra-op Medications:   Medication Name Total Dose   papaverine injection 60 mg   sodium chloride 0.9 % irrigation solution 6,000 mL   vancomycin (Vancocin) vial for injection 4 g   heparin 10,000 Units in sodium chloride 0.9 % 1,000 mL irrigation 2,000 mL   heparin 1,000 unit/mL injection 40,000 Units 40,000 Units              Anesthesia Record               Intraprocedure I/O Totals          Intake    Norepinephrine  Drip 0.00 mL    The total shown is the total volume documented since Anesthesia Start was filed.    Aminocaproic Acid Drip 0.00 mL    The total shown is the total volume documented since Anesthesia Start was filed.    Nitroglycerin Drip 0.00 mL    The total shown is the total volume documented since Anesthesia Start was filed.    Propofol Drip 0.00 mL    The total shown is the total volume documented since Anesthesia Start was filed.    perfusion prime builder 560.00 mL    Cell Saver 500 mL    Total Intake 1060 mL       Output    Urine 345 mL    Total Output 345 mL       Net    Net Volume 715 mL          Specimen: No specimens collected     Staff:   Circulator: Hien Goldberg RN  Relief Circulator: Lashay Knight RN  Scrub Person: Naun Palacios         Drains and/or Catheters:   Chest Tube 1 Right Pleural 28 Fr (Active)       Chest Tube 2 Mediastinal 28 Fr (Active)       Chest Tube 3 Left Pleural 28 Fr (Active)       Urethral Catheter Straight-tip 16 Fr. (Active)       Tourniquet Times:     Total Tourniquet Time Documented:  Arm - Upper (Right) - 15 minutes  Total: Arm - Upper (Right) - 15 minutes      Implants:     Findings: 1.There were no pericardial adhesions or effusions.   2. The ascending aorta was soft without evidence of atherosclerosis.   3. The heart was normal sinus rhythm and demonstrated normal left ventricular function.    4.The patient had severe diffuse coronary artery disease, however we were able to find locations on the LAD, obtuse marginal and PDA that was suitable for grafting.  5.  The conduits were suitable for grafting.  6.  The flow of the graft after the protamine were acceptable.     Indications: Liban Jaramillo is an 58 y.o. male who is having surgery for Coronary artery disease involving native coronary artery of native heart with unstable angina pectoris (CMS/[facility]) [I25.110]. Coronary angiography demonstrated triple-vessel disease with proximal LAD.  Echocardiography demonstrated  normal.  Discussed the risk and benefit with the patient and he agreed to proceed.    Class 1 indication for left ventricular function and no significant valvular heart disease.  The patient was referred for coronary artery bypass grafting.  CABG.    The patient was seen in the preoperative area. The risks, benefits, complications, treatment options, non-operative alternatives, expected recovery and outcomes were discussed with the patient. The possibilities of reaction to medication, pulmonary aspiration, injury to surrounding structures, bleeding, recurrent infection, the need for additional procedures, failure to diagnose a condition, and creating a complication requiring transfusion or operation were discussed with the patient. The patient concurred with the proposed plan, giving informed consent.  The site of surgery was properly noted/marked if necessary per policy. The patient has been actively warmed in preoperative area. Preoperative antibiotics  Venous thrombosis prophylaxis     Procedure Details: A median sternotomy was performed.  The left internal thoracic arteries were dissected from the chest wall in a skeletonized manner, the left radial was obtained endoscopically and  Right saphenous vein was harvested endoscopic from the thigh.  The thymus was divided and the pericardium was opened.  The ascending aorta was palpated and it was without evidence of atherosclerosis.  The patient was heparinized.  The distal ascending aorta and right atrium were cannulated for cardiopulmonary bypass and ante grade and retrograde cardioplegia cannulas were placed.  The patient was placed on cardiopulmonary bypass, the ascending aorta was cross clamped, and the heart was arrested and protected with cold blood cardioplegia.  During the remainder of the cross-clamp time cold blood cardioplegia was given every 15 to 20 minutes.    Reverse length of saphenous vein was anastomosed to the PDA As an end-to-side anastomosis  using 7-0 Prolene in a running fashion.  The proximal of the vein was anastomosed to the ascending aorta using, 6-0 Prolene and a 4 mm punch after the completions of the distals  The radial artery was anastomosed to the. OM  As an end-to-side anastomosis using 7-0 Prolene in a running fashion.  The proximal of the radial was anastomosed to the faye of the SVG.  The left internal thoracic artery was anastomosed in situ graft to the LAD  An end-to-side anastomosis was performed using 7-0 Prolene in a running fashion.  Immediately after the flow of the graft was measured.   The heart and ascending aorta were de-aired and the aortic cross-clamp was removed.  When the patient's heart was completely recovered and thoroughly de-aired he was weaned from cardiopulmonary bypass without difficulty.  All cannulas were removed and the heparin effect was reversed with protamine.  Hemostasis was obtained, mediastinal and bilateral pleural chest tubes were placed, right ventricular pacemaker wires were placed on the diaphragmatic surface of the RV, and the wounds were closed in the standard fashion.  The patient tolerated the procedure well and was brought to the intensive care unit in stable condition.  To sponge counts, instrument counts, and needle counts were reported correct by the circulating nurse.  There were no specimens sent to pathology.  The drains included mediastinal and pleural chest tub.e.  Pump Time 67 min, cross clamp 59  Complications:  None; patient tolerated the procedure well.    Disposition: ICU - intubated and hemodynamically stable.  Condition: stable         Additional Details:     Attending Attestation: I was present and scrubbed for the entire procedure.    Ger Murphy  Phone Number: [phone]

## 2023-12-07 NOTE — H&P
"[facility]       Date:  12/7/2023  Patient:  Liban Jaramillo  YOB: 1965  MRN:  [id number]   Admit Date:  12/7/2023  ========================================================================================================        History of Present Illness:  Liban Jaramillo is a 58 y.o. year old male patient with Past Medical History of hypertension, hyperlipidemia, hypothyroidism, tobacco use and CAD.  He was referred by cardiology for coronary bypass grafting. He initially presented to ED on 10/21/23 endorsing intermittent mid sternal chest pain unrelieved by nitroglycerin. He underwent cardiac catheterization that showed 90% stenosis in LAD, 95% stenosis in left circumflex as well as 50% stenosis in left main. Right coronary artery was without disease and LVEF was normal at 60%.  On 12/7, patient underwent on pump x 3 CABG, LIMA-LAD, RA-OM, SVG-PDA.     Interval ICU Events:  On 12/7, patient underwent on pump x 3 CABG, LIMA-LAD, RA-OM, SVG-PDA Cross clamp time was 57 min, on pump for 69 min. He returned to ICU in stable postoperative condition with Ventricular wires, 1 mediastinal chest tube, 1 R pleural chest tube and 1 left pleural chest tube, intubated and sedation on propofol. On arrival to ICU, pt was given 200 mcg of push dose epi for hypotension with great response.   Medical History:  Past Medical History:   Diagnosis Date    Anxiety     Arthritis     Cervical disc disease     COPD (chronic obstructive pulmonary disease) (CMS/HCC)     Coronary artery disease     Diabetes mellitus (CMS/HCC)     \"diet controlled\" and is not testing his glucose    Disease of thyroid gland     GERD (gastroesophageal reflux disease)     Hyperlipidemia     Hypertension     Myocardial infarction (CMS/HCC)     Nephrolithiasis     PTSD (post-traumatic stress disorder)     Skin disorder     states rash on occassion    Wears glasses     [place] teeth extracted      Past Surgical History:   Procedure " Laterality Date    CARDIAC CATHETERIZATION      HERNIA REPAIR      TONSILLECTOMY      WISDOM TOOTH EXTRACTION       Medications Prior to Admission   Medication Sig Dispense Refill Last Dose    aspirin 81 mg EC tablet Take 1 tablet (81 mg) by mouth once daily in the morning. Take before meals. 90 tablet 1 Past Week    atorvastatin (Lipitor) 80 mg tablet Take 1 tablet (80 mg) by mouth once daily. 90 tablet 1 Past Week    isosorbide mononitrate ER (Imdur) 30 mg 24 hr tablet Take 1 tablet (30 mg) by mouth once daily. 90 tablet 1 Past Week    levothyroxine (Synthroid, Levoxyl) 88 mcg tablet Take 1 tablet (88 mcg) by mouth once daily. 60 tablet 0 Past Week    lisinopril 20 mg tablet Take 1 tablet (20 mg) by mouth once daily. 90 tablet 1 Past Week    metoprolol tartrate (Lopressor) 50 mg tablet Take 1 tablet by mouth 2 times a day. (Patient taking differently: Take 1 tablet by mouth once daily. 12/4/23 patient states he takes 50 mg daily in the morning) 180 tablet 1 Past Week    mupirocin (Bactroban) 2 % ointment As directed   12/7/2023 at 0600    nitroglycerin (Nitrostat) 0.4 mg SL tablet Place 1 tablet (0.4 mg) under the tongue every 5 minutes if needed for chest pain. 90 tablet 1 Past Week    ranolazine (Ranexa) 500 mg 12 hr tablet Take 1 tablet (500 mg) by mouth 2 times a day. 180 tablet 1 Past Week    Advair HFA 45-21 mcg/actuation inhaler Inhale 2 puffs 2 times a day as needed. States he uses it PRN   More than a month    blood sugar diagnostic strip 1 each once daily. 100 strip 3 Unknown    FreeStyle glucose monitoring kit 1 each 3 times a day as needed (for low blood sugar symptoms). 1 kit 0 Unknown    lancets misc 1 each once daily. 100 each 3 Unknown at f     Penicillins  Social History     Tobacco Use    Smoking status: Every Day     Packs/day: 0.50     Years: 46.00     Additional pack years: 0.00     Total pack years: 23.00     Types: Cigarettes    Smokeless tobacco: Never   Vaping Use    Vaping Use: Never used    Substance Use Topics    Alcohol use: Yes     Alcohol/week: 3.0 standard drinks of alcohol     Types: 3 Cans of beer per week    Drug use: Not Currently     Comment: before everything     Family History   Problem Relation Name Age of Onset    Diabetes Mother      Cancer Mother      Diabetes Father      Liver disease Father      Esophageal cancer Brother      Throat cancer Maternal Grandfather         [facility] Medications:    aminocaproic acid, 1 g/hr  EPINEPHrine, 0-2 mcg/kg/min (Dosing Weight)  insulin regular infusion for cardiac surgery, 0-15 Units/hr  lactated Ringer's, 50 mL/hr, Last Rate: 50 mL/hr (12/07/23 1355)  lactated Ringer's, 5 mL/hr  niCARdipine, 2.5-15 mg/hr  norepinephrine, 0-1 mcg/kg/min (Dosing Weight)  propofol, 0-50 mcg/kg/min          Current Facility-Administered Medications:     acetaminophen (Tylenol) tablet 650 mg, 650 mg, oral, q4h **OR** acetaminophen (Tylenol) suppository 650 mg, 650 mg, rectal, q4h, TAMEKA Abreu    albumin human 5 % infusion 25 g, 25 g, intravenous, Daily PRN, TAMEKA Abreu    aminocaproic acid (Amicar) infusion, 1 g/hr, intravenous, Continuous, Blanqiuta Gonzalez MD    aspirin chewable tablet 81 mg, 81 mg, oral, Once **OR** aspirin chewable tablet 81 mg, 81 mg, nasogastric tube, Once **OR** aspirin suppository 150 mg, 150 mg, rectal, Once, TAMEKA Abreu    atorvastatin (Lipitor) tablet 80 mg, 80 mg, oral, Nightly, TAMEKA Abreu    bisacodyl (Dulcolax) EC tablet 10 mg, 10 mg, oral, Daily PRN, ATMEKA Abreu    dextrose 50 % injection 25 g, 25 g, intravenous, q15 min PRN **OR** glucagon (Glucagen) injection 1 mg, 1 mg, intramuscular, q15 min PRN, TAMEKA Abreu    EPINEPHrine (Adrenalin) 4,000 mcg in dextrose 5 % in water (D5W) 250 mL (16 mcg/mL) infusion, 0-2 mcg/kg/min (Dosing Weight), intravenous, Continuous, Neetu R Callaway, APRN-CNP    fluticasone propion-salmeteroL (Advair) 45-21  mcg/actuation inhaler 2 puff, 2 puff, inhalation, BID, Neetu R Callaway, APRN-CNP    glycerin (Adult) 2 g suppository 1 suppository, 1 suppository, rectal, Daily PRN, Neetu R Callaway, APRN-CNP    HYDROmorphone (Dilaudid) injection 0.5 mg, 0.5 mg, intravenous, q15 min PRN, Neetu R Callaway, APRN-CNP    insulin regular (HumuLIN R) 100 Units in sodium chloride 0.9% 100 mL (1 Units/mL) infusion, 0-15 Units/hr, intravenous, Continuous, Neetu R Callaway, APRN-CNP    insulin regular (HumuLIN, NovoLIN) bolus from bag 0-15 Units, 0-15 Units, intravenous, Once, Neetu R Callaway, APRN-CNP    insulin regular (HumuLIN, NovoLIN) bolus from bag 0-15 Units, 0-15 Units, intravenous, PRN, Neetu R Callaway, APRN-CNP    ipratropium-albuteroL (Duo-Neb) 0.5-2.5 mg/3 mL nebulizer solution 3 mL, 3 mL, nebulization, TID, Neetu R Callaway, APRN-CNP    lactated Ringer's infusion, 50 mL/hr, intravenous, Continuous, Neetu R Callaway, APRN-CNP, Last Rate: 50 mL/hr at 12/07/23 1355, 50 mL/hr at 12/07/23 1355    lactated Ringer's infusion, 5 mL/hr, intravenous, Continuous, Neetu R Callaway, APRN-CNP    levothyroxine (Synthroid, Levoxyl) tablet 88 mcg, 88 mcg, oral, Daily, Neetu R Callaway, APRN-CNP    magnesium sulfate IV 2 g, 2 g, intravenous, q6h PRN, Neetu R Callaway, APRN-CNP    magnesium sulfate IV 4 g, 4 g, intravenous, q6h PRN, Neetu R Callaway, APRN-CNP    naloxone (Narcan) injection 0.2 mg, 0.2 mg, intravenous, q5 min PRN, Neetu R Callaway, APRN-CNP    niCARdipine (Cardene) 40 mg in sodium chloride 200 mL (0.2 mg/mL) infusion (premix), 2.5-15 mg/hr, intravenous, Continuous PRN, KENNEDY Abreu-CNP    norepinephrine (Levophed) 8 mg in dextrose 5% 250 mL (0.032 mg/mL) infusion (premix), 0-1 mcg/kg/min (Dosing Weight), intravenous, Continuous, Neetu Callaway APRN-CNP    ondansetron (Zofran) tablet 4 mg, 4 mg, oral, q8h PRN **OR** ondansetron (Zofran) injection 4 mg, 4 mg, intravenous, q8h PRN, Neetu Callaway APRN-CNP     oxyCODONE (Roxicodone) immediate release tablet 5 mg, 5 mg, oral, q4h PRN, TAMEKA Abreu    oxygen (O2) therapy, , inhalation, Continuous PRN - O2/gases, TAMEKA Abreu    [START ON 12/8/2023] pantoprazole (ProtoNix) EC tablet 40 mg, 40 mg, oral, Daily before breakfast **OR** [START ON 12/8/2023] pantoprazole (ProtoNix) injection 40 mg, 40 mg, intravenous, Daily before breakfast, KENNEDY Abreu-CNP    polyethylene glycol (Glycolax, Miralax) packet 17 g, 17 g, oral, Daily, KENNEDY Abreu-CNP    potassium chloride CR (Klor-Con M20) ER tablet 20 mEq, 20 mEq, oral, q6h PRN **OR** potassium chloride (Klor-Con) packet 20 mEq, 20 mEq, oral, q6h PRN, KENNEDY Abreu-CNP    potassium chloride CR (Klor-Con M20) ER tablet 40 mEq, 40 mEq, oral, q6h PRN **OR** potassium chloride (Klor-Con) packet 40 mEq, 40 mEq, oral, q6h PRN, KENNEDY Abreu-CNP    potassium chloride 20 mEq in 100 mL IV premix, 20 mEq, intravenous, q6h PRN, KENNEDY Abreu-CNP    potassium chloride 40 mEq in dextrose 5 % in water (D5W) 250 mL IV, 40 mEq, intravenous, q6h PRN, KENNEDY Abreu-CNP    propofol (Diprivan) infusion, 0-50 mcg/kg/min, intravenous, Continuous, KENNEDY Abreu-CNP    vancomycin (Vancocin) in dextrose 5 % water (D5W) 250 mL IV 1,250 mg, 1,250 mg, intravenous, q12h, TAMEKA Abreu    Facility-Administered Medications Ordered in Other Encounters:     albumin human infusion  - Omnicell Override Pull, , , ,     Review of Systems:  14 point review of systems was unable to be completed on admission to ICU, intubated and sedated     Physical Exam:    Heart Rate:  [76-91]   Temp:  [36.9 °C (98.4 °F)]   Resp:  [16-19]   BP: (133-154)/(89-93)   Weight:  [97.8 kg (215 lb 9.8 oz)]   SpO2:  [97 %-100 %]     Physical Exam  Vitals and nursing note reviewed.   Constitutional:       Appearance: He is ill-appearing.      Interventions: He is sedated and intubated.    Neck:      Trachea: Trachea normal.      Comments: Cordis   Cardiovascular:      Rate and Rhythm: Normal rate and regular rhythm.      Pulses: Normal pulses.      Comments: V wires placed   Pulmonary:      Effort: He is intubated.      Breath sounds: Normal breath sounds.      Comments: 1 mediastinal chest tube, 1 R and L pleural chest tube- draining scant drainage.   Chest:      Comments: Midline incision bandage CDI  Abdominal:      General: Abdomen is flat. Bowel sounds are decreased.      Palpations: Abdomen is soft.   Musculoskeletal:      Cervical back: Neck supple.      Right lower leg: No edema.      Left lower leg: No edema.   Skin:     General: Skin is warm.      Capillary Refill: Capillary refill takes less than 2 seconds.      Comments: Bandage to right lower leg, cap refil WNL, Bandage to R arm, cap refill WNL         Objective:    Results for orders placed or performed during the hospital encounter of 12/07/23 (from the past 24 hour(s))   POCT GLUCOSE   Result Value Ref Range    POCT Glucose 118 (H) 74 - 99 mg/dL   VERIFY ABO/Rh Group Test   Result Value Ref Range    ABO TYPE A     Rh TYPE NEG    Prepare RBC: 2 Units   Result Value Ref Range    PRODUCT CODE O6512P49     Unit Number [id number]     Unit ABO A     Unit RH NEG     XM INTEP COMP     Dispense Status XM     Blood Expiration Date December 21, 2023 23:59 EST     PRODUCT BLOOD TYPE 0600     UNIT VOLUME 350     PRODUCT CODE Z5073D81     Unit Number [id number]     Unit ABO A     Unit RH NEG     XM INTEP COMP     Dispense Status XM     Blood Expiration Date December 22, 2023 23:59 EST     PRODUCT BLOOD TYPE 0600     UNIT VOLUME 350    Blood Gas Arterial Full Panel Unsolicited   Result Value Ref Range    POCT pH, Arterial 7.41 7.38 - 7.42 pH    POCT pCO2, Arterial 43 (H) 38 - 42 mm Hg    POCT pO2, Arterial 187 (H) 85 - 95 mm Hg    POCT SO2, Arterial 99 94 - 100 %    POCT Oxy Hemoglobin, Arterial 94.9 94.0 - 98.0 %    POCT Hematocrit  Calculated, Arterial 43.0 41.0 - 52.0 %    POCT Sodium, Arterial 135 (L) 136 - 145 mmol/L    POCT Potassium, Arterial 4.0 3.5 - 5.3 mmol/L    POCT Chloride, Arterial 103 98 - 107 mmol/L    POCT Ionized Calcium, Arterial 1.23 1.10 - 1.33 mmol/L    POCT Glucose, Arterial 157 (H) 74 - 99 mg/dL    POCT Lactate, Arterial 1.1 0.4 - 2.0 mmol/L    POCT Base Excess, Arterial 2.2 -2.0 - 3.0 mmol/L    POCT HCO3 Calculated, Arterial 27.3 (H) 22.0 - 26.0 mmol/L    POCT Hemoglobin, Arterial 14.3 13.5 - 17.5 g/dL    POCT Anion Gap, Arterial 9 (L) 10 - 25 mmo/L    Patient Temperature     ACTIVATED CLOTTING TIME HIGH   Result Value Ref Range    POCT Activated Clotting Time High Range 117 96 - 152 sec   Blood Gas Venous Full Panel Unsolicited   Result Value Ref Range    POCT pH, Venous 7.33 7.33 - 7.43 pH    POCT pCO2, Venous 52 (H) 41 - 51 mm Hg    POCT pO2, Venous 61 (H) 35 - 45 mm Hg    POCT SO2, Venous 89 (H) 45 - 75 %    POCT Oxy Hemoglobin, Venous 85.7 (H) 45.0 - 75.0 %    POCT Hematocrit Calculated, Venous 41.0 41.0 - 52.0 %    POCT Sodium, Venous 135 (L) 136 - 145 mmol/L    POCT Potassium, Venous 4.0 3.5 - 5.3 mmol/L    POCT Chloride, Venous 103 98 - 107 mmol/L    POCT Ionized Calicum, Venous 1.22 1.10 - 1.33 mmol/L    POCT Glucose, Venous 159 (H) 74 - 99 mg/dL    POCT Lactate, Venous 1.5 0.4 - 2.0 mmol/L    POCT Base Excess, Venous 0.5 -2.0 - 3.0 mmol/L    POCT HCO3 Calculated, Venous 27.4 (H) 22.0 - 26.0 mmol/L    POCT Hemoglobin, Venous 13.8 13.5 - 17.5 g/dL    POCT Anion Gap, Venous 9.0 (L) 10.0 - 25.0 mmol/L    Patient Temperature     Blood Gas Arterial Full Panel Unsolicited   Result Value Ref Range    POCT pH, Arterial 7.30 (L) 7.38 - 7.42 pH    POCT pCO2, Arterial 54 (H) 38 - 42 mm Hg    POCT pO2, Arterial 257 (H) 85 - 95 mm Hg    POCT SO2, Arterial 100 94 - 100 %    POCT Oxy Hemoglobin, Arterial 96.4 94.0 - 98.0 %    POCT Hematocrit Calculated, Arterial 42.0 41.0 - 52.0 %    POCT Sodium, Arterial 134 (L) 136 - 145  mmol/L    POCT Potassium, Arterial 4.9 3.5 - 5.3 mmol/L    POCT Chloride, Arterial 104 98 - 107 mmol/L    POCT Ionized Calcium, Arterial 1.19 1.10 - 1.33 mmol/L    POCT Glucose, Arterial 142 (H) 74 - 99 mg/dL    POCT Lactate, Arterial 1.8 0.4 - 2.0 mmol/L    POCT Base Excess, Arterial -0.8 -2.0 - 3.0 mmol/L    POCT HCO3 Calculated, Arterial 26.6 (H) 22.0 - 26.0 mmol/L    POCT Hemoglobin, Arterial 13.9 13.5 - 17.5 g/dL    POCT Anion Gap, Arterial 8 (L) 10 - 25 mmo/L    Patient Temperature     Blood Gas Arterial Full Panel Unsolicited   Result Value Ref Range    POCT pH, Arterial 7.26 (L) 7.38 - 7.42 pH    POCT pCO2, Arterial 63 (H) 38 - 42 mm Hg    POCT pO2, Arterial 408 (H) 85 - 95 mm Hg    POCT SO2, Arterial 99 94 - 100 %    POCT Oxy Hemoglobin, Arterial 96.1 94.0 - 98.0 %    POCT Hematocrit Calculated, Arterial 35.0 (L) 41.0 - 52.0 %    POCT Sodium, Arterial 134 (L) 136 - 145 mmol/L    POCT Potassium, Arterial 6.2 (HH) 3.5 - 5.3 mmol/L    POCT Chloride, Arterial 104 98 - 107 mmol/L    POCT Ionized Calcium, Arterial 1.16 1.10 - 1.33 mmol/L    POCT Glucose, Arterial 147 (H) 74 - 99 mg/dL    POCT Lactate, Arterial 1.9 0.4 - 2.0 mmol/L    POCT Base Excess, Arterial 0.1 -2.0 - 3.0 mmol/L    POCT HCO3 Calculated, Arterial 28.3 (H) 22.0 - 26.0 mmol/L    POCT Hemoglobin, Arterial 11.6 (L) 13.5 - 17.5 g/dL    POCT Anion Gap, Arterial 8 (L) 10 - 25 mmo/L    Patient Temperature      FiO2 80 %   Blood Gas Venous Full Panel Unsolicited   Result Value Ref Range    POCT pH, Venous 7.25 (LL) 7.33 - 7.43 pH    POCT pCO2, Venous 66 (H) 41 - 51 mm Hg    POCT pO2, Venous 67 (H) 35 - 45 mm Hg    POCT SO2, Venous 92 (H) 45 - 75 %    POCT Oxy Hemoglobin, Venous 88.9 (H) 45.0 - 75.0 %    POCT Hematocrit Calculated, Venous 35.0 (L) 41.0 - 52.0 %    POCT Sodium, Venous 134 (L) 136 - 145 mmol/L    POCT Potassium, Venous 6.0 (H) 3.5 - 5.3 mmol/L    POCT Chloride, Venous 103 98 - 107 mmol/L    POCT Ionized Calicum, Venous 1.19 1.10 - 1.33  mmol/L    POCT Glucose, Venous 149 (H) 74 - 99 mg/dL    POCT Lactate, Venous 1.9 0.4 - 2.0 mmol/L    POCT Base Excess, Venous 0.4 -2.0 - 3.0 mmol/L    POCT HCO3 Calculated, Venous 28.9 (H) 22.0 - 26.0 mmol/L    POCT Hemoglobin, Venous 11.6 (L) 13.5 - 17.5 g/dL    POCT Anion Gap, Venous 8.0 (L) 10.0 - 25.0 mmol/L    Patient Temperature      FiO2 70 %   Blood Gas Arterial Full Panel Unsolicited   Result Value Ref Range    POCT pH, Arterial 7.33 (L) 7.38 - 7.42 pH    POCT pCO2, Arterial 47 (H) 38 - 42 mm Hg    POCT pO2, Arterial 241 (H) 85 - 95 mm Hg    POCT SO2, Arterial 99 94 - 100 %    POCT Oxy Hemoglobin, Arterial 95.6 94.0 - 98.0 %    POCT Hematocrit Calculated, Arterial 35.0 (L) 41.0 - 52.0 %    POCT Sodium, Arterial 133 (L) 136 - 145 mmol/L    POCT Potassium, Arterial 5.8 (H) 3.5 - 5.3 mmol/L    POCT Chloride, Arterial 104 98 - 107 mmol/L    POCT Ionized Calcium, Arterial 1.17 1.10 - 1.33 mmol/L    POCT Glucose, Arterial 153 (H) 74 - 99 mg/dL    POCT Lactate, Arterial 1.8 0.4 - 2.0 mmol/L    POCT Base Excess, Arterial -1.4 -2.0 - 3.0 mmol/L    POCT HCO3 Calculated, Arterial 24.8 22.0 - 26.0 mmol/L    POCT Hemoglobin, Arterial 11.7 (L) 13.5 - 17.5 g/dL    POCT Anion Gap, Arterial 10 10 - 25 mmo/L    Patient Temperature      FiO2 70 %   Blood Gas Arterial Full Panel Unsolicited   Result Value Ref Range    POCT pH, Arterial 7.36 (L) 7.38 - 7.42 pH    POCT pCO2, Arterial 44 (H) 38 - 42 mm Hg    POCT pO2, Arterial 146 (H) 85 - 95 mm Hg    POCT SO2, Arterial 99 94 - 100 %    POCT Oxy Hemoglobin, Arterial 96.1 94.0 - 98.0 %    POCT Hematocrit Calculated, Arterial 37.0 (L) 41.0 - 52.0 %    POCT Sodium, Arterial 134 (L) 136 - 145 mmol/L    POCT Potassium, Arterial 4.7 3.5 - 5.3 mmol/L    POCT Chloride, Arterial 106 98 - 107 mmol/L    POCT Ionized Calcium, Arterial 1.13 1.10 - 1.33 mmol/L    POCT Glucose, Arterial 121 (H) 74 - 99 mg/dL    POCT Lactate, Arterial 2.2 (H) 0.4 - 2.0 mmol/L    POCT Base Excess, Arterial -0.8  -2.0 - 3.0 mmol/L    POCT HCO3 Calculated, Arterial 24.9 22.0 - 26.0 mmol/L    POCT Hemoglobin, Arterial 12.4 (L) 13.5 - 17.5 g/dL    POCT Anion Gap, Arterial 8 (L) 10 - 25 mmo/L    Patient Temperature      FiO2 100 %   ECG 12 Lead   Result Value Ref Range    Ventricular Rate 80 BPM    Atrial Rate 80 BPM    HI Interval 148 ms    QRS Duration 90 ms    QT Interval 404 ms    QTC Calculation(Bazett) 465 ms    P Axis 42 degrees    R Axis -1 degrees    T Axis 36 degrees    QRS Count 14 beats    Q Onset 227 ms    P Onset 153 ms    P Offset 208 ms    T Offset 429 ms    QTC Fredericia 445 ms   Blood Gas Arterial Full Panel   Result Value Ref Range    POCT pH, Arterial 7.32 (L) 7.38 - 7.42 pH    POCT pCO2, Arterial 48 (H) 38 - 42 mm Hg    POCT pO2, Arterial 76 (L) 85 - 95 mm Hg    POCT SO2, Arterial 95 94 - 100 %    POCT Oxy Hemoglobin, Arterial 92.5 (L) 94.0 - 98.0 %    POCT Hematocrit Calculated, Arterial 41.0 41.0 - 52.0 %    POCT Sodium, Arterial 135 (L) 136 - 145 mmol/L    POCT Potassium, Arterial 4.6 3.5 - 5.3 mmol/L    POCT Chloride, Arterial 105 98 - 107 mmol/L    POCT Ionized Calcium, Arterial 1.10 1.10 - 1.33 mmol/L    POCT Glucose, Arterial 119 (H) 74 - 99 mg/dL    POCT Lactate, Arterial 2.0 0.4 - 2.0 mmol/L    POCT Base Excess, Arterial -1.8 -2.0 - 3.0 mmol/L    POCT HCO3 Calculated, Arterial 24.7 22.0 - 26.0 mmol/L    POCT Hemoglobin, Arterial 13.5 13.5 - 17.5 g/dL    POCT Anion Gap, Arterial 10 10 - 25 mmo/L    Patient Temperature      FiO2 60 %    Ventilator Mode A/C     Ventilator Rate 16 bpm    Tidal Volume 500 mL    Peep CHM2O 5.0 cm H2O    Site of Arterial Puncture Arterial Line    Magnesium   Result Value Ref Range    Magnesium 2.59 (H) 1.60 - 2.40 mg/dL   Coagulation Screen   Result Value Ref Range    Protime 13.9 (H) 9.8 - 12.8 seconds    INR 1.2 (H) 0.9 - 1.1    aPTT 31 27 - 38 seconds   Fibrinogen   Result Value Ref Range    Fibrinogen 234 200 - 400 mg/dL   CBC   Result Value Ref Range    WBC 13.9 (H)  4.4 - 11.3 x10*3/uL    nRBC 0.0 0.0 - 0.0 /100 WBCs    RBC 4.22 (L) 4.50 - 5.90 x10*6/uL    Hemoglobin 12.8 (L) 13.5 - 17.5 g/dL    Hematocrit 38.4 (L) 41.0 - 52.0 %    MCV 91 80 - 100 fL    MCH 30.3 26.0 - 34.0 pg    MCHC 33.3 32.0 - 36.0 g/dL    RDW 13.8 11.5 - 14.5 %    Platelets 155 150 - 450 x10*3/uL   Renal Function Panel   Result Value Ref Range    Glucose 119 (H) 74 - 99 mg/dL    Sodium 138 136 - 145 mmol/L    Potassium 4.3 3.5 - 5.3 mmol/L    Chloride 108 (H) 98 - 107 mmol/L    Bicarbonate 26 21 - 32 mmol/L    Anion Gap 8 (L) 10 - 20 mmol/L    Urea Nitrogen 9 6 - 23 mg/dL    Creatinine 0.91 0.50 - 1.30 mg/dL    eGFR >90 >60 mL/min/1.73m*2    Calcium 8.2 (L) 8.6 - 10.3 mg/dL    Phosphorus 3.2 2.5 - 4.9 mg/dL    Albumin 3.5 3.4 - 5.0 g/dL   POCT GLUCOSE   Result Value Ref Range    POCT Glucose 111 (H) 74 - 99 mg/dL      ECG 12 Lead    Result Date: 12/7/2023  Normal sinus rhythm Normal ECG When compared with ECG of 06-DEC-2023 12:56, (unconfirmed) No significant change was found Confirmed by Mario Lennon ([id number]) on 12/7/2023 4:13:49 PM    ECG 12 lead (Clinic Performed)    Result Date: 12/7/2023  Normal sinus rhythm Inferior infarct (Cannot exclude) Abnormal ECG No previous ECGs available Confirmed by Mario Lennon ([id number]) on 12/7/2023 4:04:48 PM    XR chest 1 view    Result Date: 12/7/2023  Interpreted By:  Waldo Carmichael, STUDY: XR CHEST 1 VIEW;  12/7/2023 2:23 pm   INDICATION: Signs/Symptoms:Post op cardiac surgery.   COMPARISON: 12/07/2023 at 8:55 a.m..   ACCESSION NUMBER(S): [id number]   ORDERING CLINICIAN: BREANNE YAÑEZ   FINDINGS: CARDIOMEDIASTINAL SILHOUETTE: Endotracheal tube is now seen with tip projecting approximately 4.5 cm above the inna level. NG tube is now seen extending to the stomach level with tip not visualized. [place]-H[place] catheter from right jugular approach remains in place with tubing tip again projecting at the right main pulmonary artery level. There have been interval  postoperative changes of the mediastinum with new median sternotomy wires and scattered surgical clips. Borderline size of the cardiac silhouette and mild aortic prominence is again seen, partially exaggerated by lower inspiratory volume. There is also a new cephalad directed midline probable mediastinal drain.   LUNGS: Bilateral cephalad directed chest tubes are now seen. Inspiratory volume is low. Elevation of the right hemidiaphragm again seen. New irregular regions of atelectasis and/or small infiltrates are seen in the perihilar regions bilaterally and left base. Small effusions not excluded. No appreciable pneumothorax.   ABDOMEN: No remarkable upper abdominal findings.   BONES: Bones are stable.       1.  Interval postoperative changes of the mediastinum with new life-support lines and tubes as detailed. 2. Low inspiratory volume with new irregular regions of atelectasis and or infiltrates in the perihilar regions bilaterally and left base.       MACRO: None.   Signed by: Waldo Carmichael 12/7/2023 3:09 PM Dictation workstation:   [id number]    XR chest 1 view    Result Date: 12/7/2023  Interpreted By:  Waldo Carmichael, STUDY: XR CHEST 1 VIEW;  12/7/2023 8:55 am   INDICATION: Signs/Symptoms:line placement.   COMPARISON: None.   ACCESSION NUMBER(S): [id number]   ORDERING CLINICIAN: NATHALIA TOLLIVER   FINDINGS: CARDIOMEDIASTINAL SILHOUETTE: There is a [place]-Herminia catheter from right jugular approach with tubing tip projecting near the right hilum at the right main pulmonary artery level. Cardiac silhouette is not significantly enlarged.   LUNGS: There is elevation of the right hemidiaphragm. Mild left basilar atelectasis is seen. No definite pleural effusion. No pneumothorax is seen.   ABDOMEN: No remarkable upper abdominal findings.   BONES: Multilevel endplate spurring present in the spine. Degenerative changes of the shoulders are partially visualized.       1.  [place] catheter from right jugular approach  has tubing tip projecting at the right main pulmonary artery level. 2. Mild left basilar atelectasis.       MACRO: None.   Signed by: Waldo Jany 12/7/2023 3:07 PM Dictation workstation:   [id number]    CT chest wo IV contrast    Result Date: 12/6/2023  Interpreted By:  Schoenberger, Joseph, STUDY: CT CHEST WO IV CONTRAST;  12/6/2023 11:35 am   INDICATION: Signs/Symptoms:pre-operative cabg.   COMPARISON: None.   ACCESSION NUMBER(S): [id number]   ORDERING CLINICIAN: DENICE CARTY   TECHNIQUE: Helical data acquisition of the chest was obtained  without IV contrast material.  Images were reformatted in axial, coronal, and sagittal planes.   FINDINGS: LUNGS AND AIRWAYS: The trachea and central airways are patent. No endobronchial lesion.   There is a subpleural calcified nodule in the left lower lobe consistent with prior granulomatous disease. Measures less than 5 mm. There is no pleural effusion, pneumothorax, or airspace opacity. No other significant focal lung opacities are noted.   MEDIASTINUM AND NASRIN, LOWER NECK AND AXILLA: The visualized thyroid gland is within normal limits.   No evidence of thoracic lymphadenopathy by CT criteria.   Esophagus appears normal   HEART AND VESSELS: The the thoracic aorta is normal in course and caliber. The caliber of the ascending aorta approximates 3.5 cm. There are calcifications on the aortic valve. No calcifications of the ascending aorta. Mild calcifications in the arch and descending aorta.   Main pulmonary artery and its branches are normal in caliber.   There are severe coronary atherosclerotic calcification the study is not optimized for evaluation of coronary arteries.   The cardiac chambers are not enlarged.   No evidence of pericardial effusion.   UPPER ABDOMEN: The visualized subdiaphragmatic structures demonstrate no remarkable findings.   CHEST WALL AND OSSEOUS STRUCTURES: There are no suspicious osseous lesions. Multilevel degenerative changes are  present       1.  No significant ascending aortic calcifications. See discussion above   MACRO: None   Signed by: Joseph Schoenberger 12/6/2023 12:37 PM Dictation workstation:   [id number]          Intake/Output Summary (Last 24 hours) at 12/7/2023 1412  Last data filed at 12/7/2023 1347  Gross per 24 hour   Intake 3310 ml   Output 795 ml   Net 2515 ml       Assessment/Plan:    I am currently managing this critically ill patient for the following problems:    Post Op Pain  Hx of Nicotine abuse  Hx of former IV drug use  Hx of HTN  Triple Vessel CAD s/p CABG x 3  Hyperglycemia   Post Op Respiratory Insufficiency       Neuro/Psych/Pain Ctrl/Sedation:  Post-Op Pain  Former IV drug use  Hx of Nicotine abuse   -Incisional pain  -PRN Dilaudid oxycodone, Tylenol for pain control  -once patient is alert and oriented inquire pain management preferences  -once alert and oriented- may have nicotine patch     Respiratory/ENT:  Post-Op Respiratory Insufficiency  -Hx of PPD smoking, no documented pulmonary insufficiency   -Arrived to ICU intubated, mechanically ventilated  -Continue sedation with propofol infusion, wean as tolerated, RASS goal -1 to 0  -Once NMB reversed, initiate CPAP trials, extubate when awake and CPAP trial passed  -Wean supplemental oxygen for spO2 goal > 92%  -ABG as needed  -TID duonebs  -Daily CXR    Cardiovascular:  Triple Vessel CAD s/p CABG x 3  Patient underwent on pump CABG x3 LIMA-LAD, RA-OM, SVG-PDA.   -AV wires in place, paced at   -2 pleural, 1 mediastinal chest tube, maintain to wall suction, monitor output, notify if > 100cc/hr  -Volume resuscitation postoperatively with 250cc boluses LR as needed and albumin 5% PRN  -PA in place, monitor hemodynamics  -Wean vasoactive medications to MAP 70-90, CI > 2.2  -Daily BMP, keep K >4, Mg > 2  -Start aspirin and statin POD #1  -Sarai-op Vancomycin, documented allergies to penicillin   -PT/OT consult and OOB POD #1  -CVS following and appreciate further  management      GI:  No active issues   PPI for GI prophylaxis       Renal/Volume Status (Intra & Extravascular):  Daily CMP  Keep MG >2 and K >4  Replace PRN per protocol   Hourly Is and Os  Maintain Diggs Catheter       Endocrine  Hyperglycemia  -No history of DM, likely stress induced  -Strict blood glucose control post-operatively  -Glucose goal   -Insulin gtt vs SSI per ICU protocol    Infectious Disease:  Leukocytosis 13.9, likely reactive  Trend CBC  Postoperative Vancomycin   Lactate 2.0     Heme/Onc:  Acute Blood Loss Anemia Post-Op  -Baseline Hgb 14.0  -Daily CBC  -Transfuse for Hgb goal > 7 or massive blood loss with hemodynamic instability    OBGYN/MSK:  PT and OT POD # 1    Ethics/Code Status:  Full Code     :  DVT Prophylaxis: SCDs, holding pharmacological fornow   GI Prophylaxis: PPI   Bowel Regimen: miralax, ducalox   Diet: NPO - will advance as tolerated   CVC: yes  [place]: yes  Diggs: yes  Restraints: yes, will discontinue once extubated   Dispo: ICU    Critical Care Time:  65  Plan Discussed with DR. Albert BENAVIDES, CNP  Critical Care Medicine   [facility]

## 2023-12-07 NOTE — PROGRESS NOTES
"Vancomycin Dosing by Pharmacy- INITIAL    Liban Jaramillo is a 58 y.o. year old male who Pharmacy has been consulted for vancomycin dosing for post-op surgical prophylaxis. Based on the patient's indication and renal status this patient will be dosed based on a goal AUC of 400-600.     Renal function is currently stable.    Visit Vitals  /89 (BP Location: Right arm, Patient Position: Lying)   Pulse 77   Temp 36.9 °C (98.4 °F) (Temporal)   Resp 19        Lab Results   Component Value Date    CREATININE 0.88 12/06/2023        Patient weight is No results found for: \"PTWEIGHT\"    No results found for: \"CULTURE\"     No intake/output data recorded.  [unfilled]    Lab Results   Component Value Date    PATIENTTEMP  12/07/2023      Comment:      NOTE: Patient Results are Not Corrected for Temperature    PATIENTTEMP  12/07/2023      Comment:      NOTE: Patient Results are Not Corrected for Temperature    PATIENTTEMP  12/07/2023      Comment:      NOTE: Patient Results are Not Corrected for Temperature          Assessment/Plan     Patient has already been given a loading dose of 1500 mg.  Will initiate vancomycin maintenance dose of  1250 mg every 12 hours for two post-op doses.    This dosing regimen is predicted by [employer] to result in the following pharmacokinetic parameters:    Loading dose: 1500mg given on 12/7/23 @0800  Regimen: 1250 mg IV every 12 hours.  Start time: 20:00 on 12/07/2023  Exposure target: AUC24 (range)400-600 mg/L.hr   AUC24,ss: 525 mg/L.hr  Probability of AUC24 > 400: 77 %  Ctrough,ss: 16.5 mg/L  Probability of Ctrough,ss > 20: 34 %  Probability of nephrotoxicity (Lodise SABA 2009): 12 %    Will continue to monitor renal function daily while on vancomycin and order serum creatinine at least every 48 hours if not already ordered.  Follow for continued vancomycin needs, clinical response, and signs/symptoms of toxicity.       Kristine E Steinert, [facility]       "

## 2023-12-07 NOTE — ANESTHESIA PROCEDURE NOTES
Airway  Date/Time: 12/7/2023 9:15 AM  Urgency: elective      Staffing  Performed: attending   Authorized by: Blanquita Gonzalez MD    Performed by: KENNEDY Aragon-CRNA  Patient location during procedure: OR    Indications and Patient Condition  Indications for airway management: anesthesia  Spontaneous ventilation: present  Sedation level: deep  Preoxygenated: yes  Patient position: sniffing  MILS maintained throughout  Mask difficulty assessment: 2 - vent by mask + OA or adjuvant +/- NMBA  No planned trial extubation    Final Airway Details  Final airway type: endotracheal airway      Successful airway: ETT  Cuffed: yes   Successful intubation technique: video laryngoscopy  Endotracheal tube insertion site: oral  Blade size: #4  ETT size (mm): 8.0  Cormack-Lehane Classification: grade IIa - partial view of glottis  Placement verified by: capnometry   Measured from: lips  ETT to lips (cm): 24  Number of attempts at approach: 1  Number of other approaches attempted: 0

## 2023-12-07 NOTE — ANESTHESIA PROCEDURE NOTES
PA Catheter Placement:    Date/Time: 12/7/2023 8:17 PM    A PA catheter was placed in the pre-op for the following indication(s): central venous access and CVP monitoring.    Staffing  Performed: attending   Authorized by: Blanquita Gonzalez MD    Performed by: Blanquita Gonzalez MD    Completed: patient identified, IV checked, site marked, risks and benefits discussed, surgical consent, monitors and equipment checked, pre-op evaluation and timeout performed    Procedure Information    Sterility preparation included the following: provider hand hygiene performed prior to central venous catheter insertion, all 5 sterile barriers used (gloves, gown, cap, mask, large sterile drape) during central venous catheter insertion and skin prep agent completely dried prior to procedure.      The site was prepped with ChloraPrep.  Skin prep agent completely dried prior to procedure.    The patient was placed in Trendelenburg position.     PA catheter laterality: Right  Site: internal jugular vein  PA catheter placement: distal introducer port  Catheter size: 7.5  Catheter type: oximetric  Successful placement with 1 attempt(s)      The PAC placement was confirmed by pressure tracing changes.    Post insertion care included: dressing applied.     Procedure was uneventful.

## 2023-12-07 NOTE — ANESTHESIA POSTPROCEDURE EVALUATION
Patient: Liban Jaramillo    Procedure Summary       Date: 12/07/23 Room / Location: [facility] OR 09 / [facility]    Anesthesia Start: 0854 Anesthesia Stop: 1347    Procedure: Coronary Artery Bypass x3 (LIMA-LAD, RA-OM, SVG-PDA), [employer]'s trial- may or may not have received clip, INTRAOPERATIVE BECCA, EVH LEFT RADIAL AND RIGHT LEG Diagnosis:       Coronary artery disease involving native coronary artery of native heart with unstable angina pectoris (CMS/HCC)      (Coronary artery disease involving native coronary artery of native heart with unstable angina pectoris (CMS/HCC) [I25.110])    Surgeons: Ger Murphy MD Responsible Provider: DANG Aragon    Anesthesia Type: general ASA Status: 4            Anesthesia Type: general    Vitals Value Taken Time   /64 12/07/23 1351   Temp 36 12/07/23 1351   Pulse 82 12/07/23 1350   Resp 18 12/07/23 1350   SpO2 95 % 12/07/23 1350   Vitals shown include unvalidated device data.    Anesthesia Post Evaluation    Patient location during evaluation: ICU  Patient participation: complete - patient cannot participate  Level of consciousness: sedated  Pain score: 0  Pain management: adequate  Multimodal analgesia pain management approach  Airway patency: fixed obstruction  Cardiovascular status: acceptable  Respiratory status: ventilator  Hydration status: acceptable  Postoperative Nausea and Vomiting: none  Comments: Report to ICU physicians and RN's.  Pt placed on ventilator and monitors attached        There were no known notable events for this encounter.

## 2023-12-07 NOTE — ANESTHESIA PROCEDURE NOTES
Arterial Line:    Date/Time: 12/7/2023 7:36 AM    Staffing  Performed: attending   Authorized by: Blanquita Gonzalez MD    Performed by: Blanquita Gonzalez MD    An arterial line was placed. Procedure performed using surface landmarks.in the pre-op for the following indication(s): continuous blood pressure monitoring and blood sampling needed.    A 20 gauge (size) (length), Angiocath (type) catheter was placed into the Right radial artery, secured by tape,   Seldinger technique used.  Events:  patient tolerated procedure well with no complications.

## 2023-12-08 ENCOUNTER — APPOINTMENT (OUTPATIENT)
Dept: CARDIOLOGY | Facility: HOSPITAL | Age: 58
End: 2023-12-08
Payer: COMMERCIAL

## 2023-12-08 ENCOUNTER — APPOINTMENT (OUTPATIENT)
Dept: RADIOLOGY | Facility: HOSPITAL | Age: 58
End: 2023-12-08
Payer: COMMERCIAL

## 2023-12-08 LAB
ALBUMIN SERPL BCP-MCNC: 3.9 G/DL (ref 3.4–5)
ANION GAP BLDA CALCULATED.4IONS-SCNC: 12 MMO/L (ref 10–25)
ANION GAP BLDA CALCULATED.4IONS-SCNC: 9 MMO/L (ref 10–25)
ANION GAP SERPL CALC-SCNC: 9 MMOL/L (ref 10–20)
APTT PPP: 31 SECONDS (ref 27–38)
BASE EXCESS BLDA CALC-SCNC: -1.8 MMOL/L (ref -2–3)
BASE EXCESS BLDA CALC-SCNC: -2 MMOL/L (ref -2–3)
BODY TEMPERATURE: ABNORMAL
BODY TEMPERATURE: ABNORMAL
BUN SERPL-MCNC: 9 MG/DL (ref 6–23)
CA-I BLDA-SCNC: 1.11 MMOL/L (ref 1.1–1.33)
CA-I BLDA-SCNC: 1.13 MMOL/L (ref 1.1–1.33)
CALCIUM SERPL-MCNC: 8.2 MG/DL (ref 8.6–10.3)
CHLORIDE BLDA-SCNC: 104 MMOL/L (ref 98–107)
CHLORIDE BLDA-SCNC: 104 MMOL/L (ref 98–107)
CHLORIDE SERPL-SCNC: 105 MMOL/L (ref 98–107)
CO2 SERPL-SCNC: 24 MMOL/L (ref 21–32)
CREAT SERPL-MCNC: 0.74 MG/DL (ref 0.5–1.3)
ERYTHROCYTE [DISTWIDTH] IN BLOOD BY AUTOMATED COUNT: 13.9 % (ref 11.5–14.5)
GFR SERPL CREATININE-BSD FRML MDRD: >90 ML/MIN/1.73M*2
GLUCOSE BLD MANUAL STRIP-MCNC: 112 MG/DL (ref 74–99)
GLUCOSE BLD MANUAL STRIP-MCNC: 119 MG/DL (ref 74–99)
GLUCOSE BLD MANUAL STRIP-MCNC: 128 MG/DL (ref 74–99)
GLUCOSE BLD MANUAL STRIP-MCNC: 130 MG/DL (ref 74–99)
GLUCOSE BLD MANUAL STRIP-MCNC: 138 MG/DL (ref 74–99)
GLUCOSE BLD MANUAL STRIP-MCNC: 140 MG/DL (ref 74–99)
GLUCOSE BLDA-MCNC: 128 MG/DL (ref 74–99)
GLUCOSE BLDA-MCNC: 132 MG/DL (ref 74–99)
GLUCOSE SERPL-MCNC: 126 MG/DL (ref 74–99)
HCO3 BLDA-SCNC: 23.7 MMOL/L (ref 22–26)
HCO3 BLDA-SCNC: 23.7 MMOL/L (ref 22–26)
HCT VFR BLD AUTO: 38.5 % (ref 41–52)
HCT VFR BLD EST: 38 % (ref 41–52)
HCT VFR BLD EST: 39 % (ref 41–52)
HGB BLD-MCNC: 12.6 G/DL (ref 13.5–17.5)
HGB BLDA-MCNC: 12.6 G/DL (ref 13.5–17.5)
HGB BLDA-MCNC: 13.1 G/DL (ref 13.5–17.5)
INHALED O2 CONCENTRATION: 100 %
INHALED O2 CONCENTRATION: 80 %
INR PPP: 1.2 (ref 0.9–1.1)
LACTATE BLDA-SCNC: 1.3 MMOL/L (ref 0.4–2)
LACTATE BLDA-SCNC: 1.4 MMOL/L (ref 0.4–2)
MAGNESIUM SERPL-MCNC: 2.04 MG/DL (ref 1.6–2.4)
MCH RBC QN AUTO: 30.3 PG (ref 26–34)
MCHC RBC AUTO-ENTMCNC: 32.7 G/DL (ref 32–36)
MCV RBC AUTO: 93 FL (ref 80–100)
NRBC BLD-RTO: 0 /100 WBCS (ref 0–0)
OXYHGB MFR BLDA: 88.3 % (ref 94–98)
OXYHGB MFR BLDA: 90.6 % (ref 94–98)
PCO2 BLDA: 42 MM HG (ref 38–42)
PCO2 BLDA: 43 MM HG (ref 38–42)
PH BLDA: 7.35 PH (ref 7.38–7.42)
PH BLDA: 7.36 PH (ref 7.38–7.42)
PHOSPHATE SERPL-MCNC: 3.2 MG/DL (ref 2.5–4.9)
PLATELET # BLD AUTO: 163 X10*3/UL (ref 150–450)
PO2 BLDA: 62 MM HG (ref 85–95)
PO2 BLDA: 71 MM HG (ref 85–95)
POTASSIUM BLDA-SCNC: 4.5 MMOL/L (ref 3.5–5.3)
POTASSIUM BLDA-SCNC: 4.6 MMOL/L (ref 3.5–5.3)
POTASSIUM SERPL-SCNC: 4.3 MMOL/L (ref 3.5–5.3)
PROTHROMBIN TIME: 13.5 SECONDS (ref 9.8–12.8)
RBC # BLD AUTO: 4.16 X10*6/UL (ref 4.5–5.9)
SAO2 % BLDA: 90 % (ref 94–100)
SAO2 % BLDA: 92 % (ref 94–100)
SODIUM BLDA-SCNC: 132 MMOL/L (ref 136–145)
SODIUM BLDA-SCNC: 135 MMOL/L (ref 136–145)
SODIUM SERPL-SCNC: 134 MMOL/L (ref 136–145)
WBC # BLD AUTO: 10.4 X10*3/UL (ref 4.4–11.3)

## 2023-12-08 PROCEDURE — 97165 OT EVAL LOW COMPLEX 30 MIN: CPT | Mod: GO

## 2023-12-08 PROCEDURE — 2500000004 HC RX 250 GENERAL PHARMACY W/ HCPCS (ALT 636 FOR OP/ED)

## 2023-12-08 PROCEDURE — 85018 HEMOGLOBIN: CPT

## 2023-12-08 PROCEDURE — 99233 SBSQ HOSP IP/OBS HIGH 50: CPT | Performed by: NURSE PRACTITIONER

## 2023-12-08 PROCEDURE — 96372 THER/PROPH/DIAG INJ SC/IM: CPT

## 2023-12-08 PROCEDURE — 2500000002 HC RX 250 W HCPCS SELF ADMINISTERED DRUGS (ALT 637 FOR MEDICARE OP, ALT 636 FOR OP/ED)

## 2023-12-08 PROCEDURE — 85027 COMPLETE CBC AUTOMATED: CPT

## 2023-12-08 PROCEDURE — 2500000001 HC RX 250 WO HCPCS SELF ADMINISTERED DRUGS (ALT 637 FOR MEDICARE OP): Performed by: EMERGENCY MEDICINE

## 2023-12-08 PROCEDURE — 82947 ASSAY GLUCOSE BLOOD QUANT: CPT

## 2023-12-08 PROCEDURE — 84132 ASSAY OF SERUM POTASSIUM: CPT

## 2023-12-08 PROCEDURE — 85610 PROTHROMBIN TIME: CPT

## 2023-12-08 PROCEDURE — 71045 X-RAY EXAM CHEST 1 VIEW: CPT

## 2023-12-08 PROCEDURE — 71045 X-RAY EXAM CHEST 1 VIEW: CPT | Performed by: RADIOLOGY

## 2023-12-08 PROCEDURE — 83735 ASSAY OF MAGNESIUM: CPT

## 2023-12-08 PROCEDURE — 70540 MRI ORBIT/FACE/NECK W/O DYE: CPT

## 2023-12-08 PROCEDURE — 94640 AIRWAY INHALATION TREATMENT: CPT

## 2023-12-08 PROCEDURE — 2500000004 HC RX 250 GENERAL PHARMACY W/ HCPCS (ALT 636 FOR OP/ED): Performed by: EMERGENCY MEDICINE

## 2023-12-08 PROCEDURE — 94762 N-INVAS EAR/PLS OXIMTRY CONT: CPT

## 2023-12-08 PROCEDURE — 2500000005 HC RX 250 GENERAL PHARMACY W/O HCPCS

## 2023-12-08 PROCEDURE — 93005 ELECTROCARDIOGRAM TRACING: CPT

## 2023-12-08 PROCEDURE — 2500000001 HC RX 250 WO HCPCS SELF ADMINISTERED DRUGS (ALT 637 FOR MEDICARE OP)

## 2023-12-08 PROCEDURE — 70551 MRI BRAIN STEM W/O DYE: CPT | Performed by: STUDENT IN AN ORGANIZED HEALTH CARE EDUCATION/TRAINING PROGRAM

## 2023-12-08 PROCEDURE — 2500000005 HC RX 250 GENERAL PHARMACY W/O HCPCS: Performed by: EMERGENCY MEDICINE

## 2023-12-08 PROCEDURE — 2020000001 HC ICU ROOM DAILY

## 2023-12-08 PROCEDURE — 70540 MRI ORBIT/FACE/NECK W/O DYE: CPT | Performed by: STUDENT IN AN ORGANIZED HEALTH CARE EDUCATION/TRAINING PROGRAM

## 2023-12-08 PROCEDURE — 37799 UNLISTED PX VASCULAR SURGERY: CPT

## 2023-12-08 PROCEDURE — 99291 CRITICAL CARE FIRST HOUR: CPT

## 2023-12-08 PROCEDURE — 94660 CPAP INITIATION&MGMT: CPT

## 2023-12-08 PROCEDURE — 97162 PT EVAL MOD COMPLEX 30 MIN: CPT | Mod: GP | Performed by: PHYSICAL THERAPIST

## 2023-12-08 PROCEDURE — 70551 MRI BRAIN STEM W/O DYE: CPT

## 2023-12-08 PROCEDURE — 82435 ASSAY OF BLOOD CHLORIDE: CPT

## 2023-12-08 PROCEDURE — 93010 ELECTROCARDIOGRAM REPORT: CPT | Performed by: INTERNAL MEDICINE

## 2023-12-08 RX ORDER — ACETYLCYSTEINE 200 MG/ML
3 SOLUTION ORAL; RESPIRATORY (INHALATION)
Status: DISCONTINUED | OUTPATIENT
Start: 2023-12-08 | End: 2023-12-11

## 2023-12-08 RX ORDER — ORPHENADRINE CITRATE 30 MG/ML
60 INJECTION INTRAMUSCULAR; INTRAVENOUS EVERY 12 HOURS SCHEDULED
Status: DISCONTINUED | OUTPATIENT
Start: 2023-12-08 | End: 2023-12-08

## 2023-12-08 RX ORDER — ACETAMINOPHEN 10 MG/ML
1000 INJECTION, SOLUTION INTRAVENOUS EVERY 6 HOURS SCHEDULED
Status: DISCONTINUED | OUTPATIENT
Start: 2023-12-08 | End: 2023-12-09

## 2023-12-08 RX ORDER — LIDOCAINE 50 MG/G
1 PATCH TOPICAL DAILY
Status: DISCONTINUED | OUTPATIENT
Start: 2023-12-08 | End: 2023-12-08

## 2023-12-08 RX ORDER — IBUPROFEN 200 MG
1 TABLET ORAL DAILY
Status: DISCONTINUED | OUTPATIENT
Start: 2023-12-08 | End: 2023-12-11 | Stop reason: HOSPADM

## 2023-12-08 RX ORDER — FUROSEMIDE 10 MG/ML
20 INJECTION INTRAMUSCULAR; INTRAVENOUS ONCE
Status: COMPLETED | OUTPATIENT
Start: 2023-12-08 | End: 2023-12-08

## 2023-12-08 RX ORDER — LABETALOL HYDROCHLORIDE 5 MG/ML
10 INJECTION, SOLUTION INTRAVENOUS EVERY 6 HOURS PRN
Status: DISCONTINUED | OUTPATIENT
Start: 2023-12-08 | End: 2023-12-11 | Stop reason: HOSPADM

## 2023-12-08 RX ORDER — FUROSEMIDE 10 MG/ML
20 INJECTION INTRAMUSCULAR; INTRAVENOUS ONCE
Status: DISCONTINUED | OUTPATIENT
Start: 2023-12-08 | End: 2023-12-08

## 2023-12-08 RX ORDER — METOPROLOL TARTRATE 50 MG/1
50 TABLET ORAL 2 TIMES DAILY
Status: DISCONTINUED | OUTPATIENT
Start: 2023-12-08 | End: 2023-12-11 | Stop reason: HOSPADM

## 2023-12-08 RX ORDER — OXYCODONE HYDROCHLORIDE 5 MG/1
10 TABLET ORAL EVERY 4 HOURS PRN
Status: DISCONTINUED | OUTPATIENT
Start: 2023-12-08 | End: 2023-12-10

## 2023-12-08 RX ORDER — ACETYLCYSTEINE 200 MG/ML
3 SOLUTION ORAL; RESPIRATORY (INHALATION)
Status: DISCONTINUED | OUTPATIENT
Start: 2023-12-08 | End: 2023-12-08

## 2023-12-08 RX ORDER — ENOXAPARIN SODIUM 100 MG/ML
40 INJECTION SUBCUTANEOUS DAILY
Status: DISCONTINUED | OUTPATIENT
Start: 2023-12-08 | End: 2023-12-08

## 2023-12-08 RX ORDER — KETOROLAC TROMETHAMINE 30 MG/ML
15 INJECTION, SOLUTION INTRAMUSCULAR; INTRAVENOUS ONCE
Status: COMPLETED | OUTPATIENT
Start: 2023-12-08 | End: 2023-12-08

## 2023-12-08 RX ORDER — HEPARIN SODIUM 5000 [USP'U]/ML
5000 INJECTION, SOLUTION INTRAVENOUS; SUBCUTANEOUS EVERY 8 HOURS
Status: DISCONTINUED | OUTPATIENT
Start: 2023-12-08 | End: 2023-12-11 | Stop reason: HOSPADM

## 2023-12-08 RX ORDER — NAPROXEN SODIUM 220 MG/1
81 TABLET, FILM COATED ORAL DAILY
Status: DISCONTINUED | OUTPATIENT
Start: 2023-12-08 | End: 2023-12-11 | Stop reason: HOSPADM

## 2023-12-08 RX ORDER — ISOSORBIDE MONONITRATE 30 MG/1
30 TABLET, EXTENDED RELEASE ORAL DAILY
Status: DISCONTINUED | OUTPATIENT
Start: 2023-12-08 | End: 2023-12-11 | Stop reason: HOSPADM

## 2023-12-08 RX ORDER — GABAPENTIN 300 MG/1
300 CAPSULE ORAL EVERY 8 HOURS SCHEDULED
Status: DISCONTINUED | OUTPATIENT
Start: 2023-12-08 | End: 2023-12-11 | Stop reason: HOSPADM

## 2023-12-08 RX ORDER — LIDOCAINE 560 MG/1
1 PATCH PERCUTANEOUS; TOPICAL; TRANSDERMAL DAILY
Status: DISCONTINUED | OUTPATIENT
Start: 2023-12-08 | End: 2023-12-11 | Stop reason: HOSPADM

## 2023-12-08 RX ADMIN — OXYCODONE HYDROCHLORIDE 5 MG: 5 TABLET ORAL at 00:24

## 2023-12-08 RX ADMIN — ORPHENADRINE CITRATE 60 MG: 60 INJECTION INTRAMUSCULAR; INTRAVENOUS at 12:58

## 2023-12-08 RX ADMIN — IPRATROPIUM BROMIDE AND ALBUTEROL SULFATE 3 ML: 2.5; .5 SOLUTION RESPIRATORY (INHALATION) at 07:04

## 2023-12-08 RX ADMIN — NICARDIPINE HYDROCHLORIDE 7.5 MG/HR: 0.2 INJECTION, SOLUTION INTRAVENOUS at 02:58

## 2023-12-08 RX ADMIN — METOPROLOL TARTRATE 50 MG: 50 TABLET, FILM COATED ORAL at 20:13

## 2023-12-08 RX ADMIN — ATORVASTATIN CALCIUM 80 MG: 80 TABLET, FILM COATED ORAL at 20:13

## 2023-12-08 RX ADMIN — GABAPENTIN 300 MG: 300 CAPSULE ORAL at 20:13

## 2023-12-08 RX ADMIN — OXYCODONE HYDROCHLORIDE 5 MG: 5 TABLET ORAL at 05:46

## 2023-12-08 RX ADMIN — HEPARIN SODIUM 5000 UNITS: 5000 INJECTION INTRAVENOUS; SUBCUTANEOUS at 12:58

## 2023-12-08 RX ADMIN — HEPARIN SODIUM 5000 UNITS: 5000 INJECTION INTRAVENOUS; SUBCUTANEOUS at 20:13

## 2023-12-08 RX ADMIN — IPRATROPIUM BROMIDE AND ALBUTEROL SULFATE 3 ML: 2.5; .5 SOLUTION RESPIRATORY (INHALATION) at 20:30

## 2023-12-08 RX ADMIN — POLYETHYLENE GLYCOL 3350 17 G: 17 POWDER, FOR SOLUTION ORAL at 09:21

## 2023-12-08 RX ADMIN — ACETAMINOPHEN 1000 MG: 10 INJECTION, SOLUTION INTRAVENOUS at 09:32

## 2023-12-08 RX ADMIN — ISOSORBIDE MONONITRATE 30 MG: 30 TABLET, EXTENDED RELEASE ORAL at 09:21

## 2023-12-08 RX ADMIN — HYDROMORPHONE HYDROCHLORIDE 0.5 MG: 1 INJECTION, SOLUTION INTRAMUSCULAR; INTRAVENOUS; SUBCUTANEOUS at 00:31

## 2023-12-08 RX ADMIN — KETOROLAC TROMETHAMINE 15 MG: 30 INJECTION, SOLUTION INTRAMUSCULAR at 01:56

## 2023-12-08 RX ADMIN — PANTOPRAZOLE SODIUM 40 MG: 40 TABLET, DELAYED RELEASE ORAL at 05:46

## 2023-12-08 RX ADMIN — IPRATROPIUM BROMIDE AND ALBUTEROL SULFATE 3 ML: 2.5; .5 SOLUTION RESPIRATORY (INHALATION) at 14:00

## 2023-12-08 RX ADMIN — LIDOCAINE 1 PATCH: 4 PATCH TOPICAL at 12:57

## 2023-12-08 RX ADMIN — SODIUM CHLORIDE, POTASSIUM CHLORIDE, SODIUM LACTATE AND CALCIUM CHLORIDE 50 ML/HR: 600; 310; 30; 20 INJECTION, SOLUTION INTRAVENOUS at 01:55

## 2023-12-08 RX ADMIN — OXYCODONE HYDROCHLORIDE 5 MG: 5 TABLET ORAL at 10:19

## 2023-12-08 RX ADMIN — LEVOTHYROXINE SODIUM 88 MCG: 0.09 TABLET ORAL at 05:46

## 2023-12-08 RX ADMIN — VANCOMYCIN HYDROCHLORIDE 1250 MG: 1.25 INJECTION, POWDER, LYOPHILIZED, FOR SOLUTION INTRAVENOUS at 09:55

## 2023-12-08 RX ADMIN — METOPROLOL TARTRATE 50 MG: 50 TABLET, FILM COATED ORAL at 09:22

## 2023-12-08 RX ADMIN — ACETYLCYSTEINE 600 MG: 200 SOLUTION ORAL; RESPIRATORY (INHALATION) at 14:01

## 2023-12-08 RX ADMIN — ACETAMINOPHEN 650 MG: 325 TABLET ORAL at 00:24

## 2023-12-08 RX ADMIN — FUROSEMIDE 20 MG: 10 INJECTION, SOLUTION INTRAMUSCULAR; INTRAVENOUS at 06:35

## 2023-12-08 RX ADMIN — ACETYLCYSTEINE 3 ML: 200 SOLUTION ORAL; RESPIRATORY (INHALATION) at 20:31

## 2023-12-08 RX ADMIN — ASPIRIN 81 MG: 81 TABLET, CHEWABLE ORAL at 09:21

## 2023-12-08 RX ADMIN — Medication: at 06:10

## 2023-12-08 RX ADMIN — LABETALOL HYDROCHLORIDE 10 MG: 5 INJECTION, SOLUTION INTRAVENOUS at 23:35

## 2023-12-08 RX ADMIN — HYDROMORPHONE HYDROCHLORIDE 0.5 MG: 1 INJECTION, SOLUTION INTRAMUSCULAR; INTRAVENOUS; SUBCUTANEOUS at 03:22

## 2023-12-08 RX ADMIN — ACETAMINOPHEN 650 MG: 325 TABLET ORAL at 05:46

## 2023-12-08 SDOH — SOCIAL STABILITY: SOCIAL INSECURITY: WERE YOU ABLE TO COMPLETE ALL THE BEHAVIORAL HEALTH SCREENINGS?: YES

## 2023-12-08 SDOH — SOCIAL STABILITY: SOCIAL INSECURITY: HAS ANYONE EVER THREATENED TO HURT YOUR FAMILY OR YOUR PETS?: NO

## 2023-12-08 SDOH — SOCIAL STABILITY: SOCIAL INSECURITY: DO YOU FEEL ANYONE HAS EXPLOITED OR TAKEN ADVANTAGE OF YOU FINANCIALLY OR OF YOUR PERSONAL PROPERTY?: NO

## 2023-12-08 SDOH — SOCIAL STABILITY: SOCIAL INSECURITY: ARE YOU OR HAVE YOU BEEN THREATENED OR ABUSED PHYSICALLY, EMOTIONALLY, OR SEXUALLY BY ANYONE?: NO

## 2023-12-08 SDOH — SOCIAL STABILITY: SOCIAL INSECURITY: DO YOU FEEL UNSAFE GOING BACK TO THE PLACE WHERE YOU ARE LIVING?: NO

## 2023-12-08 SDOH — SOCIAL STABILITY: SOCIAL INSECURITY: ARE THERE ANY APPARENT SIGNS OF INJURIES/BEHAVIORS THAT COULD BE RELATED TO ABUSE/NEGLECT?: NO

## 2023-12-08 SDOH — SOCIAL STABILITY: SOCIAL INSECURITY: ABUSE: ADULT

## 2023-12-08 SDOH — SOCIAL STABILITY: SOCIAL INSECURITY: HAVE YOU HAD THOUGHTS OF HARMING ANYONE ELSE?: NO

## 2023-12-08 SDOH — SOCIAL STABILITY: SOCIAL INSECURITY: DOES ANYONE TRY TO KEEP YOU FROM HAVING/CONTACTING OTHER FRIENDS OR DOING THINGS OUTSIDE YOUR HOME?: NO

## 2023-12-08 ASSESSMENT — LIFESTYLE VARIABLES
AUDIT-C TOTAL SCORE: 0
HOW OFTEN DO YOU HAVE 6 OR MORE DRINKS ON ONE OCCASION: NEVER
HOW OFTEN DO YOU HAVE A DRINK CONTAINING ALCOHOL: NEVER
HOW MANY STANDARD DRINKS CONTAINING ALCOHOL DO YOU HAVE ON A TYPICAL DAY: PATIENT DOES NOT DRINK
SKIP TO QUESTIONS 9-10: 1
AUDIT-C TOTAL SCORE: 0

## 2023-12-08 ASSESSMENT — COGNITIVE AND FUNCTIONAL STATUS - GENERAL
TURNING FROM BACK TO SIDE WHILE IN FLAT BAD: A LITTLE
WALKING IN HOSPITAL ROOM: A LOT
MOVING FROM LYING ON BACK TO SITTING ON SIDE OF FLAT BED WITH BEDRAILS: A LITTLE
MOVING FROM LYING ON BACK TO SITTING ON SIDE OF FLAT BED WITH BEDRAILS: A LOT
DRESSING REGULAR LOWER BODY CLOTHING: A LOT
WALKING IN HOSPITAL ROOM: A LITTLE
MOVING TO AND FROM BED TO CHAIR: A LITTLE
HELP NEEDED FOR BATHING: A LOT
CLIMB 3 TO 5 STEPS WITH RAILING: TOTAL
MOBILITY SCORE: 11
TOILETING: A LOT
STANDING UP FROM CHAIR USING ARMS: A LOT
MOVING TO AND FROM BED TO CHAIR: A LOT
CLIMB 3 TO 5 STEPS WITH RAILING: A LOT
DAILY ACTIVITIY SCORE: 15
STANDING UP FROM CHAIR USING ARMS: A LITTLE
MOBILITY SCORE: 17
DRESSING REGULAR UPPER BODY CLOTHING: A LOT
PERSONAL GROOMING: A LITTLE
TURNING FROM BACK TO SIDE WHILE IN FLAT BAD: A LOT

## 2023-12-08 ASSESSMENT — PAIN SCALES - GENERAL
PAINLEVEL_OUTOF10: 5 - MODERATE PAIN
PAINLEVEL_OUTOF10: 7
PAINLEVEL_OUTOF10: 4
PAINLEVEL_OUTOF10: 0 - NO PAIN
PAINLEVEL_OUTOF10: 6
PAINLEVEL_OUTOF10: 8
PAINLEVEL_OUTOF10: 9
PAINLEVEL_OUTOF10: 5 - MODERATE PAIN
PAINLEVEL_OUTOF10: 9
PAINLEVEL_OUTOF10: 8
PAINLEVEL_OUTOF10: 0 - NO PAIN
PAINLEVEL_OUTOF10: 7
PAINLEVEL_OUTOF10: 8

## 2023-12-08 ASSESSMENT — ACTIVITIES OF DAILY LIVING (ADL)
ADEQUATE_TO_COMPLETE_ADL: YES
HEARING - LEFT EAR: FUNCTIONAL
BATHING_ASSISTANCE: MODERATE
HEARING - RIGHT EAR: FUNCTIONAL
BATHING: INDEPENDENT
TOILETING: INDEPENDENT
WALKS IN HOME: INDEPENDENT
DRESSING YOURSELF: INDEPENDENT
GROOMING: INDEPENDENT
PATIENT'S MEMORY ADEQUATE TO SAFELY COMPLETE DAILY ACTIVITIES?: YES
JUDGMENT_ADEQUATE_SAFELY_COMPLETE_DAILY_ACTIVITIES: YES
FEEDING YOURSELF: INDEPENDENT

## 2023-12-08 ASSESSMENT — PAIN - FUNCTIONAL ASSESSMENT
PAIN_FUNCTIONAL_ASSESSMENT: 0-10

## 2023-12-08 ASSESSMENT — PAIN DESCRIPTION - ORIENTATION
ORIENTATION: LOWER
ORIENTATION: MID

## 2023-12-08 ASSESSMENT — PATIENT HEALTH QUESTIONNAIRE - PHQ9
2. FEELING DOWN, DEPRESSED OR HOPELESS: NOT AT ALL
1. LITTLE INTEREST OR PLEASURE IN DOING THINGS: NOT AT ALL
SUM OF ALL RESPONSES TO PHQ9 QUESTIONS 1 & 2: 0

## 2023-12-08 ASSESSMENT — PAIN DESCRIPTION - LOCATION
LOCATION: BACK
LOCATION: BACK

## 2023-12-08 NOTE — NURSING NOTE
1700: pt taken for walk around unit, tolerated well, but c/o double vision. Pt observed w/ L eye drifting laterally to the left. Dr Price at bedside to assess pt and ordering stat MRI; mri screening form done, and mri alerted to order. CV surgery team and Dr Cheryl Murphy notified

## 2023-12-08 NOTE — PROGRESS NOTES
[facility] Critical Care Medicine       Date:  12/8/2023  Patient:  Liban Jaramillo  YOB: 1965  MRN:  [id number]   Admit Date:  12/7/2023  ========================================================================================================    S/P On Pump CABG x 3         History of Present Illness:  Liban Jaramillo is a 58 y.o. year old male patient with Past Medical History of hypertension, hyperlipidemia, hypothyroidism, tobacco use and CAD.  He was referred by cardiology for coronary bypass grafting. He initially presented to ED on 10/21/23 endorsing intermittent mid sternal chest pain unrelieved by nitroglycerin. He underwent cardiac catheterization that showed 90% stenosis in LAD, 95% stenosis in left circumflex as well as 50% stenosis in left main. Right coronary artery was without disease and LVEF was normal at 60%.  On 12/7, patient underwent on pump x 3 CABG, LIMA-LAD, RA-OM, SVG-PDA.           Interval ICU Events:  On 12/7, patient underwent on pump x 3 CABG, LIMA-LAD, RA-OM, SVG-PDA Cross clamp time was 57 min, on pump for 69 min. He returned to ICU in stable postoperative condition with Ventricular wires, 1 mediastinal chest tube, 1 R pleural chest tube and 1 left pleural chest tube, intubated and sedation on propofol. On arrival to ICU, pt was given 200 mcg of push dose epi for hypotension with great response.     12/8: POD #1CABG x 3. Patient is afebrile and hemodynamically stable (nicardipine was discontinued early this am) HR is NSR with rates in the 80s with no ectopy. He is 93% SP02 on HFNC. Pt was endorsing productive cough that is difficult to get up, given mucomyst this am. He states that pain is well controlled however he is insistent that Montes and chest tubes come out. He was told after ambulating today - okay to remove chest tubes and montes.       Medical History:  Past Medical History:   Diagnosis Date    Anxiety     Arthritis     Cervical disc disease     COPD (chronic obstructive  "pulmonary disease) (CMS/[facility])     Coronary artery disease     Diabetes mellitus (CMS/[facility])     \"diet controlled\" and is not testing his glucose    Disease of thyroid gland     GERD (gastroesophageal reflux disease)     Hyperlipidemia     Hypertension     Myocardial infarction (CMS/[facility])     Nephrolithiasis     PTSD (post-traumatic stress disorder)     Skin disorder     states rash on occassion    Wears glasses     Clay teeth extracted      Past Surgical History:   Procedure Laterality Date    CARDIAC CATHETERIZATION      HERNIA REPAIR      TONSILLECTOMY      WISDOM TOOTH EXTRACTION       Medications Prior to Admission   Medication Sig Dispense Refill Last Dose    aspirin 81 mg EC tablet Take 1 tablet (81 mg) by mouth once daily in the morning. Take before meals. 90 tablet 1 Past Week    atorvastatin (Lipitor) 80 mg tablet Take 1 tablet (80 mg) by mouth once daily. 90 tablet 1 Past Week    isosorbide mononitrate ER (Imdur) 30 mg 24 hr tablet Take 1 tablet (30 mg) by mouth once daily. 90 tablet 1 Past Week    levothyroxine (Synthroid, Levoxyl) 88 mcg tablet Take 1 tablet (88 mcg) by mouth once daily. 60 tablet 0 Past Week    lisinopril 20 mg tablet Take 1 tablet (20 mg) by mouth once daily. 90 tablet 1 Past Week    metoprolol tartrate (Lopressor) 50 mg tablet Take 1 tablet by mouth 2 times a day. (Patient taking differently: Take 1 tablet by mouth once daily. 12/4/23 patient states he takes 50 mg daily in the morning) 180 tablet 1 Past Week    mupirocin (Bactroban) 2 % ointment As directed   12/7/2023 at 0600    nitroglycerin (Nitrostat) 0.4 mg SL tablet Place 1 tablet (0.4 mg) under the tongue every 5 minutes if needed for chest pain. 90 tablet 1 Past Week    ranolazine (Ranexa) 500 mg 12 hr tablet Take 1 tablet (500 mg) by mouth 2 times a day. 180 tablet 1 Past Week    Advair HFA 45-21 mcg/actuation inhaler Inhale 2 puffs 2 times a day as needed. States he uses it PRN   More than a month    blood sugar diagnostic " strip 1 each once daily. 100 strip 3 Unknown    FreeStyle glucose monitoring kit 1 each 3 times a day as needed (for low blood sugar symptoms). 1 kit 0 Unknown    lancets misc 1 each once daily. 100 each 3 Unknown at f     Penicillins  Social History     Tobacco Use    Smoking status: Every Day     Packs/day: 0.50     Years: 46.00     Additional pack years: 0.00     Total pack years: 23.00     Types: Cigarettes    Smokeless tobacco: Never   Vaping Use    Vaping Use: Never used   Substance Use Topics    Alcohol use: Yes     Alcohol/week: 3.0 standard drinks of alcohol     Types: 3 Cans of beer per week    Drug use: Not Currently     Comment: before everything     Family History   Problem Relation Name Age of Onset    Diabetes Mother      Cancer Mother      Diabetes Father      Liver disease Father      Esophageal cancer Brother      Throat cancer Maternal Grandfather         [facility] Medications:        Current Facility-Administered Medications:     acetaminophen (Ofirmev) injection 1,000 mg, 1,000 mg, intravenous, q6h [facility], Waldo Price DO    acetylcysteine (Mucomyst) 200 mg/mL (20 %) nebulizer solution 600 mg, 3 mL, nebulization, 4x daily, TAMEKA Abreu    aspirin chewable tablet 81 mg, 81 mg, oral, Daily, Waldo Price DO    atorvastatin (Lipitor) tablet 80 mg, 80 mg, oral, Nightly, TAMEKA Abreu    bisacodyl (Dulcolax) EC tablet 10 mg, 10 mg, oral, Daily PRN, TAMEKA Abreu    dextrose 50 % injection 25 g, 25 g, intravenous, q15 min PRN **OR** glucagon (Glucagen) injection 1 mg, 1 mg, intramuscular, q15 min PRN, TAMEKA Zelaya    fluticasone propion-salmeteroL (Advair) 45-21 mcg/actuation inhaler 2 puff, 2 puff, inhalation, BID, TAMEKA Abreu    HYDROmorphone (Dilaudid) injection 0.4 mg, 0.4 mg, intravenous, q2h PRN, Waldo Price DO    insulin lispro (HumaLOG) injection 0-15 Units, 0-15 Units, subcutaneous, q4h, Paras Lewis,  APRN-CNP    ipratropium-albuteroL (Duo-Neb) 0.5-2.5 mg/3 mL nebulizer solution 3 mL, 3 mL, nebulization, TID, TAMEKA Abreu, 3 mL at 12/08/23 0704    isosorbide mononitrate ER (Imdur) 24 hr tablet 30 mg, 30 mg, oral, Daily, Waldo Price DO    levothyroxine (Synthroid, Levoxyl) tablet 88 mcg, 88 mcg, oral, Daily, KENNEDY Abreu-CNP, 88 mcg at 12/08/23 0546    magnesium sulfate IV 2 g, 2 g, intravenous, q6h PRN, KENNEDY Abreu-CNP    magnesium sulfate IV 4 g, 4 g, intravenous, q6h PRN, KENNEDY Abreu-CNP    metoprolol tartrate (Lopressor) tablet 50 mg, 50 mg, oral, BID, Waldo Price DO    naloxone (Narcan) injection 0.2 mg, 0.2 mg, intravenous, q5 min PRN, TAMEKA Abreu    niCARdipine (Cardene) 40 mg in sodium chloride 200 mL (0.2 mg/mL) infusion (premix), 2.5-15 mg/hr, intravenous, Continuous, TAMEKA Zelaya, Stopped at 12/08/23 0640    ondansetron (Zofran) tablet 4 mg, 4 mg, oral, q8h PRN **OR** ondansetron (Zofran) injection 4 mg, 4 mg, intravenous, q8h PRN, TAMEKA Abreu    orphenadrine (Norflex) injection 60 mg, 60 mg, intravenous, q12h BRANDON, Waldo Price DO    oxyCODONE (Roxicodone) immediate release tablet 10 mg, 10 mg, oral, q4h PRN, Waldo Price DO    oxyCODONE (Roxicodone) immediate release tablet 5 mg, 5 mg, oral, q4h PRN, TAMEKA Abreu, 5 mg at 12/08/23 0546    oxygen (O2) therapy, , inhalation, Continuous PRN - O2/gases, TAMEKA Zelaya    oxygen (O2) therapy, , inhalation, Continuous PRN - O2/gases, ATMEKA Zelaya, 8 L/min at 12/08/23 0704    pantoprazole (ProtoNix) EC tablet 40 mg, 40 mg, oral, Daily before breakfast, 40 mg at 12/08/23 0546 **OR** pantoprazole (ProtoNix) injection 40 mg, 40 mg, intravenous, Daily before breakfast, TAMEKA Abreu, 40 mg at 12/07/23 1522    polyethylene glycol (Glycolax, Miralax) packet 17 g, 17 g, oral, Daily, Neetu EDWARDS  Cesia, APRN-CNP    potassium chloride CR (Klor-Con M20) ER tablet 20 mEq, 20 mEq, oral, q6h PRN **OR** potassium chloride (Klor-Con) packet 20 mEq, 20 mEq, oral, q6h PRN, Neetu Callaway, APRN-CNP    potassium chloride CR (Klor-Con M20) ER tablet 40 mEq, 40 mEq, oral, q6h PRN **OR** potassium chloride (Klor-Con) packet 40 mEq, 40 mEq, oral, q6h PRN, Neetu Callaway, APRN-CNP    potassium chloride 20 mEq in 100 mL IV premix, 20 mEq, intravenous, q6h PRN, Neetu R Callaway, APRN-CNP    potassium chloride 40 mEq in 100 mL IV premix, 40 mEq, intravenous, q6h PRN, Neetuthelma Callaway, APRN-CNP    vancomycin (Vancocin) in dextrose 5 % water (D5W) 250 mL IV 1,250 mg, 1,250 mg, intravenous, q12h, KENNEDY Abreu-CNP, Stopped at 12/07/23 2113    Review of Systems:  14 point review of systems was completed and negative except for those specially mention in my HPI    Physical Exam:    Heart Rate:  [76-92]   Temp:  [35.8 °C (96.4 °F)-37.3 °C (99.1 °F)]   Resp:  [15-36]   Weight:  [97.8 kg (215 lb 9.8 oz)]   SpO2:  [83 %-100 %]     Physical Exam  Vitals and nursing note reviewed.   Constitutional:       Appearance: Normal appearance. He is well-developed.      Interventions: Nasal cannula in place.   HENT:      Head: Normocephalic and atraumatic.   Neck:      Comments: Eagar and cordis in place, will plan to remove today   Cardiovascular:      Rate and Rhythm: Normal rate and regular rhythm.      Pulses: Normal pulses.      Heart sounds: Normal heart sounds.   Pulmonary:      Effort: Pulmonary effort is normal.      Breath sounds: Normal breath sounds.      Comments: 1 meds, 2 pleural chest tubes with scant drainage- will plan to remove today after ambulation  Chest:      Comments: Midline incision with bandage CDI   Abdominal:      General: Abdomen is flat. Bowel sounds are decreased.      Palpations: Abdomen is soft.   Genitourinary:     Comments: Diggs draining clear yellow urine - will plan to remove today    Musculoskeletal:      Right lower leg: No edema.      Left lower leg: No edema.   Skin:     Comments: Radial and Saphenous harvesting site CDI, no erythema or drainage noted    Neurological:      General: No focal deficit present.      Mental Status: He is alert and oriented to person, place, and time.         Objective:    Results for orders placed or performed during the hospital encounter of 12/07/23 (from the past 24 hour(s))   Prepare RBC: 2 Units   Result Value Ref Range    PRODUCT CODE T0243W40     Unit Number [id number]     Unit ABO A     Unit RH NEG     XM INTEP COMP     Dispense Status XM     Blood Expiration Date December 21, 2023 23:59 EST     PRODUCT BLOOD TYPE 0600     UNIT VOLUME 350     PRODUCT CODE Z1213B05     Unit Number [id number]     Unit ABO A     Unit RH NEG     XM INTEP COMP     Dispense Status XM     Blood Expiration Date December 22, 2023 23:59 EST     PRODUCT BLOOD TYPE 0600     UNIT VOLUME 350    Blood Gas Arterial Full Panel Unsolicited   Result Value Ref Range    POCT pH, Arterial 7.41 7.38 - 7.42 pH    POCT pCO2, Arterial 43 (H) 38 - 42 mm Hg    POCT pO2, Arterial 187 (H) 85 - 95 mm Hg    POCT SO2, Arterial 99 94 - 100 %    POCT Oxy Hemoglobin, Arterial 94.9 94.0 - 98.0 %    POCT Hematocrit Calculated, Arterial 43.0 41.0 - 52.0 %    POCT Sodium, Arterial 135 (L) 136 - 145 mmol/L    POCT Potassium, Arterial 4.0 3.5 - 5.3 mmol/L    POCT Chloride, Arterial 103 98 - 107 mmol/L    POCT Ionized Calcium, Arterial 1.23 1.10 - 1.33 mmol/L    POCT Glucose, Arterial 157 (H) 74 - 99 mg/dL    POCT Lactate, Arterial 1.1 0.4 - 2.0 mmol/L    POCT Base Excess, Arterial 2.2 -2.0 - 3.0 mmol/L    POCT HCO3 Calculated, Arterial 27.3 (H) 22.0 - 26.0 mmol/L    POCT Hemoglobin, Arterial 14.3 13.5 - 17.5 g/dL    POCT Anion Gap, Arterial 9 (L) 10 - 25 mmo/L    Patient Temperature     ACTIVATED CLOTTING TIME HIGH   Result Value Ref Range    POCT Activated Clotting Time High Range 117 96 - 152 sec    Blood Gas Venous Full Panel Unsolicited   Result Value Ref Range    POCT pH, Venous 7.33 7.33 - 7.43 pH    POCT pCO2, Venous 52 (H) 41 - 51 mm Hg    POCT pO2, Venous 61 (H) 35 - 45 mm Hg    POCT SO2, Venous 89 (H) 45 - 75 %    POCT Oxy Hemoglobin, Venous 85.7 (H) 45.0 - 75.0 %    POCT Hematocrit Calculated, Venous 41.0 41.0 - 52.0 %    POCT Sodium, Venous 135 (L) 136 - 145 mmol/L    POCT Potassium, Venous 4.0 3.5 - 5.3 mmol/L    POCT Chloride, Venous 103 98 - 107 mmol/L    POCT Ionized Calicum, Venous 1.22 1.10 - 1.33 mmol/L    POCT Glucose, Venous 159 (H) 74 - 99 mg/dL    POCT Lactate, Venous 1.5 0.4 - 2.0 mmol/L    POCT Base Excess, Venous 0.5 -2.0 - 3.0 mmol/L    POCT HCO3 Calculated, Venous 27.4 (H) 22.0 - 26.0 mmol/L    POCT Hemoglobin, Venous 13.8 13.5 - 17.5 g/dL    POCT Anion Gap, Venous 9.0 (L) 10.0 - 25.0 mmol/L    Patient Temperature     Blood Gas Arterial Full Panel Unsolicited   Result Value Ref Range    POCT pH, Arterial 7.30 (L) 7.38 - 7.42 pH    POCT pCO2, Arterial 54 (H) 38 - 42 mm Hg    POCT pO2, Arterial 257 (H) 85 - 95 mm Hg    POCT SO2, Arterial 100 94 - 100 %    POCT Oxy Hemoglobin, Arterial 96.4 94.0 - 98.0 %    POCT Hematocrit Calculated, Arterial 42.0 41.0 - 52.0 %    POCT Sodium, Arterial 134 (L) 136 - 145 mmol/L    POCT Potassium, Arterial 4.9 3.5 - 5.3 mmol/L    POCT Chloride, Arterial 104 98 - 107 mmol/L    POCT Ionized Calcium, Arterial 1.19 1.10 - 1.33 mmol/L    POCT Glucose, Arterial 142 (H) 74 - 99 mg/dL    POCT Lactate, Arterial 1.8 0.4 - 2.0 mmol/L    POCT Base Excess, Arterial -0.8 -2.0 - 3.0 mmol/L    POCT HCO3 Calculated, Arterial 26.6 (H) 22.0 - 26.0 mmol/L    POCT Hemoglobin, Arterial 13.9 13.5 - 17.5 g/dL    POCT Anion Gap, Arterial 8 (L) 10 - 25 mmo/L    Patient Temperature     Blood Gas Arterial Full Panel Unsolicited   Result Value Ref Range    POCT pH, Arterial 7.26 (L) 7.38 - 7.42 pH    POCT pCO2, Arterial 63 (H) 38 - 42 mm Hg    POCT pO2, Arterial 408 (H) 85 - 95 mm  Hg    POCT SO2, Arterial 99 94 - 100 %    POCT Oxy Hemoglobin, Arterial 96.1 94.0 - 98.0 %    POCT Hematocrit Calculated, Arterial 35.0 (L) 41.0 - 52.0 %    POCT Sodium, Arterial 134 (L) 136 - 145 mmol/L    POCT Potassium, Arterial 6.2 (HH) 3.5 - 5.3 mmol/L    POCT Chloride, Arterial 104 98 - 107 mmol/L    POCT Ionized Calcium, Arterial 1.16 1.10 - 1.33 mmol/L    POCT Glucose, Arterial 147 (H) 74 - 99 mg/dL    POCT Lactate, Arterial 1.9 0.4 - 2.0 mmol/L    POCT Base Excess, Arterial 0.1 -2.0 - 3.0 mmol/L    POCT HCO3 Calculated, Arterial 28.3 (H) 22.0 - 26.0 mmol/L    POCT Hemoglobin, Arterial 11.6 (L) 13.5 - 17.5 g/dL    POCT Anion Gap, Arterial 8 (L) 10 - 25 mmo/L    Patient Temperature      FiO2 80 %   Blood Gas Venous Full Panel Unsolicited   Result Value Ref Range    POCT pH, Venous 7.25 (LL) 7.33 - 7.43 pH    POCT pCO2, Venous 66 (H) 41 - 51 mm Hg    POCT pO2, Venous 67 (H) 35 - 45 mm Hg    POCT SO2, Venous 92 (H) 45 - 75 %    POCT Oxy Hemoglobin, Venous 88.9 (H) 45.0 - 75.0 %    POCT Hematocrit Calculated, Venous 35.0 (L) 41.0 - 52.0 %    POCT Sodium, Venous 134 (L) 136 - 145 mmol/L    POCT Potassium, Venous 6.0 (H) 3.5 - 5.3 mmol/L    POCT Chloride, Venous 103 98 - 107 mmol/L    POCT Ionized Calicum, Venous 1.19 1.10 - 1.33 mmol/L    POCT Glucose, Venous 149 (H) 74 - 99 mg/dL    POCT Lactate, Venous 1.9 0.4 - 2.0 mmol/L    POCT Base Excess, Venous 0.4 -2.0 - 3.0 mmol/L    POCT HCO3 Calculated, Venous 28.9 (H) 22.0 - 26.0 mmol/L    POCT Hemoglobin, Venous 11.6 (L) 13.5 - 17.5 g/dL    POCT Anion Gap, Venous 8.0 (L) 10.0 - 25.0 mmol/L    Patient Temperature      FiO2 70 %   Blood Gas Arterial Full Panel Unsolicited   Result Value Ref Range    POCT pH, Arterial 7.33 (L) 7.38 - 7.42 pH    POCT pCO2, Arterial 47 (H) 38 - 42 mm Hg    POCT pO2, Arterial 241 (H) 85 - 95 mm Hg    POCT SO2, Arterial 99 94 - 100 %    POCT Oxy Hemoglobin, Arterial 95.6 94.0 - 98.0 %    POCT Hematocrit Calculated, Arterial 35.0 (L) 41.0 -  52.0 %    POCT Sodium, Arterial 133 (L) 136 - 145 mmol/L    POCT Potassium, Arterial 5.8 (H) 3.5 - 5.3 mmol/L    POCT Chloride, Arterial 104 98 - 107 mmol/L    POCT Ionized Calcium, Arterial 1.17 1.10 - 1.33 mmol/L    POCT Glucose, Arterial 153 (H) 74 - 99 mg/dL    POCT Lactate, Arterial 1.8 0.4 - 2.0 mmol/L    POCT Base Excess, Arterial -1.4 -2.0 - 3.0 mmol/L    POCT HCO3 Calculated, Arterial 24.8 22.0 - 26.0 mmol/L    POCT Hemoglobin, Arterial 11.7 (L) 13.5 - 17.5 g/dL    POCT Anion Gap, Arterial 10 10 - 25 mmo/L    Patient Temperature      FiO2 70 %   Blood Gas Arterial Full Panel Unsolicited   Result Value Ref Range    POCT pH, Arterial 7.36 (L) 7.38 - 7.42 pH    POCT pCO2, Arterial 44 (H) 38 - 42 mm Hg    POCT pO2, Arterial 146 (H) 85 - 95 mm Hg    POCT SO2, Arterial 99 94 - 100 %    POCT Oxy Hemoglobin, Arterial 96.1 94.0 - 98.0 %    POCT Hematocrit Calculated, Arterial 37.0 (L) 41.0 - 52.0 %    POCT Sodium, Arterial 134 (L) 136 - 145 mmol/L    POCT Potassium, Arterial 4.7 3.5 - 5.3 mmol/L    POCT Chloride, Arterial 106 98 - 107 mmol/L    POCT Ionized Calcium, Arterial 1.13 1.10 - 1.33 mmol/L    POCT Glucose, Arterial 121 (H) 74 - 99 mg/dL    POCT Lactate, Arterial 2.2 (H) 0.4 - 2.0 mmol/L    POCT Base Excess, Arterial -0.8 -2.0 - 3.0 mmol/L    POCT HCO3 Calculated, Arterial 24.9 22.0 - 26.0 mmol/L    POCT Hemoglobin, Arterial 12.4 (L) 13.5 - 17.5 g/dL    POCT Anion Gap, Arterial 8 (L) 10 - 25 mmo/L    Patient Temperature      FiO2 100 %   ECG 12 Lead   Result Value Ref Range    Ventricular Rate 80 BPM    Atrial Rate 80 BPM    LA Interval 148 ms    QRS Duration 90 ms    QT Interval 404 ms    QTC Calculation(Bazett) 465 ms    P Axis 42 degrees    R Axis -1 degrees    T Axis 36 degrees    QRS Count 14 beats    Q Onset 227 ms    P Onset 153 ms    P Offset 208 ms    T Offset 429 ms    QTC Fredericia 445 ms   Blood Gas Arterial Full Panel   Result Value Ref Range    POCT pH, Arterial 7.32 (L) 7.38 - 7.42 pH    POCT  pCO2, Arterial 48 (H) 38 - 42 mm Hg    POCT pO2, Arterial 76 (L) 85 - 95 mm Hg    POCT SO2, Arterial 95 94 - 100 %    POCT Oxy Hemoglobin, Arterial 92.5 (L) 94.0 - 98.0 %    POCT Hematocrit Calculated, Arterial 41.0 41.0 - 52.0 %    POCT Sodium, Arterial 135 (L) 136 - 145 mmol/L    POCT Potassium, Arterial 4.6 3.5 - 5.3 mmol/L    POCT Chloride, Arterial 105 98 - 107 mmol/L    POCT Ionized Calcium, Arterial 1.10 1.10 - 1.33 mmol/L    POCT Glucose, Arterial 119 (H) 74 - 99 mg/dL    POCT Lactate, Arterial 2.0 0.4 - 2.0 mmol/L    POCT Base Excess, Arterial -1.8 -2.0 - 3.0 mmol/L    POCT HCO3 Calculated, Arterial 24.7 22.0 - 26.0 mmol/L    POCT Hemoglobin, Arterial 13.5 13.5 - 17.5 g/dL    POCT Anion Gap, Arterial 10 10 - 25 mmo/L    Patient Temperature      FiO2 60 %    Ventilator Mode A/C     Ventilator Rate 16 bpm    Tidal Volume 500 mL    Peep CHM2O 5.0 cm H2O    Site of Arterial Puncture Arterial Line    POCT GLUCOSE   Result Value Ref Range    POCT Glucose 122 (H) 74 - 99 mg/dL   Magnesium   Result Value Ref Range    Magnesium 2.59 (H) 1.60 - 2.40 mg/dL   Coagulation Screen   Result Value Ref Range    Protime 13.9 (H) 9.8 - 12.8 seconds    INR 1.2 (H) 0.9 - 1.1    aPTT 31 27 - 38 seconds   Fibrinogen   Result Value Ref Range    Fibrinogen 234 200 - 400 mg/dL   CBC   Result Value Ref Range    WBC 13.9 (H) 4.4 - 11.3 x10*3/uL    nRBC 0.0 0.0 - 0.0 /100 WBCs    RBC 4.22 (L) 4.50 - 5.90 x10*6/uL    Hemoglobin 12.8 (L) 13.5 - 17.5 g/dL    Hematocrit 38.4 (L) 41.0 - 52.0 %    MCV 91 80 - 100 fL    MCH 30.3 26.0 - 34.0 pg    MCHC 33.3 32.0 - 36.0 g/dL    RDW 13.8 11.5 - 14.5 %    Platelets 155 150 - 450 x10*3/uL   Renal Function Panel   Result Value Ref Range    Glucose 119 (H) 74 - 99 mg/dL    Sodium 138 136 - 145 mmol/L    Potassium 4.3 3.5 - 5.3 mmol/L    Chloride 108 (H) 98 - 107 mmol/L    Bicarbonate 26 21 - 32 mmol/L    Anion Gap 8 (L) 10 - 20 mmol/L    Urea Nitrogen 9 6 - 23 mg/dL    Creatinine 0.91 0.50 - 1.30  mg/dL    eGFR >90 >60 mL/min/1.73m*2    Calcium 8.2 (L) 8.6 - 10.3 mg/dL    Phosphorus 3.2 2.5 - 4.9 mg/dL    Albumin 3.5 3.4 - 5.0 g/dL   POCT GLUCOSE   Result Value Ref Range    POCT Glucose 111 (H) 74 - 99 mg/dL   POCT GLUCOSE   Result Value Ref Range    POCT Glucose 137 (H) 74 - 99 mg/dL   POCT GLUCOSE   Result Value Ref Range    POCT Glucose 124 (H) 74 - 99 mg/dL   BLOOD GAS ARTERIAL FULL PANEL   Result Value Ref Range    POCT pH, Arterial 7.35 (L) 7.38 - 7.42 pH    POCT pCO2, Arterial 43 (H) 38 - 42 mm Hg    POCT pO2, Arterial 62 (L) 85 - 95 mm Hg    POCT SO2, Arterial 90 (L) 94 - 100 %    POCT Oxy Hemoglobin, Arterial 88.3 (L) 94.0 - 98.0 %    POCT Hematocrit Calculated, Arterial 39.0 (L) 41.0 - 52.0 %    POCT Sodium, Arterial 135 (L) 136 - 145 mmol/L    POCT Potassium, Arterial 4.5 3.5 - 5.3 mmol/L    POCT Chloride, Arterial 104 98 - 107 mmol/L    POCT Ionized Calcium, Arterial 1.11 1.10 - 1.33 mmol/L    POCT Glucose, Arterial 132 (H) 74 - 99 mg/dL    POCT Lactate, Arterial 1.3 0.4 - 2.0 mmol/L    POCT Base Excess, Arterial -2.0 -2.0 - 3.0 mmol/L    POCT HCO3 Calculated, Arterial 23.7 22.0 - 26.0 mmol/L    POCT Hemoglobin, Arterial 13.1 (L) 13.5 - 17.5 g/dL    POCT Anion Gap, Arterial 12 10 - 25 mmo/L    Patient Temperature      FiO2 100 %   Blood Gas Arterial Full Panel   Result Value Ref Range    POCT pH, Arterial 7.36 (L) 7.38 - 7.42 pH    POCT pCO2, Arterial 42 38 - 42 mm Hg    POCT pO2, Arterial 71 (L) 85 - 95 mm Hg    POCT SO2, Arterial 92 (L) 94 - 100 %    POCT Oxy Hemoglobin, Arterial 90.6 (L) 94.0 - 98.0 %    POCT Hematocrit Calculated, Arterial 38.0 (L) 41.0 - 52.0 %    POCT Sodium, Arterial 132 (L) 136 - 145 mmol/L    POCT Potassium, Arterial 4.6 3.5 - 5.3 mmol/L    POCT Chloride, Arterial 104 98 - 107 mmol/L    POCT Ionized Calcium, Arterial 1.13 1.10 - 1.33 mmol/L    POCT Glucose, Arterial 128 (H) 74 - 99 mg/dL    POCT Lactate, Arterial 1.4 0.4 - 2.0 mmol/L    POCT Base Excess, Arterial -1.8  -2.0 - 3.0 mmol/L    POCT HCO3 Calculated, Arterial 23.7 22.0 - 26.0 mmol/L    POCT Hemoglobin, Arterial 12.6 (L) 13.5 - 17.5 g/dL    POCT Anion Gap, Arterial 9 (L) 10 - 25 mmo/L    Patient Temperature      FiO2 80 %   Magnesium   Result Value Ref Range    Magnesium 2.04 1.60 - 2.40 mg/dL   CBC   Result Value Ref Range    WBC 10.4 4.4 - 11.3 x10*3/uL    nRBC 0.0 0.0 - 0.0 /100 WBCs    RBC 4.16 (L) 4.50 - 5.90 x10*6/uL    Hemoglobin 12.6 (L) 13.5 - 17.5 g/dL    Hematocrit 38.5 (L) 41.0 - 52.0 %    MCV 93 80 - 100 fL    MCH 30.3 26.0 - 34.0 pg    MCHC 32.7 32.0 - 36.0 g/dL    RDW 13.9 11.5 - 14.5 %    Platelets 163 150 - 450 x10*3/uL   Renal Function Panel   Result Value Ref Range    Glucose 126 (H) 74 - 99 mg/dL    Sodium 134 (L) 136 - 145 mmol/L    Potassium 4.3 3.5 - 5.3 mmol/L    Chloride 105 98 - 107 mmol/L    Bicarbonate 24 21 - 32 mmol/L    Anion Gap 9 (L) 10 - 20 mmol/L    Urea Nitrogen 9 6 - 23 mg/dL    Creatinine 0.74 0.50 - 1.30 mg/dL    eGFR >90 >60 mL/min/1.73m*2    Calcium 8.2 (L) 8.6 - 10.3 mg/dL    Phosphorus 3.2 2.5 - 4.9 mg/dL    Albumin 3.9 3.4 - 5.0 g/dL   Coagulation Screen   Result Value Ref Range    Protime 13.5 (H) 9.8 - 12.8 seconds    INR 1.2 (H) 0.9 - 1.1    aPTT 31 27 - 38 seconds   POCT GLUCOSE   Result Value Ref Range    POCT Glucose 130 (H) 74 - 99 mg/dL   ECG 12 Lead   Result Value Ref Range    Ventricular Rate 76 BPM    Atrial Rate 76 BPM    NJ Interval 134 ms    QRS Duration 86 ms    QT Interval 374 ms    QTC Calculation(Bazett) 420 ms    P Axis 42 degrees    R Axis -5 degrees    T Axis 11 degrees    QRS Count 13 beats    Q Onset 224 ms    P Onset 157 ms    P Offset 207 ms    T Offset 411 ms    QTC Fredericia 404 ms      ECG 12 Lead    Result Date: 12/8/2023  Normal sinus rhythm Inferior infarct , age undetermined Possible Anterior infarct , age undetermined Abnormal ECG When compared with ECG of 07-DEC-2023 13:42, Borderline criteria for Anterior infarct are now Present Inferior  infarct is now Present    XR chest 1 view    Result Date: 12/8/2023  Interpreted By:  Dacia Wolf, STUDY: XR CHEST 1 VIEW;  12/8/2023 12:48 am   INDICATION: Signs/Symptoms:Hypoxia.   COMPARISON: Chest x-ray 12/07/2023   ACCESSION NUMBER(S): [id number]   ORDERING CLINICIAN: DIETER GOMEZ   FINDINGS: Interval removal of endotracheal and enteric tubes. Right IJ Holstein-H[place] catheter terminates in the right pulmonary artery. Mediastinal drain and bilateral chest tubes are stable in position. Midline sternotomy wires and epicardial leads noted. Overlying leads are also present.   CARDIOMEDIASTINAL SILHOUETTE: Cardiac silhouette is enlarged but stable.   LUNGS: Bandlike opacities in the right lung may relate to atelectasis. There is retrocardiac airspace opacity with blunting of the costophrenic angle which may relate to small effusion and atelectasis. No significant interval change. No definite pneumothorax.   ABDOMEN: No remarkable upper abdominal findings.   BONES: No acute osseous abnormality.       Bibasilar opacities may relate to small effusion and atelectasis. Superimposed infection not excluded. Attention on continued follow-up is advised.   Support hardware as described above.   MACRO: None   Signed by: Dacia Wolf 12/8/2023 1:22 AM Dictation workstation:   [id number]    ECG 12 Lead    Result Date: 12/7/2023  Normal sinus rhythm Normal ECG When compared with ECG of 06-DEC-2023 12:56, (unconfirmed) No significant change was found Confirmed by Mario Lennon ([id number]) on 12/7/2023 4:13:49 PM    ECG 12 lead (Clinic Performed)    Result Date: 12/7/2023  Normal sinus rhythm Inferior infarct (Cannot exclude) Abnormal ECG No previous ECGs available Confirmed by Mario Lennon ([id number]) on 12/7/2023 4:04:48 PM    XR chest 1 view    Result Date: 12/7/2023  Interpreted By:  Waldo Carmichael, STUDY: XR CHEST 1 VIEW;  12/7/2023 2:23 pm   INDICATION: Signs/Symptoms:Post op cardiac surgery.   COMPARISON: 12/07/2023  at 8:55 a.m..   ACCESSION NUMBER(S): [id number]   ORDERING CLINICIAN: BREANNE YAÑEZ   FINDINGS: CARDIOMEDIASTINAL SILHOUETTE: Endotracheal tube is now seen with tip projecting approximately 4.5 cm above the inna level. NG tube is now seen extending to the stomach level with tip not visualized. Arco-Herminia catheter from right jugular approach remains in place with tubing tip again projecting at the right main pulmonary artery level. There have been interval postoperative changes of the mediastinum with new median sternotomy wires and scattered surgical clips. Borderline size of the cardiac silhouette and mild aortic prominence is again seen, partially exaggerated by lower inspiratory volume. There is also a new cephalad directed midline probable mediastinal drain.   LUNGS: Bilateral cephalad directed chest tubes are now seen. Inspiratory volume is low. Elevation of the right hemidiaphragm again seen. New irregular regions of atelectasis and/or small infiltrates are seen in the perihilar regions bilaterally and left base. Small effusions not excluded. No appreciable pneumothorax.   ABDOMEN: No remarkable upper abdominal findings.   BONES: Bones are stable.       1.  Interval postoperative changes of the mediastinum with new life-support lines and tubes as detailed. 2. Low inspiratory volume with new irregular regions of atelectasis and or infiltrates in the perihilar regions bilaterally and left base.       MACRO: None.   Signed by: Waldo Carmichael 12/7/2023 3:09 PM Dictation workstation:   [id number]    XR chest 1 view    Result Date: 12/7/2023  Interpreted By:  Waldo Carmichael, STUDY: XR CHEST 1 VIEW;  12/7/2023 8:55 am   INDICATION: Signs/Symptoms:line placement.   COMPARISON: None.   ACCESSION NUMBER(S): [id number]   ORDERING CLINICIAN: NATHALIA TOLLIVER   FINDINGS: CARDIOMEDIASTINAL SILHOUETTE: There is a Arco-Herminia catheter from right jugular approach with tubing tip projecting near the right hilum at the  right main pulmonary artery level. Cardiac silhouette is not significantly enlarged.   LUNGS: There is elevation of the right hemidiaphragm. Mild left basilar atelectasis is seen. No definite pleural effusion. No pneumothorax is seen.   ABDOMEN: No remarkable upper abdominal findings.   BONES: Multilevel endplate spurring present in the spine. Degenerative changes of the shoulders are partially visualized.       1.  [place]-H[place]inia catheter from right jugular approach has tubing tip projecting at the right main pulmonary artery level. 2. Mild left basilar atelectasis.       MACRO: None.   Signed by: Waldo Carmichael 12/7/2023 3:07 PM Dictation workstation:   [id number]    CT chest wo IV contrast    Result Date: 12/6/2023  Interpreted By:  Schoenberger, Joseph, STUDY: CT CHEST WO IV CONTRAST;  12/6/2023 11:35 am   INDICATION: Signs/Symptoms:pre-operative cabg.   COMPARISON: None.   ACCESSION NUMBER(S): [id number]   ORDERING CLINICIAN: DENICE CARTY   TECHNIQUE: Helical data acquisition of the chest was obtained  without IV contrast material.  Images were reformatted in axial, coronal, and sagittal planes.   FINDINGS: LUNGS AND AIRWAYS: The trachea and central airways are patent. No endobronchial lesion.   There is a subpleural calcified nodule in the left lower lobe consistent with prior granulomatous disease. Measures less than 5 mm. There is no pleural effusion, pneumothorax, or airspace opacity. No other significant focal lung opacities are noted.   MEDIASTINUM AND NASRIN, LOWER NECK AND AXILLA: The visualized thyroid gland is within normal limits.   No evidence of thoracic lymphadenopathy by CT criteria.   Esophagus appears normal   HEART AND VESSELS: The the thoracic aorta is normal in course and caliber. The caliber of the ascending aorta approximates 3.5 cm. There are calcifications on the aortic valve. No calcifications of the ascending aorta. Mild calcifications in the arch and descending aorta.   Main  pulmonary artery and its branches are normal in caliber.   There are severe coronary atherosclerotic calcification the study is not optimized for evaluation of coronary arteries.   The cardiac chambers are not enlarged.   No evidence of pericardial effusion.   UPPER ABDOMEN: The visualized subdiaphragmatic structures demonstrate no remarkable findings.   CHEST WALL AND OSSEOUS STRUCTURES: There are no suspicious osseous lesions. Multilevel degenerative changes are present       1.  No significant ascending aortic calcifications. See discussion above   MACRO: None   Signed by: Joseph Schoenberger 12/6/2023 12:37 PM Dictation workstation:   [id number]      Vent Mode: CPAP  FiO2 (%):  [80 %-100 %] 80 %  S RR:  [16] 16  S VT:  [500 mL] 500 mL  PEEP/CPAP (cm H2O):  [5 cm H20] 5 cm H20  [place] SUP:  [10 cm H20] 10 cm H20  MAP (cm H2O):  [8.1-9.5] 8.1      Intake/Output Summary (Last 24 hours) at 12/8/2023 0830  Last data filed at 12/8/2023 0700  Gross per 24 hour   Intake 5233.41 ml   Output 2855 ml   Net 2378.41 ml         Assessment/Plan:    I am currently managing this critically ill patient for the following problems:    Post Op Pain  Hx of Nicotine abuse  Hx of former IV drug use  Hx of HTN  Triple Vessel CAD s/p CABG x 3  Hyperglycemia   Post Op Respiratory Insufficiency     Neuro/Psych/Pain Ctrl/Sedation:  Post-Op Pain  Former IV drug use  Hx of Nicotine abuse   -Incisional pain  -PRN Dilaudid,oxycodone, IV tylenol and norflex for pain management    Respiratory/ENT:  Post-Op Respiratory Insufficiency  -Hx of PPD smoking, no documented pulmonary insufficiency   -Patient was extubated on 12/7 to NC, pt did desat to the 80s was put on CPAP overnight and given 20 mg IV Lasixs for diuresis now on 8 L NC   -Wean supplemental oxygen for spO2 goal > 92%  -ABG as needed  -TID duonebs  -Daily CXR    Cardiovascular:  Triple Vessel CAD s/p CABG x 3  Patient underwent on pump CABG x3 LIMA-LAD, RA-OM, SVG-PDA.   -V wires in place,  "paced   -2 pleural, 1 mediastinal chest tube, maintain to wall suction, monitor output, notify if > 100cc/hr- plan plan to remove today after ambulation  -Volume resuscitation postoperatively with 250cc boluses LR as needed and albumin 5% PRN  -PA in place, monitor hemodynamics- will plan to remove today   -Wean vasoactive medications to MAP 70-90, CI > 2.2  -Daily BMP, keep K >4, Mg > 2  -Start aspirin and statin POD #1  -Vancomycin, documented allergies to penicillin   -PT/OT consult and OOB POD #1  -[facility] following and appreciate further management       GI:  No active issues   PPI for GI prophylaxis    Renal/Volume Status (Intra & Extravascular):  Daily CMP  Keep MG >2 and K >4  Replace PRN per protocol   Hourly Is and Os  Maintain Diggs Cathete    Endocrine  Hyperglycemia  -No history of DM, likely stress induced  -Strict blood glucose control post-operatively  -Glucose goal   -Insulin gtt vs SSI per ICU protoco    Infectious Disease:  Leukocytosis 13.9, likely reactive  Trend CBC  Postoperative Vancomycin   Lactate 2.0     Heme/Onc:  Acute Blood Loss Anemia Post-Op  -Baseline Hgb 14.0  -Daily CBC  -Transfuse for Hgb goal > 7 or massive blood loss with hemodynamic instability     OBGYN/MSK:  PT and OT POD # 1  OOB as tolerated       Ethics/Code Status:  Full Code     [facility] Cardiothoracic Surgery/ICU Quality Check      New Onset Organ Failure (ATA, Shock, ALI etc): No  >2 Vasopressors/Inotropes (Levophed > 0.1mcg/kg/min + Vasopressin) for more than 8 hours: No  Ongoing Blood Product Transfusion: No  Sepsis/New Infection: No  Delirium: No  Readmission to ICU: No  Family/Patient Concerns: NONE    If any \"yes\" to the above, action plan as noted below: NONE      :  DVT Prophylaxis: will plan to start Lovenox this afternoon  GI Prophylaxis: PPI  Bowel Regimen: miralax, ducalox, suppository PRN  Diet: Will advance as tolerated   CVC: yes- will plan to remove  [place]: yes  Diggs: yes- will plan to " remove  Restraints: none  Dispo: ICU    Critical Care Time:  45    Plan Discussed with Dr. Claribel BENAVIDES, CNP  Critical Care Medicine   [facility]

## 2023-12-08 NOTE — PROGRESS NOTES
Nutrition Progress Note  Consult ordered by this RD for education on Kirby following surgery. Pt sleeping at first two attempted visits and unavailable at third attempt. Will order Kirby BID to promote post-op healing and re-attempt to educate pt on post-op nutrition as able.

## 2023-12-08 NOTE — PROGRESS NOTES
Occupational Therapy    Evaluation    Patient Name: Liban Jaramillo  MRN: [id number]  Today's Date: 12/8/2023  Time Calculation  Start Time: 1448  Stop Time: 1505  Time Calculation (min): 17 min    Assessment  IP OT Assessment  Prognosis: Good  Medical Staff Made Aware: Yes  End of Session Communication: Bedside nurse  End of Session Patient Position: Up in chair, Alarm on (All needs in reach, set up with lunch tray)  Plan:  Treatment Interventions: ADL retraining, Functional transfer training, Endurance training, Patient/family training, Equipment evaluation/education, Compensatory technique education  OT Frequency: 3 times per week  OT Discharge Recommendations: Low intensity level of continued care (assist from family as needed)  OT - OK to Discharge: Yes (when deemed medically appropriate for d/c.)    Subjective   Current Problem:  1. Coronary artery disease involving native coronary artery of native heart with unstable angina pectoris (CMS/HCC)  Anesthesia Intraoperative Transesophageal Echocardiogram    Anesthesia Intraoperative Transesophageal Echocardiogram    CANCELED: Transesophageal Echo (BECCA)    CANCELED: Transesophageal Echo (BECCA)      2. Atherosclerotic heart disease of native coronary artery without angina pectoris  Anesthesia Intraoperative Transesophageal Echocardiogram        General:  General  Reason for Referral: Recent cardiothoracic surgery  Referred By: Cesia  Past Medical History Relevant to Rehab: COPD, dyslipidemia, OA, HTN, MI, anxiety, PTSD, hypothyroidism, hernia repair, CAD, DM, smoker, cardiac cath, alcohol use, cervical disc disease, former IV drug use.  Family/Caregiver Present: No  Co-Treatment: PT  Prior to Session Communication: Bedside nurse  Patient Position Received: Bed, 3 rail up  General Comment: CABG x 3, L radial and R leg vein harvest, median sternotomy on 12/7/23 , worsening hypoxia post-op.  Precautions:  Medical Precautions: Fall precautions, Oxygen therapy device and L/min  (MITT)  Precautions Comment: Pt. c/o double and triple vision during session.  Therapist notified RN.  Vital Signs:  Heart Rate:  (83 at start of session in bed, 81 at end of session in chair.)  SpO2:  (96% at start of session in bed, 93% at end of session in chair.  8L via oxymizer.)  BP:  (141/62 at start of session in supine.  102/59 at end of session in chair.  Readings via a-line.  Unsure if final reading is accurate 2nd to change of position in relation to transducer.)  Pain:  Pain Assessment  Pain Assessment: 0-10  Pain Score:  (No pain at rest.  C/o of pain in upper abdomen and in back where he reports compressed discs, with mobility, but pt. did not rate pain during mobility.  Pain improved once seated in chair.)  Response to Interventions:  (pain relieved with repositioning to sitting in chair.)    Objective   Cognition:  Overall Cognitive Status:  (easily agitated, frustrated, drowsy)  Orientation Level: Oriented X4           Home Living:  Home Living Comments: Lives with wife who works and daughter who is in school.  No steps to enter.  Pt. uses a basement bedroom and bathroom.  No handrail to the basement.  Tub/shower, no seat or safety bar.  Independent with ADL.  Drives.  Wife manages all IADL.  Pt. does not work.   Prior Function:     IADL History:     ADL:  Eating Assistance: Independent  Grooming Deficit: Setup  Bathing Assistance: Moderate  UE Dressing Assistance: Moderate  LE Dressing Assistance: Maximal  Toileting Assistance with Device: Moderate  Activity Tolerance:     Bed Mobility/Transfers: Bed Mobility  Bed Mobility: Yes  Bed Mobility 1  Bed Mobility 1: Supine to sitting  Level of Assistance 1: Moderate assistance, Moderate verbal cues    Transfers  Transfer: Yes  Transfer 1  Transfer From 1: Bed to  Transfer to 1: Stand  Technique 1: Sit to stand  Transfer Device 1:  (None)  Transfer Level of Assistance 1: Moderate assistance, Moderate verbal cues  Transfers 2  Transfer From 2: Stand  to  Transfer to 2: Chair with arms  Technique 2: Stand to sit  Transfer Device 2:  (none)  Transfer Level of Assistance 2: Moderate assistance, Moderate verbal cues      Ambulation/Gait Training:  Ambulation/Gait Training  Ambulation/Gait Training Performed: Yes (~ 4' bed to chair without AD, mod assist.)  Sitting Balance:  Static Sitting Balance  Static Sitting-Comment/Number of Minutes: Good  Dynamic Sitting Balance  Dynamic Sitting-Comments: Good  Standing Balance:  Static Standing Balance  Static Standing-Comment/Number of Minutes: Fair (-)  Dynamic Standing Balance  Dynamic Standing-Comments: Fair (-)/poor (+)       Outcome Measures: [facility] Daily Activity  Putting on and taking off regular lower body clothing: A lot  Bathing (including washing, rinsing, drying): A lot  Putting on and taking off regular upper body clothing: A lot  Toileting, which includes using toilet, bedpan or urinal: A lot  Taking care of personal grooming such as brushing teeth: A little  Eating Meals: None  Daily Activity - Total Score: 15      Education Documentation  ADL Training, taught by Juli Hatch OT at 12/8/2023  3:33 PM.  Learner: Patient  Readiness: Acceptance  Method: Explanation  Response: Verbalizes Understanding    Education Comments  No comments found.      Goals:   Encounter Problems       Encounter Problems (Active)       OT Goals       Mod I ADL functional mobility with LRAD as needed vs. no device.       Start:  12/08/23    Expected End:  12/22/23            Good dynamic standing for ADL.        Start:  12/08/23    Expected End:  12/22/23            Mod I sit/stand, bed/chair/commode.        Start:  12/08/23    Expected End:  12/22/23            Tolerate 10 mins light functional activity with stable vitals.        Start:  12/08/23    Expected End:  12/22/23            Mod I LB dressing.        Start:  12/08/23    Expected End:  12/22/23

## 2023-12-08 NOTE — SIGNIFICANT EVENT
[facility] SIGNIFICANT EVENT NOTE:    Date:  12/8/2023  Patient:  Liban Jaramillo  YOB: 1965  MRN:  [id number]   Admit Date:  12/7/2023  =============================================================================================    Notified by nursing the patient is complaining of double vision.  Responded to bedside with nurse practitioners.  Patient reports that he has been difficulty with his vision for over 24 hours.  He did not complain of that this morning when we rounded on the patient and per the nurse he has not been complaining about throughout the day.    On exam the patient has no facial droop noted.  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  Double vision resolves when left eye covered.  He has 5 out of 5 motor strength in all extremities.  Sensory is intact on his face and extremities.    Discussed the case with cardiothoracic surgery.  Decision made to cut pacing wires and obtained a stat MRI as the patient high risk for stroke.  Personally cut the wire at bedside without complication.   He is not a candidate for TNK given his recent open heart surgery.  Not a candidate for mechanical thrombectomy as he does not have evidence of a large vessel occlusion.      Further plan pending imaging results.  If MRI negative may need transfer to [facility] [place]o evaluation.

## 2023-12-08 NOTE — SIGNIFICANT EVENT
[facility] SIGNIFICANT EVENT NOTE:    Date:  12/8/2023  Patient:  Liban Jaramillo  YOB: 1965  MRN:  [id number]   Admit Date:  12/7/2023  =============================================================================================    Upon evening rounds I assessed patient and noted him to be on 15 L of 100% FiO2 nonrebreather mask with oxygen saturation 92%.  I woke patient up and he was complaining of significant pain in his back, he became very anxious with desaturations to 82%.  He was given 0.5 mg hydromorphone with mild improvement in his pain but remained intermittently agitated and hypoxic on non-rebreather with spo2 88-90%.  ABG was drawn which showed pH 7.35, pCO2 43, pO2 62, SaO2 90 on 100% FiO2.  Stat x-ray was performed which looks similar in appearance to the prior x-ray, chest tubes in appropriate position on my read, patient with multiple areas of atelectasis and low inspiratory volume.    Exam:  Alert, oriented x 3.  Slightly agitated, complaining of pain to upper back.  Complaining of cough with productive sputum and pain with cough.  Rhonchi in upper airways along with mild expiratory wheeze.  Abdomen is soft, nontender, nondistended.  Normal sinus rhythm with heart rate in the 90s.  Patient hypertensive and was placed on nicardipine infusion with good BP control.  Oxygen saturation 88% on nonrebreather.    Concerns:   Worsening hypoxia 2/2 Post-Op Respiratory Insufficiency likely related to atelectasis with poor inspiratory effort and poor pulmonary hygiene due to pain.       Action Plan:  -Xray with multiple areas of atelectasis and low lung volumes  -Treat patient pain with PRN Dilaudid and 1x dose Ketorolac  -Will place on CPAP to help with lung recruitment, leave on overnight and wean off as tolerated in morning  -May benefit from gentle diuresis in morning, will monitor overnight and discuss with [facility]    Will plan to call Dr. Cheryl Murphy and Dr. Price if patient should have  decompensation despite the above interventions.     Paras Lewis, APRN-CNP

## 2023-12-08 NOTE — CONSULTS
Vancomycin Dosing by Pharmacy- Cessation of Therapy    Consult to pharmacy for vancomycin dosing has been discontinued by the prescriber, pharmacy will sign off at this time.    Post op doses x2     Please call pharmacy if there are further questions or re-enter a consult if vancomycin is resumed.     Stephanie Lopez, [facility]

## 2023-12-08 NOTE — PROGRESS NOTES
Liban Jaramillo is a 58 y.o. male on day 1 of admission presenting with Coronary artery disease involving native coronary artery of native heart with unstable angina pectoris (CMS/HCC).    Subjective   Liban Celeste is a 58 y.o. male with past medical history of hypertension, hyperlipidemia, hypothyroidism, substance abuse, smoker, cervical stenosis and radiculopathy and left rotator cuff injury. Over the last several months he has noted increasing symptoms of chest pain and dyspnea on exertion. He was evaluated at [facility] on 10/23/2023 for symptoms of recurrent chest pain.  At that time laboratory studies were unremarkable except for his elevated TSH. He was then seen by Dr. Rodriguez and was scheduled for cardiac catheterization. In the interim he did have an episode of recurrent angina and required evaluation at [facility] emergency room where he was seen by Dr. Casillas who performed cardiac catheterization there. Cardiac cath showed 90% LAD stenosis and 95% left circumflex stenosis as well as 50% left main, right coronary was without significant disease and left ventricular ejection fraction at that time was normal at 60%. Dr. Casillas felt that his findings warranted bypass surgery and he was referred to cardiovascular surgery at [facility].     On 12/7, patient underwent elective CABG x 3; LIMA-LAD, RA-OM, SVG-PDA. Cross clamp time was 57 min, on pump for 69 min. He returned to ICU in stable postoperative condition with Ventricular wires, 1 mediastinal chest tube, 1 R pleural chest tube and 1 left pleural chest tube, intubated and sedated on propofol.     POD#1: Pt was extubated yesterday evening without complication. He did have episode of concern for worsening hypoxia, agitation and severe pain in upper back late last night. At that time he was given 0.5 mg hydromorphone with mild improvement in his pain but remained intermittently agitated and hypoxic on non-rebreather with spo2 88-90%. ABG was  drawn which showed pH 7.35, pCO2 43, pO2 62, SaO2 90 on 100% FiO2. Stat x-ray was performed which looks similar in appearance to the prior x-ray, chest tubes in appropriate position, patient with multiple areas of atelectasis and low inspiratory volume. He was placed on CPAP to help with lung recruitment overnight and was able to be weaned off to 8L this AM. On exam this morning he remains afebrile and hemodynamically stable. Cardene infusion was weaned off and Metoprolol tartrate 50mg BID was resumed. Imdur 30mg daily also resumed. He is maintaining adequate oxygen saturation on 8L NC. CXR this morning showing bandlike opacities in the right lung may relate to atelectasis. There is retrocardiac airspace opacity with blunting of the costophrenic angle which may relate to small effusion and atelectasis. No significant interval change. No definite pneumothorax. He did receive Lasix 20mg IV x 1 dose this morning will good response. Will encourage IS and deep breathing frequently. Pain regimen adjusted to patent level of comfort. On exam he was very anxious and complaining of moderate back pain. Will continue Ofirmev IV q 6hrs scheduled x 4 doses, dilaudid 0.4mg q2rs prn, for breakthrough pain, Roxicodone 5-10mg q4hrs PRN will add gabapentin 300mg q8hrs scheduled and lidocaine patch q12hrs. Will continue bowel regimen as ordered, he states he has not passed any flatus. Can increase diet as tolerated once pt starts to pass gas. Denies n/v. Chest tubes with trivial serosanguinous drainage. Can remove chest tubes after patient ambulates and there is no evidence of dumping, Okay to start heparin subcutaneous once CT are removed. Increase activity as tolerated; PT/OT following.   Objective     Physical Exam  Vitals and nursing note reviewed.   Constitutional:       General: He is not in acute distress.     Appearance: Normal appearance. He is well-developed. He is not ill-appearing.   HENT:      Head: Normocephalic and  "atraumatic.   Eyes:      Pupils: Pupils are equal, round, and reactive to light. Pupils are equal.   Neck:      Thyroid: No thyroid mass.      Vascular: No JVD.      Comments: RIG swan to be removed 12/8 keep RIJ introducer   Cardiovascular:      Rate and Rhythm: Normal rate and regular rhythm.      Pulses:           Posterior tibial pulses are 2+ on the right side and 2+ on the left side.      Heart sounds: Normal heart sounds.      Comments: V wires   Pulmonary:      Effort: Tachypnea present.      Breath sounds: Decreased air movement present.   Chest:      Comments: Mid sternal incision DSD intact, no evidence of bleeding or erythema  1 med and 2 PL chest tubes with trivial serosanguinous drainage - can be removed 12/8  Abdominal:      General: Abdomen is flat. Bowel sounds are decreased.      Palpations: Abdomen is soft.   Genitourinary:     Comments: Diggs draining clear yellow- to be removed 12/8  Musculoskeletal:      Right lower leg: No edema.      Left lower leg: No edema.   Skin:     General: Skin is warm.   Neurological:      General: No focal deficit present.      Mental Status: He is alert and oriented to person, place, and time.   Psychiatric:         Attention and Perception: Attention normal.         Mood and Affect: Mood is anxious. Affect is angry.         Speech: Speech normal.         Behavior: Behavior is agitated. Behavior is cooperative.         Last Recorded Vitals  Blood pressure 154/89, pulse 84, temperature 37.3 °C (99.1 °F), temperature source Core, resp. rate 22, height 1.854 m (6' 1\"), weight 92 kg (202 lb 13.2 oz), SpO2 94 %.  Intake/Output last 3 Shifts:  I/O last 3 completed shifts:  In: 5233.4 (56.9 mL/kg) [P.O.:425; I.V.:2453.1 (26.7 mL/kg); Blood:500; IV Piggyback:1855.3]  Out: 2790 (30.3 mL/kg) [Urine:2070 (0.6 mL/kg/hr); Other:250; Chest Tube:470]  Weight: 92 kg     Relevant Results              Current Facility-Administered Medications:     acetaminophen (Ofirmev) injection " 1,000 mg, 1,000 mg, intravenous, q6h BRANDON, Waldo Price DO, 1,000 mg at 12/08/23 0932    acetylcysteine (Mucomyst) 200 mg/mL (20 %) nebulizer solution 600 mg, 3 mL, nebulization, TID, Waldo Price DO    aspirin chewable tablet 81 mg, 81 mg, oral, Daily, Wlado Price DO, 81 mg at 12/08/23 0921    atorvastatin (Lipitor) tablet 80 mg, 80 mg, oral, Nightly, TAMEKA Abreu    bisacodyl (Dulcolax) EC tablet 10 mg, 10 mg, oral, Daily PRN, TAMEKA Abreu    dextrose 50 % injection 25 g, 25 g, intravenous, q15 min PRN **OR** glucagon (Glucagen) injection 1 mg, 1 mg, intramuscular, q15 min PRN, TAMEKA Zelaya    gabapentin (Neurontin) capsule 300 mg, 300 mg, oral, q8h BRANDON, TAMEKA Abreu    heparin (porcine) injection 5,000 Units, 5,000 Units, subcutaneous, q8h, TAMEKA Abreu    HYDROmorphone (Dilaudid) injection 0.4 mg, 0.4 mg, intravenous, q2h PRN, Waldo Price DO    insulin lispro (HumaLOG) injection 0-15 Units, 0-15 Units, subcutaneous, q4h, TAMEKA Zelaya    ipratropium-albuteroL (Duo-Neb) 0.5-2.5 mg/3 mL nebulizer solution 3 mL, 3 mL, nebulization, TID, TAMEKA Abreu, 3 mL at 12/08/23 0704    isosorbide mononitrate ER (Imdur) 24 hr tablet 30 mg, 30 mg, oral, Daily, Waldo Price DO, 30 mg at 12/08/23 0921    levothyroxine (Synthroid, Levoxyl) tablet 88 mcg, 88 mcg, oral, Daily, TAMEKA Abreu, 88 mcg at 12/08/23 0546    lidocaine 4 % patch 1 patch, 1 patch, transdermal, Daily, Waldo Price DO    magnesium sulfate IV 2 g, 2 g, intravenous, q6h PRN, TAMEKA Abreu    magnesium sulfate IV 4 g, 4 g, intravenous, q6h PRN, TAMEKA Abreu    metoprolol tartrate (Lopressor) tablet 50 mg, 50 mg, oral, BID, Waldo Price DO, 50 mg at 12/08/23 0922    naloxone (Narcan) injection 0.2 mg, 0.2 mg, intravenous, q5 min PRN, TAMEKA Abreu    ondansetron (Zofran)  tablet 4 mg, 4 mg, oral, q8h PRN **OR** ondansetron (Zofran) injection 4 mg, 4 mg, intravenous, q8h PRN, KENNEDY Abreu-CNP    orphenadrine (Norflex) injection 60 mg, 60 mg, intravenous, q12h BRANDON, Waldo Price,     oxyCODONE (Roxicodone) immediate release tablet 10 mg, 10 mg, oral, q4h PRN, Waldo Price DO    oxyCODONE (Roxicodone) immediate release tablet 5 mg, 5 mg, oral, q4h PRN, KENNEDY Abreu-CNP, 5 mg at 12/08/23 1019    oxygen (O2) therapy, , inhalation, Continuous PRN - O2/gases, KENNEDY Zelaya-CNP    oxygen (O2) therapy, , inhalation, Continuous PRN - O2/gases, KENNEDY Zelaya-CNP, 8 L/min at 12/08/23 0704    pantoprazole (ProtoNix) EC tablet 40 mg, 40 mg, oral, Daily before breakfast, 40 mg at 12/08/23 0546 **OR** pantoprazole (ProtoNix) injection 40 mg, 40 mg, intravenous, Daily before breakfast, KENNEDY Abreu-CNP, 40 mg at 12/07/23 1522    polyethylene glycol (Glycolax, Miralax) packet 17 g, 17 g, oral, Daily, KENNEDY Abreu-CNP, 17 g at 12/08/23 0921    potassium chloride CR (Klor-Con M20) ER tablet 20 mEq, 20 mEq, oral, q6h PRN **OR** potassium chloride (Klor-Con) packet 20 mEq, 20 mEq, oral, q6h PRN, KENNEDY Abreu-CNP    potassium chloride CR (Klor-Con M20) ER tablet 40 mEq, 40 mEq, oral, q6h PRN **OR** potassium chloride (Klor-Con) packet 40 mEq, 40 mEq, oral, q6h PRN, KENNEDY Abreu-CNP    potassium chloride 20 mEq in 100 mL IV premix, 20 mEq, intravenous, q6h PRN, KENNEDY Abreu-CNP    potassium chloride 40 mEq in 100 mL IV premix, 40 mEq, intravenous, q6h PRN, TAMEKA Abreu   Results for orders placed or performed during the hospital encounter of 12/07/23 (from the past 24 hour(s))   Blood Gas Arterial Full Panel Unsolicited   Result Value Ref Range    POCT pH, Arterial 7.36 (L) 7.38 - 7.42 pH    POCT pCO2, Arterial 44 (H) 38 - 42 mm Hg    POCT pO2, Arterial 146 (H) 85 - 95 mm Hg    POCT SO2,  Arterial 99 94 - 100 %    POCT Oxy Hemoglobin, Arterial 96.1 94.0 - 98.0 %    POCT Hematocrit Calculated, Arterial 37.0 (L) 41.0 - 52.0 %    POCT Sodium, Arterial 134 (L) 136 - 145 mmol/L    POCT Potassium, Arterial 4.7 3.5 - 5.3 mmol/L    POCT Chloride, Arterial 106 98 - 107 mmol/L    POCT Ionized Calcium, Arterial 1.13 1.10 - 1.33 mmol/L    POCT Glucose, Arterial 121 (H) 74 - 99 mg/dL    POCT Lactate, Arterial 2.2 (H) 0.4 - 2.0 mmol/L    POCT Base Excess, Arterial -0.8 -2.0 - 3.0 mmol/L    POCT HCO3 Calculated, Arterial 24.9 22.0 - 26.0 mmol/L    POCT Hemoglobin, Arterial 12.4 (L) 13.5 - 17.5 g/dL    POCT Anion Gap, Arterial 8 (L) 10 - 25 mmo/L    Patient Temperature      FiO2 100 %   ECG 12 Lead   Result Value Ref Range    Ventricular Rate 80 BPM    Atrial Rate 80 BPM    TN Interval 148 ms    QRS Duration 90 ms    QT Interval 404 ms    QTC Calculation(Bazett) 465 ms    P Axis 42 degrees    R Axis -1 degrees    T Axis 36 degrees    QRS Count 14 beats    Q Onset 227 ms    P Onset 153 ms    P Offset 208 ms    T Offset 429 ms    QTC Fredericia 445 ms   Blood Gas Arterial Full Panel   Result Value Ref Range    POCT pH, Arterial 7.32 (L) 7.38 - 7.42 pH    POCT pCO2, Arterial 48 (H) 38 - 42 mm Hg    POCT pO2, Arterial 76 (L) 85 - 95 mm Hg    POCT SO2, Arterial 95 94 - 100 %    POCT Oxy Hemoglobin, Arterial 92.5 (L) 94.0 - 98.0 %    POCT Hematocrit Calculated, Arterial 41.0 41.0 - 52.0 %    POCT Sodium, Arterial 135 (L) 136 - 145 mmol/L    POCT Potassium, Arterial 4.6 3.5 - 5.3 mmol/L    POCT Chloride, Arterial 105 98 - 107 mmol/L    POCT Ionized Calcium, Arterial 1.10 1.10 - 1.33 mmol/L    POCT Glucose, Arterial 119 (H) 74 - 99 mg/dL    POCT Lactate, Arterial 2.0 0.4 - 2.0 mmol/L    POCT Base Excess, Arterial -1.8 -2.0 - 3.0 mmol/L    POCT HCO3 Calculated, Arterial 24.7 22.0 - 26.0 mmol/L    POCT Hemoglobin, Arterial 13.5 13.5 - 17.5 g/dL    POCT Anion Gap, Arterial 10 10 - 25 mmo/L    Patient Temperature      FiO2 60 %     Ventilator Mode A/C     Ventilator Rate 16 bpm    Tidal Volume 500 mL    Peep CHM2O 5.0 cm H2O    Site of Arterial Puncture Arterial Line    POCT GLUCOSE   Result Value Ref Range    POCT Glucose 122 (H) 74 - 99 mg/dL   Magnesium   Result Value Ref Range    Magnesium 2.59 (H) 1.60 - 2.40 mg/dL   Coagulation Screen   Result Value Ref Range    Protime 13.9 (H) 9.8 - 12.8 seconds    INR 1.2 (H) 0.9 - 1.1    aPTT 31 27 - 38 seconds   Fibrinogen   Result Value Ref Range    Fibrinogen 234 200 - 400 mg/dL   CBC   Result Value Ref Range    WBC 13.9 (H) 4.4 - 11.3 x10*3/uL    nRBC 0.0 0.0 - 0.0 /100 WBCs    RBC 4.22 (L) 4.50 - 5.90 x10*6/uL    Hemoglobin 12.8 (L) 13.5 - 17.5 g/dL    Hematocrit 38.4 (L) 41.0 - 52.0 %    MCV 91 80 - 100 fL    MCH 30.3 26.0 - 34.0 pg    MCHC 33.3 32.0 - 36.0 g/dL    RDW 13.8 11.5 - 14.5 %    Platelets 155 150 - 450 x10*3/uL   Renal Function Panel   Result Value Ref Range    Glucose 119 (H) 74 - 99 mg/dL    Sodium 138 136 - 145 mmol/L    Potassium 4.3 3.5 - 5.3 mmol/L    Chloride 108 (H) 98 - 107 mmol/L    Bicarbonate 26 21 - 32 mmol/L    Anion Gap 8 (L) 10 - 20 mmol/L    Urea Nitrogen 9 6 - 23 mg/dL    Creatinine 0.91 0.50 - 1.30 mg/dL    eGFR >90 >60 mL/min/1.73m*2    Calcium 8.2 (L) 8.6 - 10.3 mg/dL    Phosphorus 3.2 2.5 - 4.9 mg/dL    Albumin 3.5 3.4 - 5.0 g/dL   POCT GLUCOSE   Result Value Ref Range    POCT Glucose 111 (H) 74 - 99 mg/dL   POCT GLUCOSE   Result Value Ref Range    POCT Glucose 137 (H) 74 - 99 mg/dL   POCT GLUCOSE   Result Value Ref Range    POCT Glucose 124 (H) 74 - 99 mg/dL   BLOOD GAS ARTERIAL FULL PANEL   Result Value Ref Range    POCT pH, Arterial 7.35 (L) 7.38 - 7.42 pH    POCT pCO2, Arterial 43 (H) 38 - 42 mm Hg    POCT pO2, Arterial 62 (L) 85 - 95 mm Hg    POCT SO2, Arterial 90 (L) 94 - 100 %    POCT Oxy Hemoglobin, Arterial 88.3 (L) 94.0 - 98.0 %    POCT Hematocrit Calculated, Arterial 39.0 (L) 41.0 - 52.0 %    POCT Sodium, Arterial 135 (L) 136 - 145 mmol/L    POCT  Potassium, Arterial 4.5 3.5 - 5.3 mmol/L    POCT Chloride, Arterial 104 98 - 107 mmol/L    POCT Ionized Calcium, Arterial 1.11 1.10 - 1.33 mmol/L    POCT Glucose, Arterial 132 (H) 74 - 99 mg/dL    POCT Lactate, Arterial 1.3 0.4 - 2.0 mmol/L    POCT Base Excess, Arterial -2.0 -2.0 - 3.0 mmol/L    POCT HCO3 Calculated, Arterial 23.7 22.0 - 26.0 mmol/L    POCT Hemoglobin, Arterial 13.1 (L) 13.5 - 17.5 g/dL    POCT Anion Gap, Arterial 12 10 - 25 mmo/L    Patient Temperature      FiO2 100 %   Blood Gas Arterial Full Panel   Result Value Ref Range    POCT pH, Arterial 7.36 (L) 7.38 - 7.42 pH    POCT pCO2, Arterial 42 38 - 42 mm Hg    POCT pO2, Arterial 71 (L) 85 - 95 mm Hg    POCT SO2, Arterial 92 (L) 94 - 100 %    POCT Oxy Hemoglobin, Arterial 90.6 (L) 94.0 - 98.0 %    POCT Hematocrit Calculated, Arterial 38.0 (L) 41.0 - 52.0 %    POCT Sodium, Arterial 132 (L) 136 - 145 mmol/L    POCT Potassium, Arterial 4.6 3.5 - 5.3 mmol/L    POCT Chloride, Arterial 104 98 - 107 mmol/L    POCT Ionized Calcium, Arterial 1.13 1.10 - 1.33 mmol/L    POCT Glucose, Arterial 128 (H) 74 - 99 mg/dL    POCT Lactate, Arterial 1.4 0.4 - 2.0 mmol/L    POCT Base Excess, Arterial -1.8 -2.0 - 3.0 mmol/L    POCT HCO3 Calculated, Arterial 23.7 22.0 - 26.0 mmol/L    POCT Hemoglobin, Arterial 12.6 (L) 13.5 - 17.5 g/dL    POCT Anion Gap, Arterial 9 (L) 10 - 25 mmo/L    Patient Temperature      FiO2 80 %   Magnesium   Result Value Ref Range    Magnesium 2.04 1.60 - 2.40 mg/dL   CBC   Result Value Ref Range    WBC 10.4 4.4 - 11.3 x10*3/uL    nRBC 0.0 0.0 - 0.0 /100 WBCs    RBC 4.16 (L) 4.50 - 5.90 x10*6/uL    Hemoglobin 12.6 (L) 13.5 - 17.5 g/dL    Hematocrit 38.5 (L) 41.0 - 52.0 %    MCV 93 80 - 100 fL    MCH 30.3 26.0 - 34.0 pg    MCHC 32.7 32.0 - 36.0 g/dL    RDW 13.9 11.5 - 14.5 %    Platelets 163 150 - 450 x10*3/uL   Renal Function Panel   Result Value Ref Range    Glucose 126 (H) 74 - 99 mg/dL    Sodium 134 (L) 136 - 145 mmol/L    Potassium 4.3 3.5 -  5.3 mmol/L    Chloride 105 98 - 107 mmol/L    Bicarbonate 24 21 - 32 mmol/L    Anion Gap 9 (L) 10 - 20 mmol/L    Urea Nitrogen 9 6 - 23 mg/dL    Creatinine 0.74 0.50 - 1.30 mg/dL    eGFR >90 >60 mL/min/1.73m*2    Calcium 8.2 (L) 8.6 - 10.3 mg/dL    Phosphorus 3.2 2.5 - 4.9 mg/dL    Albumin 3.9 3.4 - 5.0 g/dL   Coagulation Screen   Result Value Ref Range    Protime 13.5 (H) 9.8 - 12.8 seconds    INR 1.2 (H) 0.9 - 1.1    aPTT 31 27 - 38 seconds   POCT GLUCOSE   Result Value Ref Range    POCT Glucose 130 (H) 74 - 99 mg/dL   ECG 12 Lead   Result Value Ref Range    Ventricular Rate 76 BPM    Atrial Rate 76 BPM    WV Interval 134 ms    QRS Duration 86 ms    QT Interval 374 ms    QTC Calculation(Bazett) 420 ms    P Axis 42 degrees    R Axis -5 degrees    T Axis 11 degrees    QRS Count 13 beats    Q Onset 224 ms    P Onset 157 ms    P Offset 207 ms    T Offset 411 ms    QTC Fredericia 404 ms   POCT GLUCOSE   Result Value Ref Range    POCT Glucose 112 (H) 74 - 99 mg/dL    Anesthesia Intraoperative Transesophageal Echocardiogram    Result Date: 12/8/2023          [address] [address]  Tel [phone] Fax [phone]         [address] [address]  Tel [phone] Fax [phone] TRANSESOPHAGEAL ECHOCARDIOGRAM REPORT  Patient Name:      SHIELA Joiner Physician:   Soila Gonzalez MD Study Date:        12/7/2023           Ordering Provider:   Soila GONZALEZ MRN/PID:           [id number]            Fellow: Accession#:        [id number]        Nurse: Date of Birth/Age: 1965 / 58      Sonographer:         Naomi Vera RDCS                    years Gender:            M                   Additional Staff: Height:            185.42 cm           Admit Date: Weight:            97.52 kg            Admission Status:    Inpatient - Routine BSA:               2.22 m2             Department Location: Blood Pressure: 153 /64 mmHg Study  Type:    TRANSESOPHAGEAL ECHO (BECCA) Diagnosis/ICD: Atherosclerotic heart disease of native coronary artery without                angina pectoris-I25.10 Indication:    CAD CPT Codes:     BECCA Complete-76016  Study Detail: The following Echo studies were performed: 2D, Doppler and color               flow.  PHYSICIAN INTERPRETATION: BECCA Details: The BECCA probe used was [id number]. Technically adequate omniplane transesophageal echocardiogram performed. Color flow Doppler echo was performed to assess for the presence of a patent foramen ovale. BECCA Medication: The patient was sedated by Anesthesia; please refer to anesthesia flow sheet for medications used. BECCA Procedure: The probe was passed without difficulty. Left Ventricle: Left ventricular systolic function is normal. There are no regional wall motion abnormalities. The left ventricular cavity size is normal. Spectral Doppler shows a normal pattern of left ventricular diastolic filling. Left Atrium: The left atrium is normal in size. There is no evidence of a patent foramen ovale. There is a normal sized left atrial appendage. Right Ventricle: The right ventricle is normal in size. There is normal right ventricular global systolic function. Right Atrium: The right atrium is normal in size. Aortic Valve: The aortic valve was not assessed. There is trivial aortic valve regurgitation. Mitral Valve: The mitral valve is normal in structure. There is mild to moderate mitral valve regurgitation. Tricuspid Valve: The tricuspid valve is structurally normal. There is mild tricuspid regurgitation. Pulmonic Valve: The pulmonic valve is structurally normal. There is trace pulmonic valve regurgitation. Pericardium: There is a small pericardial effusion. Aorta: The aortic root is normal.  CONCLUSIONS:  1. Left ventricular systolic function is normal.  2. Mild to moderate mitral valve regurgitation. QUANTITATIVE DATA SUMMARY: M-MODE MEASUREMENTS:                          Normal Ranges:  IVSd:          0.98 cm   (0.6-1.1cm) LVPWd:         1.02 cm   (0.6-1.1cm) LVIDd:         4.00 cm   (3.9-5.9cm) LVIDs:         2.41 cm LV Mass Index: 57.0 g/m2 LV % FS        39.8 % LV SYSTOLIC FUNCTION BY 2D PLANIMETRY (MOD):                     Normal Ranges: EF-A2C View: 68.1 %  [id number] Blanquita Gonzalez MD Electronically signed on 12/8/2023 at 12:09:35 PM  ** Final **     ECG 12 Lead    Result Date: 12/8/2023  Normal sinus rhythm Inferior infarct , age undetermined Possible Anterior infarct , age undetermined Abnormal ECG When compared with ECG of 07-DEC-2023 13:42, Borderline criteria for Anterior infarct are now Present Inferior infarct is now Present    XR chest 1 view    Result Date: 12/8/2023  Interpreted By:  Dacia Wolf, STUDY: XR CHEST 1 VIEW;  12/8/2023 12:48 am   INDICATION: Signs/Symptoms:Hypoxia.   COMPARISON: Chest x-ray 12/07/2023   ACCESSION NUMBER(S): [id number]   ORDERING CLINICIAN: DIETER GOMEZ   FINDINGS: Interval removal of endotracheal and enteric tubes. Right IJ [place] catheter terminates in the right pulmonary artery. Mediastinal drain and bilateral chest tubes are stable in position. Midline sternotomy wires and epicardial leads noted. Overlying leads are also present.   CARDIOMEDIASTINAL SILHOUETTE: Cardiac silhouette is enlarged but stable.   LUNGS: Bandlike opacities in the right lung may relate to atelectasis. There is retrocardiac airspace opacity with blunting of the costophrenic angle which may relate to small effusion and atelectasis. No significant interval change. No definite pneumothorax.   ABDOMEN: No remarkable upper abdominal findings.   BONES: No acute osseous abnormality.       Bibasilar opacities may relate to small effusion and atelectasis. Superimposed infection not excluded. Attention on continued follow-up is advised.   Support hardware as described above.   MACRO: None   Signed by: Dacia Wolf 12/8/2023 1:22 AM Dictation workstation:   [id number]    ECG 12  Lead    Result Date: 12/7/2023  Normal sinus rhythm Normal ECG When compared with ECG of 06-DEC-2023 12:56, (unconfirmed) No significant change was found Confirmed by Mario Lennon ([id number]) on 12/7/2023 4:13:49 PM    XR chest 1 view    Result Date: 12/7/2023  Interpreted By:  Waldo Carmichael, STUDY: XR CHEST 1 VIEW;  12/7/2023 2:23 pm   INDICATION: Signs/Symptoms:Post op cardiac surgery.   COMPARISON: 12/07/2023 at 8:55 a.m..   ACCESSION NUMBER(S): [id number]   ORDERING CLINICIAN: BREANNE YAÑEZ   FINDINGS: CARDIOMEDIASTINAL SILHOUETTE: Endotracheal tube is now seen with tip projecting approximately 4.5 cm above the inna level. NG tube is now seen extending to the stomach level with tip not visualized. [place]-H[place] catheter from right jugular approach remains in place with tubing tip again projecting at the right main pulmonary artery level. There have been interval postoperative changes of the mediastinum with new median sternotomy wires and scattered surgical clips. Borderline size of the cardiac silhouette and mild aortic prominence is again seen, partially exaggerated by lower inspiratory volume. There is also a new cephalad directed midline probable mediastinal drain.   LUNGS: Bilateral cephalad directed chest tubes are now seen. Inspiratory volume is low. Elevation of the right hemidiaphragm again seen. New irregular regions of atelectasis and/or small infiltrates are seen in the perihilar regions bilaterally and left base. Small effusions not excluded. No appreciable pneumothorax.   ABDOMEN: No remarkable upper abdominal findings.   BONES: Bones are stable.       1.  Interval postoperative changes of the mediastinum with new life-support lines and tubes as detailed. 2. Low inspiratory volume with new irregular regions of atelectasis and or infiltrates in the perihilar regions bilaterally and left base.       MACRO: None.   Signed by: Waldo Carmichael 12/7/2023 3:09 PM Dictation workstation:    [id number]    XR chest 1 view    Result Date: 12/7/2023  Interpreted By:  Waldo Carmichael, STUDY: XR CHEST 1 VIEW;  12/7/2023 8:55 am   INDICATION: Signs/Symptoms:line placement.   COMPARISON: None.   ACCESSION NUMBER(S): [id number]   ORDERING CLINICIAN: NATHALIA TOLLIVER   FINDINGS: CARDIOMEDIASTINAL SILHOUETTE: There is a [place]-Herminia catheter from right jugular approach with tubing tip projecting near the right hilum at the right main pulmonary artery level. Cardiac silhouette is not significantly enlarged.   LUNGS: There is elevation of the right hemidiaphragm. Mild left basilar atelectasis is seen. No definite pleural effusion. No pneumothorax is seen.   ABDOMEN: No remarkable upper abdominal findings.   BONES: Multilevel endplate spurring present in the spine. Degenerative changes of the shoulders are partially visualized.       1.  [place]-H[place] catheter from right jugular approach has tubing tip projecting at the right main pulmonary artery level. 2. Mild left basilar atelectasis.       MACRO: None.   Signed by: Waldo Carmichael 12/7/2023 3:07 PM Dictation workstation:   [id number]                   Assessment/Plan   Principal Problem:    Coronary artery disease involving native coronary artery of native heart with unstable angina pectoris (CMS/HCC)  Triple Vessel CAD s/p CABG x 3: On pump CABG x3 LIMA-LAD, RA-OM, SVG-PDA  -Dr. Cheryl Murphy updated   -Remove swan, dari, montes  -Cordis and epicardial wires to remain in place  -CT removal after ambulation   -BB resumed metoprolol 50mg BID  -Imdur 30mg resumed  -Continue ASA and statin  -Lasix 20mg IV x 1 dose; keep MG >2 and K>4  -Daily CBC, BMP, MG  -Continue pain regimen with PRN Dilaudid,oxycodone, IV tylenol and norflex   -Bowel regimen as ordered, advance diet as tolerated   -GI ppx with protonix   -Okay for subcutaneous heparin once CT are removed   -Daily cxr and EKG  -Increase activity as tolerated; PT/OT following     Acute Blood Loss Anemia  Post-Op  -Baseline Hgb 14.0  POD#1: Hgb-12.6; Plts-163k  -Daily CBC  -Transfuse for Hgb goal > 7 or massive blood loss with hemodynamic instability     Post-Op Respiratory Insufficiency/Hypoxia  -Hx of PPD smoking, no documented pulmonary insufficiency   -Patient was extubated on 12/7 to NC, pt did desat to the 80s was put on CPAP overnight and given 20 mg IV Lasixs for diuresis now on 8 L NC   -Wean supplemental oxygen for spO2 goal > 92%  -ABG as needed  -TID duonebs  -Daily CXR       I spent 60 minutes in the professional and overall care of this patient.      Danielle Terrell, APRN-CNP

## 2023-12-08 NOTE — PROGRESS NOTES
Physical Therapy    Physical Therapy Evaluation    Patient Name: Liban Jaramillo  MRN: [id number]  Today's Date: 12/8/2023   Time Calculation  Start Time: 1446  Stop Time: 1504  Time Calculation (min): 18 min    Assessment/Plan   PT Assessment  Rehab Prognosis: Good  End of Session Communication: Bedside nurse  End of Session Patient Position: Alarm on, Up in chair  IP OR SWING BED PT PLAN  Inpatient or Swing Bed: Inpatient  PT Plan  Treatment/Interventions: Bed mobility, Transfer training, Gait training, Stair training, Balance training, Endurance training, Therapeutic activity  PT Plan: Skilled PT  PT Frequency: 4 times per week  PT Discharge Recommendations: Low intensity level of continued care  PT - OK to Discharge: Yes (pending progress towards PT goals and when medically stable)    Subjective     Current Problem:  Patient Active Problem List   Diagnosis    Hypothyroidism    Hypertension    Abnormal EKG    Family history of ischemic heart disease    [place] (dyspnea on exertion)    Pain of left upper extremity    Tobacco abuse    Neck pain    Radicular pain    Rotator cuff arthropathy, left    Snores    ANA (obstructive sleep apnea)    Bilateral carotid bruits    Peyronie disease    Erectile dysfunction    Coronary artery disease involving native coronary artery of native heart without angina pectoris    Type 2 diabetes mellitus with cardiac complication (CMS/HCC)    Coronary artery disease involving native coronary artery of native heart with unstable angina pectoris (CMS/HCC)       General Visit Information:  General  Reason for Referral: Recent cardiothoracic surgery  Referred By: Cesia  Past Medical History Relevant to Rehab: COPD, dyslipidemia, OA, HTN, MI, anxiety, PTSD, hypothyroidism, hernia repair, CAD, DM, smoker, cardiac cath, alcohol use, cervical disc disease, former IV drug use.  Family/Caregiver Present: No  Prior to Session Communication: Bedside nurse  Patient Position Received: Bed, 3 rail up (A-line,  tele, 8LO2)  General Comment: CABG x 3, L radial and R leg vein harvest, median sternotomy on 12/7/23 , worsening hypoxia post-op.    Home Living and Prior Level of Function:  Home Living  Home Living Comments: Lives with wife who works and daughter who is in school. No steps to enter. Pt. uses a basement bedroom and bathroom. 12 steps with no handrail to the basement. Tub/shower, no seat or safety bar. Independent with ADL. Drives. Wife manages all IADL. Pt. does not work.         Precautions:  Precautions  Medical Precautions: Fall precautions, Oxygen therapy device and L/min (MITT sternal precautions)  Precautions Comment: Pt. c/o double and triple vision during session.  Therapist notified RN.    Vital Signs:  Vital Signs  Heart Rate: 84  SpO2: 96 % (on 8L; 93% at end of session (in recliner))  Objective     Pain:  Pain Assessment  Pain Assessment: 0-10  Pain Score: 0 - No pain    Cognition:  Cognition  Overall Cognitive Status:  (very drowsy; easily agitated)  Orientation Level: Oriented X4    General Assessments:            Strength  Strength Comments: BLE 5/5; BUE WFL within precautions                   Functional Assessments:     Bed Mobility  Bed Mobility: Yes (sup to sit with modAx1; performed with HOB elevated this session; would benefit from log roll education and practice)  Transfers  Transfer: Yes (Sit to/from stand with modAx1; HHA upon standing; requires cues for MITT precautions) pt. c/o dizziness with OOB mobility; frequent cues provided to open eyes during mobility  Ambulation/Gait Training  Ambulation/Gait Training Performed: Yes (bed to chair with HHA and modAx1 + assist with lines; slow pace, decreased step length and foot clearance bilaterally)          Extremity/Trunk Assessments:        RLE   RLE : Within Functional Limits  LLE   LLE : Within Functional Limits    Outcome Measures:  [facility] Basic Mobility  Turning from your back to your side while in a flat bed without using bedrails: A  lot  Moving from lying on your back to sitting on the side of a flat bed without using bedrails: A lot  Moving to and from bed to chair (including a wheelchair): A lot  Standing up from a chair using your arms (e.g. wheelchair or bedside chair): A lot  To walk in hospital room: A lot  Climbing 3-5 steps with railing: Total  Basic Mobility - Total Score: 11                            Goals:  Encounter Problems       Encounter Problems (Active)       Impaired mobility s/p CABG        Perform all bed mobility with indep.        Start:  12/08/23    Expected End:  12/22/23            Perform all transfers with ww vs LRAD and mod. indep.        Start:  12/08/23    Expected End:  12/22/23            Patient will ambulate >/= 100 ft. with ww vs LRAD and mod. indep.        Start:  12/08/23    Expected End:  12/22/23            Patient will ascend/descend 12 steps with cane or crutch and supervision        Start:  12/08/23    Expected End:  12/22/23                 Education Documentation  No documentation found.  Education Comments  No comments found.

## 2023-12-09 ENCOUNTER — APPOINTMENT (OUTPATIENT)
Dept: RADIOLOGY | Facility: HOSPITAL | Age: 58
End: 2023-12-09
Payer: COMMERCIAL

## 2023-12-09 ENCOUNTER — APPOINTMENT (OUTPATIENT)
Dept: CARDIOLOGY | Facility: HOSPITAL | Age: 58
End: 2023-12-09
Payer: COMMERCIAL

## 2023-12-09 LAB
ALBUMIN SERPL BCP-MCNC: 3.7 G/DL (ref 3.4–5)
ANION GAP SERPL CALC-SCNC: 9 MMOL/L (ref 10–20)
APTT PPP: 32 SECONDS (ref 27–38)
BUN SERPL-MCNC: 11 MG/DL (ref 6–23)
CALCIUM SERPL-MCNC: 8.8 MG/DL (ref 8.6–10.3)
CHLORIDE SERPL-SCNC: 103 MMOL/L (ref 98–107)
CO2 SERPL-SCNC: 24 MMOL/L (ref 21–32)
CREAT SERPL-MCNC: 0.82 MG/DL (ref 0.5–1.3)
ERYTHROCYTE [DISTWIDTH] IN BLOOD BY AUTOMATED COUNT: 13.9 % (ref 11.5–14.5)
GFR SERPL CREATININE-BSD FRML MDRD: >90 ML/MIN/1.73M*2
GLUCOSE BLD MANUAL STRIP-MCNC: 140 MG/DL (ref 74–99)
GLUCOSE BLD MANUAL STRIP-MCNC: 146 MG/DL (ref 74–99)
GLUCOSE SERPL-MCNC: 154 MG/DL (ref 74–99)
HCT VFR BLD AUTO: 34.6 % (ref 41–52)
HGB BLD-MCNC: 11.5 G/DL (ref 13.5–17.5)
INR PPP: 1.3 (ref 0.9–1.1)
MAGNESIUM SERPL-MCNC: 1.92 MG/DL (ref 1.6–2.4)
MCH RBC QN AUTO: 30.4 PG (ref 26–34)
MCHC RBC AUTO-ENTMCNC: 33.2 G/DL (ref 32–36)
MCV RBC AUTO: 92 FL (ref 80–100)
NRBC BLD-RTO: 0 /100 WBCS (ref 0–0)
PHOSPHATE SERPL-MCNC: 1.9 MG/DL (ref 2.5–4.9)
PLATELET # BLD AUTO: 148 X10*3/UL (ref 150–450)
POTASSIUM SERPL-SCNC: 3.9 MMOL/L (ref 3.5–5.3)
PROTHROMBIN TIME: 14.3 SECONDS (ref 9.8–12.8)
RBC # BLD AUTO: 3.78 X10*6/UL (ref 4.5–5.9)
SODIUM SERPL-SCNC: 132 MMOL/L (ref 136–145)
WBC # BLD AUTO: 11.4 X10*3/UL (ref 4.4–11.3)

## 2023-12-09 PROCEDURE — 82947 ASSAY GLUCOSE BLOOD QUANT: CPT

## 2023-12-09 PROCEDURE — 2500000002 HC RX 250 W HCPCS SELF ADMINISTERED DRUGS (ALT 637 FOR MEDICARE OP, ALT 636 FOR OP/ED)

## 2023-12-09 PROCEDURE — 99223 1ST HOSP IP/OBS HIGH 75: CPT | Performed by: PSYCHIATRY & NEUROLOGY

## 2023-12-09 PROCEDURE — 37799 UNLISTED PX VASCULAR SURGERY: CPT

## 2023-12-09 PROCEDURE — 2500000005 HC RX 250 GENERAL PHARMACY W/O HCPCS

## 2023-12-09 PROCEDURE — 2500000001 HC RX 250 WO HCPCS SELF ADMINISTERED DRUGS (ALT 637 FOR MEDICARE OP): Performed by: EMERGENCY MEDICINE

## 2023-12-09 PROCEDURE — 85027 COMPLETE CBC AUTOMATED: CPT

## 2023-12-09 PROCEDURE — 2020000001 HC ICU ROOM DAILY

## 2023-12-09 PROCEDURE — 96372 THER/PROPH/DIAG INJ SC/IM: CPT

## 2023-12-09 PROCEDURE — 97530 THERAPEUTIC ACTIVITIES: CPT | Mod: GP,CQ

## 2023-12-09 PROCEDURE — 2500000001 HC RX 250 WO HCPCS SELF ADMINISTERED DRUGS (ALT 637 FOR MEDICARE OP)

## 2023-12-09 PROCEDURE — 71045 X-RAY EXAM CHEST 1 VIEW: CPT | Performed by: RADIOLOGY

## 2023-12-09 PROCEDURE — 93010 ELECTROCARDIOGRAM REPORT: CPT | Performed by: INTERNAL MEDICINE

## 2023-12-09 PROCEDURE — 71045 X-RAY EXAM CHEST 1 VIEW: CPT

## 2023-12-09 PROCEDURE — 2500000004 HC RX 250 GENERAL PHARMACY W/ HCPCS (ALT 636 FOR OP/ED): Performed by: NURSE PRACTITIONER

## 2023-12-09 PROCEDURE — 74018 RADEX ABDOMEN 1 VIEW: CPT

## 2023-12-09 PROCEDURE — 99233 SBSQ HOSP IP/OBS HIGH 50: CPT | Performed by: NURSE PRACTITIONER

## 2023-12-09 PROCEDURE — 2500000005 HC RX 250 GENERAL PHARMACY W/O HCPCS: Performed by: EMERGENCY MEDICINE

## 2023-12-09 PROCEDURE — 2500000004 HC RX 250 GENERAL PHARMACY W/ HCPCS (ALT 636 FOR OP/ED): Performed by: EMERGENCY MEDICINE

## 2023-12-09 PROCEDURE — 85610 PROTHROMBIN TIME: CPT

## 2023-12-09 PROCEDURE — 83735 ASSAY OF MAGNESIUM: CPT

## 2023-12-09 PROCEDURE — 99291 CRITICAL CARE FIRST HOUR: CPT

## 2023-12-09 PROCEDURE — 80069 RENAL FUNCTION PANEL: CPT

## 2023-12-09 PROCEDURE — 2500000004 HC RX 250 GENERAL PHARMACY W/ HCPCS (ALT 636 FOR OP/ED)

## 2023-12-09 PROCEDURE — 74018 RADEX ABDOMEN 1 VIEW: CPT | Performed by: RADIOLOGY

## 2023-12-09 PROCEDURE — 93005 ELECTROCARDIOGRAM TRACING: CPT

## 2023-12-09 PROCEDURE — 94640 AIRWAY INHALATION TREATMENT: CPT

## 2023-12-09 RX ORDER — LEVOTHYROXINE SODIUM 20 UG/ML
44 INJECTION, SOLUTION INTRAVENOUS DAILY
Status: DISCONTINUED | OUTPATIENT
Start: 2023-12-10 | End: 2023-12-10

## 2023-12-09 RX ORDER — ACETAMINOPHEN 160 MG/5ML
650 SOLUTION ORAL EVERY 4 HOURS PRN
Status: DISCONTINUED | OUTPATIENT
Start: 2023-12-09 | End: 2023-12-09

## 2023-12-09 RX ORDER — ACETAMINOPHEN 10 MG/ML
1000 INJECTION, SOLUTION INTRAVENOUS EVERY 6 HOURS SCHEDULED
Status: DISCONTINUED | OUTPATIENT
Start: 2023-12-10 | End: 2023-12-09

## 2023-12-09 RX ORDER — FUROSEMIDE 10 MG/ML
20 INJECTION INTRAMUSCULAR; INTRAVENOUS ONCE
Status: COMPLETED | OUTPATIENT
Start: 2023-12-09 | End: 2023-12-09

## 2023-12-09 RX ORDER — METOCLOPRAMIDE HYDROCHLORIDE 5 MG/ML
10 INJECTION INTRAMUSCULAR; INTRAVENOUS 3 TIMES DAILY
Status: DISCONTINUED | OUTPATIENT
Start: 2023-12-09 | End: 2023-12-11 | Stop reason: HOSPADM

## 2023-12-09 RX ORDER — LISINOPRIL 20 MG/1
20 TABLET ORAL DAILY
Status: DISCONTINUED | OUTPATIENT
Start: 2023-12-09 | End: 2023-12-11 | Stop reason: HOSPADM

## 2023-12-09 RX ORDER — ACETAMINOPHEN 10 MG/ML
1000 INJECTION, SOLUTION INTRAVENOUS EVERY 6 HOURS PRN
Status: DISCONTINUED | OUTPATIENT
Start: 2023-12-09 | End: 2023-12-10

## 2023-12-09 RX ORDER — ACETAMINOPHEN 325 MG/1
650 TABLET ORAL EVERY 4 HOURS PRN
Status: DISCONTINUED | OUTPATIENT
Start: 2023-12-09 | End: 2023-12-11 | Stop reason: HOSPADM

## 2023-12-09 RX ORDER — ACETAMINOPHEN 650 MG/1
650 SUPPOSITORY RECTAL EVERY 4 HOURS PRN
Status: DISCONTINUED | OUTPATIENT
Start: 2023-12-09 | End: 2023-12-09

## 2023-12-09 RX ORDER — GUAIFENESIN 600 MG/1
600 TABLET, EXTENDED RELEASE ORAL 2 TIMES DAILY PRN
Status: DISCONTINUED | OUTPATIENT
Start: 2023-12-09 | End: 2023-12-11 | Stop reason: HOSPADM

## 2023-12-09 RX ORDER — CLOPIDOGREL BISULFATE 75 MG/1
75 TABLET ORAL DAILY
Status: DISCONTINUED | OUTPATIENT
Start: 2023-12-09 | End: 2023-12-11 | Stop reason: HOSPADM

## 2023-12-09 RX ORDER — METOPROLOL TARTRATE 1 MG/ML
5 INJECTION, SOLUTION INTRAVENOUS EVERY 6 HOURS
Status: DISCONTINUED | OUTPATIENT
Start: 2023-12-10 | End: 2023-12-10

## 2023-12-09 RX ORDER — ASPIRIN 300 MG/1
150 SUPPOSITORY RECTAL DAILY
Status: DISCONTINUED | OUTPATIENT
Start: 2023-12-10 | End: 2023-12-10

## 2023-12-09 RX ORDER — FLUTICASONE PROPIONATE 50 MCG
2 SPRAY, SUSPENSION (ML) NASAL DAILY
Status: DISCONTINUED | OUTPATIENT
Start: 2023-12-09 | End: 2023-12-11 | Stop reason: HOSPADM

## 2023-12-09 RX ORDER — PHENYLEPHRINE HCL IN 0.9% NACL 1 MG/10 ML
SYRINGE (ML) INTRAVENOUS
Status: DISPENSED
Start: 2023-12-09 | End: 2023-12-09

## 2023-12-09 RX ORDER — SODIUM CHLORIDE, SODIUM LACTATE, POTASSIUM CHLORIDE, CALCIUM CHLORIDE 600; 310; 30; 20 MG/100ML; MG/100ML; MG/100ML; MG/100ML
75 INJECTION, SOLUTION INTRAVENOUS CONTINUOUS
Status: DISCONTINUED | OUTPATIENT
Start: 2023-12-09 | End: 2023-12-10

## 2023-12-09 RX ADMIN — ISOSORBIDE MONONITRATE 30 MG: 30 TABLET, EXTENDED RELEASE ORAL at 10:04

## 2023-12-09 RX ADMIN — SODIUM CHLORIDE, POTASSIUM CHLORIDE, SODIUM LACTATE AND CALCIUM CHLORIDE 75 ML/HR: 600; 310; 30; 20 INJECTION, SOLUTION INTRAVENOUS at 17:36

## 2023-12-09 RX ADMIN — GUAIFENESIN 600 MG: 600 TABLET, EXTENDED RELEASE ORAL at 14:55

## 2023-12-09 RX ADMIN — ASPIRIN 81 MG: 81 TABLET, CHEWABLE ORAL at 10:02

## 2023-12-09 RX ADMIN — CLOPIDOGREL BISULFATE 75 MG: 75 TABLET, FILM COATED ORAL at 14:47

## 2023-12-09 RX ADMIN — IPRATROPIUM BROMIDE AND ALBUTEROL SULFATE 3 ML: 2.5; .5 SOLUTION RESPIRATORY (INHALATION) at 14:00

## 2023-12-09 RX ADMIN — Medication 6 L/MIN: at 04:40

## 2023-12-09 RX ADMIN — POTASSIUM CHLORIDE 20 MEQ: 200 INJECTION, SOLUTION INTRAVENOUS at 05:55

## 2023-12-09 RX ADMIN — SODIUM PHOSPHATE, MONOBASIC, MONOHYDRATE AND SODIUM PHOSPHATE, DIBASIC, ANHYDROUS 30 MMOL: 142; 276 INJECTION, SOLUTION INTRAVENOUS at 09:59

## 2023-12-09 RX ADMIN — FUROSEMIDE 20 MG: 10 INJECTION, SOLUTION INTRAMUSCULAR; INTRAVENOUS at 10:04

## 2023-12-09 RX ADMIN — HEPARIN SODIUM 5000 UNITS: 5000 INJECTION INTRAVENOUS; SUBCUTANEOUS at 04:02

## 2023-12-09 RX ADMIN — IPRATROPIUM BROMIDE AND ALBUTEROL SULFATE 3 ML: 2.5; .5 SOLUTION RESPIRATORY (INHALATION) at 19:47

## 2023-12-09 RX ADMIN — METOCLOPRAMIDE HYDROCHLORIDE 10 MG: 5 INJECTION INTRAMUSCULAR; INTRAVENOUS at 17:35

## 2023-12-09 RX ADMIN — ACETYLCYSTEINE 600 MG: 200 SOLUTION ORAL; RESPIRATORY (INHALATION) at 19:47

## 2023-12-09 RX ADMIN — ACETAMINOPHEN 1000 MG: 10 INJECTION, SOLUTION INTRAVENOUS at 00:21

## 2023-12-09 RX ADMIN — GABAPENTIN 300 MG: 300 CAPSULE ORAL at 05:14

## 2023-12-09 RX ADMIN — FLUTICASONE PROPIONATE 2 SPRAY: 50 SPRAY, METERED NASAL at 10:45

## 2023-12-09 RX ADMIN — OXYCODONE HYDROCHLORIDE 10 MG: 5 TABLET ORAL at 10:39

## 2023-12-09 RX ADMIN — GABAPENTIN 300 MG: 300 CAPSULE ORAL at 14:47

## 2023-12-09 RX ADMIN — ACETYLCYSTEINE 600 MG: 200 SOLUTION ORAL; RESPIRATORY (INHALATION) at 14:00

## 2023-12-09 RX ADMIN — POLYETHYLENE GLYCOL 3350 17 G: 17 POWDER, FOR SOLUTION ORAL at 10:05

## 2023-12-09 RX ADMIN — ACETYLCYSTEINE 600 MG: 200 SOLUTION ORAL; RESPIRATORY (INHALATION) at 07:34

## 2023-12-09 RX ADMIN — MAGNESIUM SULFATE HEPTAHYDRATE 2 G: 40 INJECTION, SOLUTION INTRAVENOUS at 05:55

## 2023-12-09 RX ADMIN — HEPARIN SODIUM 5000 UNITS: 5000 INJECTION INTRAVENOUS; SUBCUTANEOUS at 14:47

## 2023-12-09 RX ADMIN — IPRATROPIUM BROMIDE AND ALBUTEROL SULFATE 3 ML: 2.5; .5 SOLUTION RESPIRATORY (INHALATION) at 07:34

## 2023-12-09 RX ADMIN — LIDOCAINE 1 PATCH: 4 PATCH TOPICAL at 10:02

## 2023-12-09 RX ADMIN — METOCLOPRAMIDE HYDROCHLORIDE 10 MG: 5 INJECTION INTRAMUSCULAR; INTRAVENOUS at 21:18

## 2023-12-09 RX ADMIN — HEPARIN SODIUM 5000 UNITS: 5000 INJECTION INTRAVENOUS; SUBCUTANEOUS at 21:18

## 2023-12-09 RX ADMIN — LEVOTHYROXINE SODIUM 88 MCG: 0.09 TABLET ORAL at 05:14

## 2023-12-09 RX ADMIN — METOPROLOL TARTRATE 50 MG: 50 TABLET, FILM COATED ORAL at 10:02

## 2023-12-09 RX ADMIN — PANTOPRAZOLE SODIUM 40 MG: 40 TABLET, DELAYED RELEASE ORAL at 05:14

## 2023-12-09 ASSESSMENT — PAIN - FUNCTIONAL ASSESSMENT
PAIN_FUNCTIONAL_ASSESSMENT: 0-10

## 2023-12-09 ASSESSMENT — COGNITIVE AND FUNCTIONAL STATUS - GENERAL
MOVING FROM LYING ON BACK TO SITTING ON SIDE OF FLAT BED WITH BEDRAILS: A LOT
MOVING TO AND FROM BED TO CHAIR: A LITTLE
WALKING IN HOSPITAL ROOM: A LITTLE
HELP NEEDED FOR BATHING: A LOT
TURNING FROM BACK TO SIDE WHILE IN FLAT BAD: A LOT
PERSONAL GROOMING: A LITTLE
DRESSING REGULAR LOWER BODY CLOTHING: A LOT
CLIMB 3 TO 5 STEPS WITH RAILING: TOTAL
CLIMB 3 TO 5 STEPS WITH RAILING: TOTAL
DRESSING REGULAR UPPER BODY CLOTHING: A LOT
PATIENT BASELINE BEDBOUND: NO
MOBILITY SCORE: 14
WALKING IN HOSPITAL ROOM: A LITTLE
STANDING UP FROM CHAIR USING ARMS: A LITTLE
TOILETING: A LOT
STANDING UP FROM CHAIR USING ARMS: A LITTLE
DAILY ACTIVITIY SCORE: 15
TURNING FROM BACK TO SIDE WHILE IN FLAT BAD: A LOT
MOVING TO AND FROM BED TO CHAIR: A LITTLE
MOBILITY SCORE: 14
MOVING FROM LYING ON BACK TO SITTING ON SIDE OF FLAT BED WITH BEDRAILS: A LOT

## 2023-12-09 ASSESSMENT — ENCOUNTER SYMPTOMS
CONFUSION: 0
FEVER: 0
FREQUENCY: 0
FACIAL ASYMMETRY: 0
SPEECH DIFFICULTY: 0
NUMBNESS: 0
COUGH: 0
HEADACHES: 1
WHEEZING: 0
PALPITATIONS: 0
JOINT SWELLING: 0
DIZZINESS: 0
ABDOMINAL PAIN: 0
SEIZURES: 0
TROUBLE SWALLOWING: 0
SINUS PRESSURE: 0
WEAKNESS: 1
HYPERACTIVE: 0
NAUSEA: 0
UNEXPECTED WEIGHT CHANGE: 0
EYE PAIN: 0
ARTHRALGIAS: 0
NECK PAIN: 0
VOMITING: 0
TREMORS: 0
BACK PAIN: 0
BRUISES/BLEEDS EASILY: 0
LIGHT-HEADEDNESS: 0
ADENOPATHY: 0
AGITATION: 0
DIFFICULTY URINATING: 0
PHOTOPHOBIA: 0
FATIGUE: 0
SHORTNESS OF BREATH: 0
HALLUCINATIONS: 0
SLEEP DISTURBANCE: 0
NECK STIFFNESS: 0

## 2023-12-09 ASSESSMENT — PAIN SCALES - GENERAL
PAINLEVEL_OUTOF10: 5 - MODERATE PAIN
PAINLEVEL_OUTOF10: 4
PAINLEVEL_OUTOF10: 9
PAINLEVEL_OUTOF10: 9

## 2023-12-09 ASSESSMENT — PAIN DESCRIPTION - LOCATION: LOCATION: STERNUM

## 2023-12-09 ASSESSMENT — PAIN DESCRIPTION - DESCRIPTORS: DESCRIPTORS: ACHING

## 2023-12-09 NOTE — CONSULTS
"History Of Present Illness  Liban Jaramillo is a 58 y.o. male presenting with double vision.  Patient underwent coronary bypass surgery 2 days ago and has been complaining of double vision in primary gaze and lateral gaze on the left side.  At the time of my evaluation patient denies any weakness numbness difficulty with speech and swallowing.    Please refer to the initial H&P ER records as well as progress note for details    Patient has a longstanding history of alcohol abuse chronic smoking in the past and traumatic brain injury as a child.          Past Medical History  Past Medical History:   Diagnosis Date    Anxiety     Arthritis     Cervical disc disease     COPD (chronic obstructive pulmonary disease) (CMS/Formerly [facility])     Coronary artery disease     Diabetes mellitus (CMS/Formerly [facility])     \"diet controlled\" and is not testing his glucose    Disease of thyroid gland     GERD (gastroesophageal reflux disease)     Hyperlipidemia     Hypertension     Myocardial infarction (CMS/Formerly [facility])     Nephrolithiasis     PTSD (post-traumatic stress disorder)     Skin disorder     states rash on occassion    Wears glasses     [place] teeth extracted      Surgical History  Past Surgical History:   Procedure Laterality Date    CARDIAC CATHETERIZATION      HERNIA REPAIR      TONSILLECTOMY      WISDOM TOOTH EXTRACTION       Social History  Social History     Tobacco Use    Smoking status: Every Day     Packs/day: 0.50     Years: 46.00     Additional pack years: 0.00     Total pack years: 23.00     Types: Cigarettes    Smokeless tobacco: Never   Vaping Use    Vaping Use: Never used   Substance Use Topics    Alcohol use: Yes     Alcohol/week: 3.0 standard drinks of alcohol     Types: 3 Cans of beer per week    Drug use: Not Currently     Comment: before everything     Allergies  Penicillins  Home Medications  Medications Prior to Admission   Medication Sig Dispense Refill Last Dose    aspirin 81 mg EC tablet Take 1 tablet (81 mg) by mouth once daily in " the morning. Take before meals. 90 tablet 1 Past Week    atorvastatin (Lipitor) 80 mg tablet Take 1 tablet (80 mg) by mouth once daily. 90 tablet 1 Past Week    isosorbide mononitrate ER (Imdur) 30 mg 24 hr tablet Take 1 tablet (30 mg) by mouth once daily. 90 tablet 1 Past Week    levothyroxine (Synthroid, Levoxyl) 88 mcg tablet Take 1 tablet (88 mcg) by mouth once daily. 60 tablet 0 Past Week    lisinopril 20 mg tablet Take 1 tablet (20 mg) by mouth once daily. 90 tablet 1 Past Week    metoprolol tartrate (Lopressor) 50 mg tablet Take 1 tablet by mouth 2 times a day. (Patient taking differently: Take 1 tablet by mouth once daily. 12/4/23 patient states he takes 50 mg daily in the morning) 180 tablet 1 Past Week    mupirocin (Bactroban) 2 % ointment As directed   12/7/2023 at 0600    nitroglycerin (Nitrostat) 0.4 mg SL tablet Place 1 tablet (0.4 mg) under the tongue every 5 minutes if needed for chest pain. 90 tablet 1 Past Week    ranolazine (Ranexa) 500 mg 12 hr tablet Take 1 tablet (500 mg) by mouth 2 times a day. 180 tablet 1 Past Week    Advair HFA 45-21 mcg/actuation inhaler Inhale 2 puffs 2 times a day as needed. States he uses it PRN   More than a month    blood sugar diagnostic strip 1 each once daily. 100 strip 3 Unknown    FreeStyle glucose monitoring kit 1 each 3 times a day as needed (for low blood sugar symptoms). 1 kit 0 Unknown    lancets misc 1 each once daily. 100 each 3 Unknown at f       Review of Systems   Constitutional:  Negative for fatigue, fever and unexpected weight change.   HENT:  Negative for dental problem, ear pain, hearing loss, sinus pressure, tinnitus and trouble swallowing.    Eyes:  Positive for visual disturbance. Negative for photophobia and pain.   Respiratory:  Negative for cough, shortness of breath and wheezing.    Cardiovascular:  Positive for chest pain. Negative for palpitations and leg swelling.   Gastrointestinal:  Negative for abdominal pain, nausea and vomiting.    Genitourinary:  Negative for difficulty urinating, enuresis and frequency.   Musculoskeletal:  Negative for arthralgias, back pain, joint swelling, neck pain and neck stiffness.   Skin:  Negative for pallor and rash.   Allergic/Immunologic: Negative for food allergies.   Neurological:  Positive for weakness and headaches. Negative for dizziness, tremors, seizures, syncope, facial asymmetry, speech difficulty, light-headedness and numbness.   Hematological:  Negative for adenopathy. Does not bruise/bleed easily.   Psychiatric/Behavioral:  Negative for agitation, behavioral problems, confusion, hallucinations and sleep disturbance. The patient is not hyperactive.        Physical Exam  HEENT reveals disconjugate gaze in the left frontal position.  Ear nose and throat was unremarkable    Neck was supple without lymphadenopathy    Cardiovascular exam is normal S1-S2 was difficult because of the recent scarring from surgery abdomen was soft distant with no organomegaly    Extremities did not reveal edema rash except for multiple tattoos.  No cyanosis.  Neurological Exam  Patient is alert awake oriented to place person and time.  Not in acute distress.  Speech was clear without paraphasic errors.  Memory was intact.  Attention span concentration judgment was appropriate.  No hallucinations or delusions..    Cranial nerves reveal disconjugate gaze where patient was able to move the eyes with 1 eye closed but with both eyes open patient did not have abduction of the right eye suggestive of internuclear ophthalmoplegia face was symmetrical with tongue in the midline and patient was able to elevate the palate and shoulder without any difficulty sensations were normal    Motor exam revealed normal tone and bulk and patient was able to move all 4 EXTR respiratory flexes her bilateral breast with plan to record response bilaterally    Coordination, Station and gait were deferred in view of patient in good condition    Sensory  "exam was intact to light touch and pinprick.      Last Recorded Vitals  Blood pressure 133/67, pulse 84, temperature 36.8 °C (98.2 °F), temperature source Temporal, resp. rate (!) 30, height 1.854 m (6' 0.99\"), weight 92 kg (202 lb 13.2 oz), SpO2 (!) 80 %.      Relevant Results  Recent Results (from the past 24 hour(s))   POCT GLUCOSE    Collection Time: 12/08/23  5:27 PM   Result Value Ref Range    POCT Glucose 119 (H) 74 - 99 mg/dL   POCT GLUCOSE    Collection Time: 12/08/23  8:07 PM   Result Value Ref Range    POCT Glucose 140 (H) 74 - 99 mg/dL   POCT GLUCOSE    Collection Time: 12/08/23 11:58 PM   Result Value Ref Range    POCT Glucose 138 (H) 74 - 99 mg/dL   POCT GLUCOSE    Collection Time: 12/09/23  3:27 AM   Result Value Ref Range    POCT Glucose 146 (H) 74 - 99 mg/dL   Magnesium    Collection Time: 12/09/23  3:28 AM   Result Value Ref Range    Magnesium 1.92 1.60 - 2.40 mg/dL   CBC    Collection Time: 12/09/23  3:28 AM   Result Value Ref Range    WBC 11.4 (H) 4.4 - 11.3 x10*3/uL    nRBC 0.0 0.0 - 0.0 /100 WBCs    RBC 3.78 (L) 4.50 - 5.90 x10*6/uL    Hemoglobin 11.5 (L) 13.5 - 17.5 g/dL    Hematocrit 34.6 (L) 41.0 - 52.0 %    MCV 92 80 - 100 fL    MCH 30.4 26.0 - 34.0 pg    MCHC 33.2 32.0 - 36.0 g/dL    RDW 13.9 11.5 - 14.5 %    Platelets 148 (L) 150 - 450 x10*3/uL   Renal Function Panel    Collection Time: 12/09/23  3:28 AM   Result Value Ref Range    Glucose 154 (H) 74 - 99 mg/dL    Sodium 132 (L) 136 - 145 mmol/L    Potassium 3.9 3.5 - 5.3 mmol/L    Chloride 103 98 - 107 mmol/L    Bicarbonate 24 21 - 32 mmol/L    Anion Gap 9 (L) 10 - 20 mmol/L    Urea Nitrogen 11 6 - 23 mg/dL    Creatinine 0.82 0.50 - 1.30 mg/dL    eGFR >90 >60 mL/min/1.73m*2    Calcium 8.8 8.6 - 10.3 mg/dL    Phosphorus 1.9 (L) 2.5 - 4.9 mg/dL    Albumin 3.7 3.4 - 5.0 g/dL   Coagulation Screen    Collection Time: 12/09/23  3:28 AM   Result Value Ref Range    Protime 14.3 (H) 9.8 - 12.8 seconds    INR 1.3 (H) 0.9 - 1.1    aPTT 32 27 - 38 " seconds   ECG 12 Lead    Collection Time: 12/09/23  8:28 AM   Result Value Ref Range    Ventricular Rate 86 BPM    Atrial Rate 86 BPM    NJ Interval 138 ms    QRS Duration 88 ms    QT Interval 354 ms    QTC Calculation(Bazett) 423 ms    P Axis 46 degrees    R Axis 2 degrees    T Axis 15 degrees    QRS Count 15 beats    Q Onset 226 ms    P Onset 157 ms    P Offset 207 ms    T Offset 403 ms    QTC Fredericia 399 ms                                 CMP:    Results from last 7 days   Lab Units 12/09/23  0328 12/08/23  0333 12/07/23  1407 12/06/23  1237   SODIUM mmol/L 132* 134* 138 140   POTASSIUM mmol/L 3.9 4.3 4.3 4.0   CHLORIDE mmol/L 103 105 108* 103   CO2 mmol/L 24 24 26 26   BUN mg/dL 11 9 9 10   CREATININE mg/dL 0.82 0.74 0.91 0.88   GLUCOSE mg/dL 154* 126* 119* 107*   PROTEIN TOTAL g/dL  --   --   --  7.2   CALCIUM mg/dL 8.8 8.2* 8.2* 9.6   BILIRUBIN TOTAL mg/dL  --   --   --  0.7   ALK PHOS U/L  --   --   --  56   AST U/L  --   --   --  39   ALT U/L  --   --   --  54*       Lipid Panel:  Results from last 7 days   Lab Units 12/06/23  1237   HDL mg/dL 34.1   CHOLESTEROL/HDL RATIO  4.3   VLDL mg/dL 31   TRIGLYCERIDES mg/dL 153*   NON HDL CHOL. mg/dL 111            MR brain wo IV contrast    Result Date: 12/9/2023  Interpreted By:  Shira Benson, STUDY: MR BRAIN WO IV CONTRAST; MR ORBIT WO IV CONTRAST;  12/8/2023 10:20 pm   INDICATION: Signs/Symptoms:diploplia, ataxia, r/o stroke; Signs/Symptoms:EOM Palsy.   COMPARISON: None.   ACCESSION NUMBER(S): [id number]; [id number]   ORDERING CLINICIAN: FERNANDA NG   TECHNIQUE: Multiplanar and multisequence MRI of the brain was performed without administration of intravenous contrast.   Additionally, multiplanar and multisequence MRI images of the orbits were obtained, without administration of intravenous contrast.   FINDINGS: MRI BRAIN:   A subtle 5 mm focus of diffusion restriction is present in the dorsal tegmentum of the david (series 7, image 14), to the  right of midline, immediately anterior to the 4th ventricle, to the right of midline, near the expected location of the right medial longitudinal fasciculus in the nucleus of cranial nerve 6. There is slight associated FLAIR hyperintense signal with minimal local mass effect, without evidence of hemorrhagic transformation.   No additional areas of abnormal diffusion restriction are identified intracranially.   There is no evidence of hemorrhage. No mass effect or midline shift. No abnormal hemosiderin staining is present intracranially.   There is no ventricular dilatation. Basal cisterns are patent. No extra-axial fluid collections are present.   Within limits of motion degraded exam, several subtle foci of hyperintense FLAIR and T2 signal present in the periventricular and subcortical white matter bilateral cerebral hemispheres are nonspecific, but favored to represent changes of microvascular disease.   Visualized large arterial flow voids, including intracranial carotid and basilar artery are preserved.   Small amount of T2 hyperintense fluid signal is present in the inferior left mastoid air cells, otherwise mastoid air cells are unremarkable in appearance.   MRI ORBITS:   Exam is somewhat degraded by motion.   Within limitations of the study, bony orbits are intact. Periorbital soft tissues, and intraorbital structures are symmetric in appearance.   No STIR hyperintense edema is present in the coronal or extraconal fat. Oculomotor muscles are unremarkable in appearance.   Optic nerves are symmetric in size without evidence of atrophy or STIR hyperintense edema bilaterally. Globes are symmetric in size and otherwise unremarkable.   Lacrimal glands are unremarkable in appearance.   There is near complete opacification of the hypoplastic left maxillary sinus with mucosal thickening and secretions present in the right maxillary sinus.       MRI BRAIN: 1. Subtle 5 mm focus of diffusion restriction consistent with  "acute to early subacute infarct is present in the dorsal tegmentum of the david, of the right of midline, immediately anterior to the 4th ventricle near the expected location of the right medial longitudinal fasciculus and right nucleus of cranial nerve 6. There is slight local mass effect without evidence of hemorrhagic transformation. 2. No additional areas of diffusion restriction or acute abnormalities are identified intracranially. 3. Within limitations of mildly motion degraded exam, several subtle foci of hyperintense FLAIR and T2 signal present in the periventricular and subcortical white matter of bilateral cerebral hemispheres are nonspecific, but favored to represent changes of microvascular disease.   MRI ORBITS: 1. No acute intraorbital pathology is identified. Optic nerves are symmetric in size without evidence of edema bilaterally, within limitations of somewhat motion degraded study.   MACRO: None   Signed by: Shira Benson 12/9/2023 12:36 AM Dictation workstation:   [id number]   No CT head results found for the past 14 days  No echocardiogram results found for the past 14 days      Results from last 7 days   Lab Units 12/06/23  1237   HEMOGLOBIN A1C % 6.3*     No results found for: \"BNP\"     I have personally reviewed the following imaging results XR abdomen 1 view    Result Date: 12/9/2023  Interpreted By:  Celestino Martinez, STUDY: XR ABDOMEN 1 VIEW;  12/9/2023 3:17 pm   INDICATION: Signs/Symptoms:hypoactive bowel sounds.   COMPARISON: None.   ACCESSION NUMBER(S): [id number]   ORDERING CLINICIAN: BREANNE YAÑEZ   FINDINGS: Mild distention of bowel segments, particularly the: Suggesting colonic ileus. Distal obstruction is not excluded. Follow-up as clinically warranted. The study is of limited evaluation of pneumoperitoneum on supine imaging, however no gross evidence of free air is noted.   The soft tissue shadows are unremarkable.   Visualized lungs bases are clear.   Osseous structures " demonstrate no acute bony changes.   Other findings: None significant       1.  Probable ileus   Signed by: Celestino Martinez 12/9/2023 3:22 PM Dictation workstation:   [id number]    ECG 12 Lead    Result Date: 12/9/2023  Normal sinus rhythm Low voltage QRS Borderline ECG When compared with ECG of 08-DEC-2023 06:32, (unconfirmed) No significant change was found    XR chest 1 view    Result Date: 12/9/2023  Interpreted By:  Fernanda Carmichael, STUDY: XR CHEST 1 VIEW;  12/9/2023 5:15 am   INDICATION: Signs/Symptoms:post op cabg.   COMPARISON: 12/08/2023.   ACCESSION NUMBER(S): [id number]   ORDERING CLINICIAN: BREANNE YAÑEZ   FINDINGS: CARDIOMEDIASTINAL SILHOUETTE: Right jugular line again seen with tip at the distal SVC level. Cardiomegaly, aortic calcifications and postoperative changes of the mediastinum are again seen.   LUNGS: Previously visualized multifocal infiltrates show some improvement from prior with residual irregular bilateral perihilar and left infrahilar retrocardiac infiltrate. Small pleural effusions not excluded. No appreciable pneumothorax.   ABDOMEN: No remarkable upper abdominal findings.   BONES: Bones are stable.       1.  Previously visualized multifocal infiltrates show some improvement from prior with residual irregular bilateral perihilar and left infrahilar retrocardiac infiltrates.       MACRO: None.   Signed by: Fernanda Carmichael 12/9/2023 6:40 AM Dictation workstation:   [id number]    MR orbit wo IV contrast    Result Date: 12/9/2023  Interpreted By:  Shira Benson, STUDY: MR BRAIN WO IV CONTRAST; MR ORBIT WO IV CONTRAST;  12/8/2023 10:20 pm   INDICATION: Signs/Symptoms:diploplia, ataxia, r/o stroke; Signs/Symptoms:EOM Palsy.   COMPARISON: None.   ACCESSION NUMBER(S): [id number]; [id number]   ORDERING CLINICIAN: FERNANDA NG   TECHNIQUE: Multiplanar and multisequence MRI of the brain was performed without administration of intravenous contrast.   Additionally, multiplanar  and multisequence MRI images of the orbits were obtained, without administration of intravenous contrast.   FINDINGS: MRI BRAIN:   A subtle 5 mm focus of diffusion restriction is present in the dorsal tegmentum of the david (series 7, image 14), to the right of midline, immediately anterior to the 4th ventricle, to the right of midline, near the expected location of the right medial longitudinal fasciculus in the nucleus of cranial nerve 6. There is slight associated FLAIR hyperintense signal with minimal local mass effect, without evidence of hemorrhagic transformation.   No additional areas of abnormal diffusion restriction are identified intracranially.   There is no evidence of hemorrhage. No mass effect or midline shift. No abnormal hemosiderin staining is present intracranially.   There is no ventricular dilatation. Basal cisterns are patent. No extra-axial fluid collections are present.   Within limits of motion degraded exam, several subtle foci of hyperintense FLAIR and T2 signal present in the periventricular and subcortical white matter bilateral cerebral hemispheres are nonspecific, but favored to represent changes of microvascular disease.   Visualized large arterial flow voids, including intracranial carotid and basilar artery are preserved.   Small amount of T2 hyperintense fluid signal is present in the inferior left mastoid air cells, otherwise mastoid air cells are unremarkable in appearance.   MRI ORBITS:   Exam is somewhat degraded by motion.   Within limitations of the study, bony orbits are intact. Periorbital soft tissues, and intraorbital structures are symmetric in appearance.   No STIR hyperintense edema is present in the coronal or extraconal fat. Oculomotor muscles are unremarkable in appearance.   Optic nerves are symmetric in size without evidence of atrophy or STIR hyperintense edema bilaterally. Globes are symmetric in size and otherwise unremarkable.   Lacrimal glands are unremarkable  in appearance.   There is near complete opacification of the hypoplastic left maxillary sinus with mucosal thickening and secretions present in the right maxillary sinus.       MRI BRAIN: 1. Subtle 5 mm focus of diffusion restriction consistent with acute to early subacute infarct is present in the dorsal tegmentum of the david, of the right of midline, immediately anterior to the 4th ventricle near the expected location of the right medial longitudinal fasciculus and right nucleus of cranial nerve 6. There is slight local mass effect without evidence of hemorrhagic transformation. 2. No additional areas of diffusion restriction or acute abnormalities are identified intracranially. 3. Within limitations of mildly motion degraded exam, several subtle foci of hyperintense FLAIR and T2 signal present in the periventricular and subcortical white matter of bilateral cerebral hemispheres are nonspecific, but favored to represent changes of microvascular disease.   MRI ORBITS: 1. No acute intraorbital pathology is identified. Optic nerves are symmetric in size without evidence of edema bilaterally, within limitations of somewhat motion degraded study.   MACRO: None   Signed by: Shira Benson 12/9/2023 12:36 AM Dictation workstation:   [id number]    MR brain wo IV contrast    Result Date: 12/9/2023  Interpreted By:  Shira Benson, STUDY: MR BRAIN WO IV CONTRAST; MR ORBIT WO IV CONTRAST;  12/8/2023 10:20 pm   INDICATION: Signs/Symptoms:diploplia, ataxia, r/o stroke; Signs/Symptoms:EOM Palsy.   COMPARISON: None.   ACCESSION NUMBER(S): [id number]; [id number]   ORDERING CLINICIAN: FERNANDA NG   TECHNIQUE: Multiplanar and multisequence MRI of the brain was performed without administration of intravenous contrast.   Additionally, multiplanar and multisequence MRI images of the orbits were obtained, without administration of intravenous contrast.   FINDINGS: MRI BRAIN:   A subtle 5 mm focus of diffusion  restriction is present in the dorsal tegmentum of the david (series 7, image 14), to the right of midline, immediately anterior to the 4th ventricle, to the right of midline, near the expected location of the right medial longitudinal fasciculus in the nucleus of cranial nerve 6. There is slight associated FLAIR hyperintense signal with minimal local mass effect, without evidence of hemorrhagic transformation.   No additional areas of abnormal diffusion restriction are identified intracranially.   There is no evidence of hemorrhage. No mass effect or midline shift. No abnormal hemosiderin staining is present intracranially.   There is no ventricular dilatation. Basal cisterns are patent. No extra-axial fluid collections are present.   Within limits of motion degraded exam, several subtle foci of hyperintense FLAIR and T2 signal present in the periventricular and subcortical white matter bilateral cerebral hemispheres are nonspecific, but favored to represent changes of microvascular disease.   Visualized large arterial flow voids, including intracranial carotid and basilar artery are preserved.   Small amount of T2 hyperintense fluid signal is present in the inferior left mastoid air cells, otherwise mastoid air cells are unremarkable in appearance.   MRI ORBITS:   Exam is somewhat degraded by motion.   Within limitations of the study, bony orbits are intact. Periorbital soft tissues, and intraorbital structures are symmetric in appearance.   No STIR hyperintense edema is present in the coronal or extraconal fat. Oculomotor muscles are unremarkable in appearance.   Optic nerves are symmetric in size without evidence of atrophy or STIR hyperintense edema bilaterally. Globes are symmetric in size and otherwise unremarkable.   Lacrimal glands are unremarkable in appearance.   There is near complete opacification of the hypoplastic left maxillary sinus with mucosal thickening and secretions present in the right maxillary  sinus.       MRI BRAIN: 1. Subtle 5 mm focus of diffusion restriction consistent with acute to early subacute infarct is present in the dorsal tegmentum of the david, of the right of midline, immediately anterior to the 4th ventricle near the expected location of the right medial longitudinal fasciculus and right nucleus of cranial nerve 6. There is slight local mass effect without evidence of hemorrhagic transformation. 2. No additional areas of diffusion restriction or acute abnormalities are identified intracranially. 3. Within limitations of mildly motion degraded exam, several subtle foci of hyperintense FLAIR and T2 signal present in the periventricular and subcortical white matter of bilateral cerebral hemispheres are nonspecific, but favored to represent changes of microvascular disease.   MRI ORBITS: 1. No acute intraorbital pathology is identified. Optic nerves are symmetric in size without evidence of edema bilaterally, within limitations of somewhat motion degraded study.   MACRO: None   Signed by: Shira Benson 12/9/2023 12:36 AM Dictation workstation:   [id number]    XR chest 1 view    Result Date: 12/8/2023  Interpreted By:  Coral Serrano, STUDY: XR CHEST 1 VIEW; ;   INDICATION: Signs/Symptoms:s/p ct removal.   COMPARISON: Chest radiograph 12/07/2023 and 12/08/2023. CT chest 12/06/2023.   ACCESSION NUMBER(S): [id number]   ORDERING CLINICIAN: YOLANDA SIN   FINDINGS: Median sternotomy. Right IJ central venous catheter with tip in the cavoatrial junction. Interval removal of right-sided chest tube. Hypoinflated lungs. Bibasilar and right mid lung atelectatic changes with linear opacities evident..   Cardiomediastinal silhouette is within normal limits for size and configuration. No sizable pleural effusion. No pneumothorax.       Please see findings   MACRO: None.   Signed by: Coral Serrano 12/8/2023 3:08 PM Dictation workstation:   [id number]    Anesthesia Intraoperative  Transesophageal Echocardiogram    Result Date: 12/8/2023          [address] [address]  Tel [phone] Fax [phone]         [address] [address]  Tel [phone] Fax [phone] TRANSESOPHAGEAL ECHOCARDIOGRAM REPORT  Patient Name:      SHIELA BRASWELL         Reading Physician:   [id number] Blanquita Gonzalez MD Study Date:        12/7/2023           Ordering Provider:   [id number] GONZALEZ MRN/PID:           [id number]            Fellow: Accession#:        [id number]        Nurse: Date of Birth/Age: 1965 / 58      Sonographer:         Naomi Vera RDCS                    years Gender:            M                   Additional Staff: Height:            185.42 cm           Admit Date: Weight:            97.52 kg            Admission Status:    Inpatient - Routine BSA:               2.22 m2             Department Location: Blood Pressure: 153 /64 mmHg Study Type:    TRANSESOPHAGEAL ECHO (BECCA) Diagnosis/ICD: Atherosclerotic heart disease of native coronary artery without                angina pectoris-I25.10 Indication:    CAD CPT Codes:     BECCA Complete-18328  Study Detail: The following Echo studies were performed: 2D, Doppler and color               flow.  PHYSICIAN INTERPRETATION: BECCA Details: The BECCA probe used was [id number]. Technically adequate omniplane transesophageal echocardiogram performed. Color flow Doppler echo was performed to assess for the presence of a patent foramen ovale. BECCA Medication: The patient was sedated by Anesthesia; please refer to anesthesia flow sheet for medications used. BECCA Procedure: The probe was passed without difficulty. Left Ventricle: Left ventricular systolic function is normal. There are no regional wall motion abnormalities. The left ventricular cavity size is normal. Spectral Doppler shows a normal pattern of left ventricular diastolic filling. Left Atrium: The left atrium is normal in size. There is no  evidence of a patent foramen ovale. There is a normal sized left atrial appendage. Right Ventricle: The right ventricle is normal in size. There is normal right ventricular global systolic function. Right Atrium: The right atrium is normal in size. Aortic Valve: The aortic valve was not assessed. There is trivial aortic valve regurgitation. Mitral Valve: The mitral valve is normal in structure. There is mild to moderate mitral valve regurgitation. Tricuspid Valve: The tricuspid valve is structurally normal. There is mild tricuspid regurgitation. Pulmonic Valve: The pulmonic valve is structurally normal. There is trace pulmonic valve regurgitation. Pericardium: There is a small pericardial effusion. Aorta: The aortic root is normal.  CONCLUSIONS:  1. Left ventricular systolic function is normal.  2. Mild to moderate mitral valve regurgitation. QUANTITATIVE DATA SUMMARY: M-MODE MEASUREMENTS:                          Normal Ranges: IVSd:          0.98 cm   (0.6-1.1cm) LVPWd:         1.02 cm   (0.6-1.1cm) LVIDd:         4.00 cm   (3.9-5.9cm) LVIDs:         2.41 cm LV Mass Index: 57.0 g/m2 LV % FS        39.8 % LV SYSTOLIC FUNCTION BY 2D PLANIMETRY (MOD):                     Normal Ranges: EF-A2C View: 68.1 %  [id number] Blanquita Gonzalez MD Electronically signed on 12/8/2023 at 12:09:35 PM  ** Final **     ECG 12 Lead    Result Date: 12/8/2023  Normal sinus rhythm Inferior infarct , age undetermined Possible Anterior infarct , age undetermined Abnormal ECG When compared with ECG of 07-DEC-2023 13:42, Borderline criteria for Anterior infarct are now Present Inferior infarct is now Present    XR chest 1 view    Result Date: 12/8/2023  Interpreted By:  Dacia Wolf, STUDY: XR CHEST 1 VIEW;  12/8/2023 12:48 am   INDICATION: Signs/Symptoms:Hypoxia.   COMPARISON: Chest x-ray 12/07/2023   ACCESSION NUMBER(S): [id number]   ORDERING CLINICIAN: DIETER GOMEZ   FINDINGS: Interval removal of endotracheal and enteric tubes. Right IJ  Newberry-Herminia catheter terminates in the right pulmonary artery. Mediastinal drain and bilateral chest tubes are stable in position. Midline sternotomy wires and epicardial leads noted. Overlying leads are also present.   CARDIOMEDIASTINAL SILHOUETTE: Cardiac silhouette is enlarged but stable.   LUNGS: Bandlike opacities in the right lung may relate to atelectasis. There is retrocardiac airspace opacity with blunting of the costophrenic angle which may relate to small effusion and atelectasis. No significant interval change. No definite pneumothorax.   ABDOMEN: No remarkable upper abdominal findings.   BONES: No acute osseous abnormality.       Bibasilar opacities may relate to small effusion and atelectasis. Superimposed infection not excluded. Attention on continued follow-up is advised.   Support hardware as described above.   MACRO: None   Signed by: Dacia Wolf 12/8/2023 1:22 AM Dictation workstation:   [id number]    ECG 12 Lead    Result Date: 12/7/2023  Normal sinus rhythm Normal ECG When compared with ECG of 06-DEC-2023 12:56, (unconfirmed) No significant change was found Confirmed by Mario Lennon ([id number]) on 12/7/2023 4:13:49 PM    ECG 12 lead (Clinic Performed)    Result Date: 12/7/2023  Normal sinus rhythm Inferior infarct (Cannot exclude) Abnormal ECG No previous ECGs available Confirmed by Mario Lennon ([id number]) on 12/7/2023 4:04:48 PM    XR chest 1 view    Result Date: 12/7/2023  Interpreted By:  Waldo Carmichael, STUDY: XR CHEST 1 VIEW;  12/7/2023 2:23 pm   INDICATION: Signs/Symptoms:Post op cardiac surgery.   COMPARISON: 12/07/2023 at 8:55 a.m..   ACCESSION NUMBER(S): [id number]   ORDERING CLINICIAN: BREANNE YAÑEZ   FINDINGS: CARDIOMEDIASTINAL SILHOUETTE: Endotracheal tube is now seen with tip projecting approximately 4.5 cm above the inna level. NG tube is now seen extending to the stomach level with tip not visualized. Newberry-Herminia catheter from right jugular approach remains in place with  tubing tip again projecting at the right main pulmonary artery level. There have been interval postoperative changes of the mediastinum with new median sternotomy wires and scattered surgical clips. Borderline size of the cardiac silhouette and mild aortic prominence is again seen, partially exaggerated by lower inspiratory volume. There is also a new cephalad directed midline probable mediastinal drain.   LUNGS: Bilateral cephalad directed chest tubes are now seen. Inspiratory volume is low. Elevation of the right hemidiaphragm again seen. New irregular regions of atelectasis and/or small infiltrates are seen in the perihilar regions bilaterally and left base. Small effusions not excluded. No appreciable pneumothorax.   ABDOMEN: No remarkable upper abdominal findings.   BONES: Bones are stable.       1.  Interval postoperative changes of the mediastinum with new life-support lines and tubes as detailed. 2. Low inspiratory volume with new irregular regions of atelectasis and or infiltrates in the perihilar regions bilaterally and left base.       MACRO: None.   Signed by: Waldo Carmichael 12/7/2023 3:09 PM Dictation workstation:   [id number]    XR chest 1 view    Result Date: 12/7/2023  Interpreted By:  Waldo Carmichael, STUDY: XR CHEST 1 VIEW;  12/7/2023 8:55 am   INDICATION: Signs/Symptoms:line placement.   COMPARISON: None.   ACCESSION NUMBER(S): [id number]   ORDERING CLINICIAN: NATHALIA TOLLIVER   FINDINGS: CARDIOMEDIASTINAL SILHOUETTE: There is a [place]-Herminia catheter from right jugular approach with tubing tip projecting near the right hilum at the right main pulmonary artery level. Cardiac silhouette is not significantly enlarged.   LUNGS: There is elevation of the right hemidiaphragm. Mild left basilar atelectasis is seen. No definite pleural effusion. No pneumothorax is seen.   ABDOMEN: No remarkable upper abdominal findings.   BONES: Multilevel endplate spurring present in the spine. Degenerative changes of  the shoulders are partially visualized.       1.  [place]-Herminia catheter from right jugular approach has tubing tip projecting at the right main pulmonary artery level. 2. Mild left basilar atelectasis.       MACRO: None.   Signed by: Waldo Carmichael 12/7/2023 3:07 PM Dictation workstation:   [id number]    CT chest wo IV contrast    Result Date: 12/6/2023  Interpreted By:  Schoenberger, Joseph, STUDY: CT CHEST WO IV CONTRAST;  12/6/2023 11:35 am   INDICATION: Signs/Symptoms:pre-operative cabg.   COMPARISON: None.   ACCESSION NUMBER(S): [id number]   ORDERING CLINICIAN: DENICE CARTY   TECHNIQUE: Helical data acquisition of the chest was obtained  without IV contrast material.  Images were reformatted in axial, coronal, and sagittal planes.   FINDINGS: LUNGS AND AIRWAYS: The trachea and central airways are patent. No endobronchial lesion.   There is a subpleural calcified nodule in the left lower lobe consistent with prior granulomatous disease. Measures less than 5 mm. There is no pleural effusion, pneumothorax, or airspace opacity. No other significant focal lung opacities are noted.   MEDIASTINUM AND NASRIN, LOWER NECK AND AXILLA: The visualized thyroid gland is within normal limits.   No evidence of thoracic lymphadenopathy by CT criteria.   Esophagus appears normal   HEART AND VESSELS: The the thoracic aorta is normal in course and caliber. The caliber of the ascending aorta approximates 3.5 cm. There are calcifications on the aortic valve. No calcifications of the ascending aorta. Mild calcifications in the arch and descending aorta.   Main pulmonary artery and its branches are normal in caliber.   There are severe coronary atherosclerotic calcification the study is not optimized for evaluation of coronary arteries.   The cardiac chambers are not enlarged.   No evidence of pericardial effusion.   UPPER ABDOMEN: The visualized subdiaphragmatic structures demonstrate no remarkable findings.   CHEST WALL AND  OSSEOUS STRUCTURES: There are no suspicious osseous lesions. Multilevel degenerative changes are present       1.  No significant ascending aortic calcifications. See discussion above   MACRO: None   Signed by: Joseph Schoenberger 12/6/2023 12:37 PM Dictation workstation:   [id number]    Transthoracic Echo (TTE) Complete    Result Date: 11/16/2023   [facility] [address], [address], [place] [address]            Tel [phone] Fax [phone] TRANSTHORACIC ECHOCARDIOGRAM REPORT  Patient Name:      SHIELA BRASWELL          Reading Physician:    [id number] Bello Gilbert MD, [facility] Study Date:        11/16/2023           Ordering Provider:    [id number] JOHN ODONNELL MRN/PID:           [id number]             Fellow: Accession#:        [id number]         Nurse: Date of Birth/Age: 1965 / 58 years Sonographer:          Deepali Cook RDMS,                                                               ISAURA, RVT Gender:            M                    Additional Staff: Height:            185.42 cm            Admit Date: Weight:            102.51 kg            Admission Status:     Outpatient BSA:               2.27 m2              Department Location:  [facility] Blood Pressure: 144 /94 mmHg Study Type:    TRANSTHORACIC ECHO (TTE) COMPLETE Diagnosis/ICD: Abnormal electrocardiogram [ECG] [EKG]-R94.31; Chest pain,                unspecified-R07.9; Other forms of dyspnea-R06.09; Family history                of ischemic heart disease and other diseases of the circulatory                system-Z82.49; Obstructive sleep apnea (adult) (pediatric)-G47.33 Indication:    Abn EKG, CP, SMALL[place]OD, Fa Hx CAD, LUE Pain, HTN, HLD, ANA and Smoker CPT Codes:     Echo Complete w Full Doppler-46563   Study Detail: The following Echo studies were performed: 2D, M-Mode, Doppler and               color flow.  PHYSICIAN INTERPRETATION: Left Ventricle: Left ventricular systolic function is hyperdynamic, with an estimated ejection fraction of 70-75%. There are no regional wall motion abnormalities. The left ventricular cavity size is normal. There is mild concentric left ventricular hypertrophy. Spectral Doppler shows a normal pattern of left ventricular diastolic filling. Normal diastolic function Mild concentric LVH. LV is hyperdynamic. LV outflow tract velocity 1.16 m/s secondary to hyperdynamic nature of the LV as opposed to focal outflow tract obstruction. Left Atrium: The left atrium is normal in size. Right Ventricle: The right ventricle is normal in size. There is normal right ventricular global systolic function. Normal RV size and function RVSP could not be calculated as no significant TR Doppler envelope. Right Atrium: The right atrium is normal in size. Aortic Valve: The aortic valve is trileaflet. There is no evidence of aortic valve regurgitation. The peak instantaneous gradient of the aortic valve is 10.4 mmHg. The mean gradient of the aortic valve is 5.0 mmHg. Mildly thickened aortic valve leaflets without significant impairment of mobility. There is slight increased velocity across the valve 1.6 m/s probably more related to the increased LV outflow tract velocity of 1.6 which is actually related to the hyperdynamic LV as opposed to focal outflow tract obstruction. Mitral Valve: The mitral valve is normal in structure. There is no evidence of mitral valve regurgitation. Normal mitral valve. Tricuspid Valve: The tricuspid valve is structurally normal. No evidence of tricuspid regurgitation. Pulmonic Valve: The pulmonic valve is structurally normal. There is no indication of pulmonic valve regurgitation. Pericardium: There is no pericardial effusion noted. Aorta: The aortic root is normal.  CONCLUSIONS:   1. Left ventricular systolic function is hyperdynamic with a 70-75% estimated ejection fraction.  2. Normal diastolic function     Mild concentric LVH. LV is hyperdynamic. LV outflow tract velocity 1.16 m/s secondary to hyperdynamic nature of the LV as opposed to focal outflow tract obstruction.  3. Normal RV size and function     RVSP could not be calculated as no significant TR Doppler envelope.  4. Normal mitral valve.  5. Mildly thickened aortic valve leaflets without significant impairment of mobility. There is slight increased velocity across the valve 1.6 m/s probably more related to the increased LV outflow tract velocity of 1.6 which is actually related to the hyperdynamic LV as opposed to focal outflow tract obstruction. QUANTITATIVE DATA SUMMARY: 2D MEASUREMENTS:                          Normal Ranges: Ao Root d:     3.70 cm   (2.0-3.7cm) LAs:           3.20 cm   (2.7-4.0cm) RVIDd:         3.45 cm   (0.9-3.6cm) IVSd:          1.07 cm   (0.6-1.1cm) LVPWd:         1.06 cm   (0.6-1.1cm) LVIDd:         5.08 cm   (3.9-5.9cm) LVIDs:         3.19 cm LV Mass Index: 89.7 g/m2 LV % FS        37.2 % LA VOLUME:                             Normal Ranges: LA Volume Index: 13.2 ml/m2 AORTA MEASUREMENTS:                    Normal Ranges: Asc Ao, d: 3.20 cm (2.1-3.4cm) LV SYSTOLIC FUNCTION BY 2D PLANIMETRY (MOD):                     Normal Ranges: EF-A4C View: 63.1 % (>=55%) LV DIASTOLIC FUNCTION:                        Normal Ranges: MV Peak E:    0.74 m/s (0.7-1.2 m/s) MV Peak A:    0.92 m/s (0.42-0.7 m/s) E/A Ratio:    0.80     (1.0-2.2) MV lateral e' 0.06 m/s MV medial e'  0.05 m/s MITRAL VALVE:                      Normal Ranges: MV Vmax:    1.27 m/s (<=1.3m/s) MV peak P.5 mmHg (<5mmHg) MV mean PG: 3.0 mmHg (<48mmHg) MV DT:      215 msec (150-240msec) MV PHT:     62 msec  (30-60msec) MVA by PHT: 3.55 cm2 (4-6cm2) AORTIC VALVE:                                    Normal Ranges: AoV Vmax:                1.61 m/s   (<=1.7m/s) AoV Peak PG:             10.4 mmHg (<20mmHg) AoV Mean P.0 mmHg  (1.7-11.5mmHg) LVOT Max Jarad:            1.16 m/s  (<=1.1m/s) AoV VTI:                 26.90 cm  (18-25cm) LVOT VTI:                21.20 cm LVOT Diameter:           2.50 cm   (1.8-2.4cm) AoV Area, VTI:           3.87 cm2  (2.5-5.5cm2) AoV Area,Vmax:           3.54 cm2  (2.5-4.5cm2) AoV Dimensionless Index: 0.79 PULMONIC VALVE:                      Normal Ranges: PV Max Jarad: 0.9 m/s  (0.6-0.9m/s) PV Max PG:  3.4 mmHg  [id number] Bello Gilbert MD, [facility] Electronically signed on 2023 at 5:38:25 PM  ** Final **     Vascular US Carotid Artery Duplex Bilateral    Result Date: 2023                 [address], [place] [address]          Tel [phone] Fax [phone]  [facility] Report  [facility] Patient Name:      SHIELA BRASWELL          Rhys Physician:  [id number] Bello Gilbert MD, [facility] Study Date:        2023           Ordering Provider:  [id number] JOHN ODONNELL MRN/PID:           [id number]             Fellow: Accession#:        [id number]         Technologist:       Deepali Cook RDMS,                                                             ISAURA, RVT Date of Birth/Age: 1965 / 58 years Technologist 2: Gender:            M                    Encounter#:         [id number] Admission Status:  Outpatient           Location Performed: [facility]  Diagnosis/ICD: Other specified symptoms and signs involving the circulatory and                respiratory systems-R09.89; Essential primary hypertension-I10 Indication:    Bilat Carotid Bruit, HTN, HLD and Smoker CPT Codes:     49337 Cerebrovascular Carotid Duplex scan complete  CONCLUSIONS: Right Carotid: Findings are consistent with less than 50% stenosis of the right  proximal internal carotid artery. Laminar flow seen by color Doppler. Visually at the origin of the right internal carotid artery is smooth plaque approximately 30% in severity. Right external carotid artery appears patent with no evidence of stenosis. No evidence of hemodynamically significant stenosis of the right common carotid artery. The right vertebral artery is patent with antegrade flow. Left Carotid: Findings are consistent with less than 50% stenosis of the left proximal internal carotid artery. Left external carotid artery appears patent with no evidence of stenosis. No evidence of hemodynamically significant stenosis of the left common carotid artery. The left vertebral artery is patent with antegrade flow.  Imaging & Doppler Findings: Right Plaque Morph: The proximal right internal carotid artery demonstrates smooth and calcified plaque. Left Plaque Morph: The proximal left internal carotid artery demonstrates smooth plaque.   Right                       Left   PSV      EDV               PSV      EDV 92 cm/s  21 cm/s   CCA P   77 cm/s  18 cm/s 102 cm/s 23 cm/s   CCA M   115 cm/s 28 cm/s 76 cm/s  21 cm/s   CCA D   94 cm/s  28 cm/s 129 cm/s 35 cm/s   ICA P   86 cm/s  30 cm/s 86 cm/s  27 cm/s   ICA M   87 cm/s  35 cm/s 52 cm/s  20 cm/s   ICA D   49 cm/s  19 cm/s 114 cm/s 23 cm/s    ECA    113 cm/s 27 cm/s 40 cm/s  9 cm/s  Vertebral 60 cm/s  19 cm/s                Right Left ICA/CCA Ratio  1.7  0.9   [id number] Bello Gilbert MD, FACC Electronically signed by [id number] Bello Gilbert MD, FACC on 11/16/2023 at 5:09:50 PM  ** Final **   .            Assessment/Plan   1.  History of diplopia most likely due to left pontine infarct most likely small vessel disease.    2.  History of coronary artery disease s/p coronary bypass surgery.    3.  History of chronic nicotine use as well as alcohol abuse in the past.    4.  History of traumatic brain injury as a child.    Plan.  Continue with aspirin and add Plavix 75 mg have PT and  OT to evaluate for gait and safety when medically stable.  Patient can be ambulated and signs symptoms risk factor of stroke bleeding risk and the precautions were discussed at great length with patient and his wife was at the bedside.  Will follow with you.    Due to technical limitations of voice recognition and human error, this note may not accurately reflect the care of the patient.          NIHSS (worst at presentation):     Vascular Risk Factor modification goals:  Blood pressure goals: avoid hypotension SBP <100 that could worsen cerebral perfusion,   Lipid Goals: education on healthy diet and   Glucose Goals: early treatment of hyperglycemia to goal glucose 140-180 mg/dl with long-term goal A1c < 7%   Smoking Cessation and Education  Assessment for Rehabilitation needs   Patient and family education on signs and symptoms of stroke, calling 911, healthy strategies for stroke prevention.           Due to technical limitations of voice recognition and human error, this note may not accurately reflect the care of the patient.   Darryn Bardales MD

## 2023-12-09 NOTE — PROGRESS NOTES
[facility] Critical Care Medicine       Date:  12/9/2023  Patient:  Liban Jaramillo  YOB: 1965  MRN:  [id number]   Admit Date:  12/7/2023  ========================================================================================================    S/P On Pump CABG x 3         History of Present Illness:  Liban Jaramillo is a 58 y.o. year old male patient with Past Medical History of hypertension, hyperlipidemia, hypothyroidism, tobacco use and CAD.  He was referred by cardiology for coronary bypass grafting. He initially presented to ED on 10/21/23 endorsing intermittent mid sternal chest pain unrelieved by nitroglycerin. He underwent cardiac catheterization that showed 90% stenosis in LAD, 95% stenosis in left circumflex as well as 50% stenosis in left main. Right coronary artery was without disease and LVEF was normal at 60%.  On 12/7, patient underwent on pump x 3 CABG, LIMA-LAD, RA-OM, SVG-PDA.           Interval ICU Events:  On 12/7, patient underwent on pump x 3 CABG, LIMA-LAD, RA-OM, SVG-PDA Cross clamp time was 57 min, on pump for 69 min. He returned to ICU in stable postoperative condition with Ventricular wires, 1 mediastinal chest tube, 1 R pleural chest tube and 1 left pleural chest tube, intubated and sedation on propofol. On arrival to ICU, pt was given 200 mcg of push dose epi for hypotension with great response.     12/8: POD #1CABG x 3. Patient is afebrile and hemodynamically stable (nicardipine was discontinued early this am) HR is NSR with rates in the 80s with no ectopy. He is 93% SP02 on HFNC. Pt was endorsing productive cough that is difficult to get up, given mucomyst this am. He states that pain is well controlled however he is insistent that Montes and chest tubes come out. He was told after ambulating today - okay to remove chest tubes and montes.     12/9: POD # 2. Pt began having L eye double vision last night with strabismus and CN3 palsy. 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 Neuro was consulted and they are recommending plavix at this time. Other wise patient remained hemodynamically stable, afebrile, and NSR with no ectopy. He was on 4L NC with SPO2 of 95%- however is refusing to wear Nasal Canula at this time and otherwise wanted to leave AMA, I discussed with him at length of importance of staying in the hospital post op and he reluctantly agreed.       Medical History:  Past Medical History:   Diagnosis Date    Anxiety     Arthritis     Cervical disc disease     COPD (chronic obstructive pulmonary disease) (CMS/HCC)     Coronary artery disease     Diabetes mellitus (CMS/HCC)     \"diet controlled\" and is not testing his glucose    Disease of thyroid gland     GERD (gastroesophageal reflux disease)     Hyperlipidemia     Hypertension     Myocardial infarction (CMS/HCC)     Nephrolithiasis     PTSD (post-traumatic stress disorder)     Skin disorder     states rash on occassion    Wears glasses     Stigler teeth extracted      Past Surgical History:   Procedure Laterality Date    CARDIAC CATHETERIZATION      HERNIA REPAIR      TONSILLECTOMY      WISDOM TOOTH EXTRACTION       Medications Prior to Admission   Medication Sig Dispense Refill Last Dose    aspirin 81 mg EC tablet Take 1 tablet (81 mg) by mouth once daily in the morning. Take before meals. 90 tablet 1 Past Week    atorvastatin (Lipitor) 80 mg tablet Take 1 tablet (80 mg) by mouth once daily. 90 tablet 1 Past Week    isosorbide mononitrate ER (Imdur) 30 mg 24 hr tablet Take 1 tablet (30 mg) by mouth once daily. 90 tablet 1 Past Week    levothyroxine (Synthroid, Levoxyl) 88 mcg tablet Take 1 tablet (88 mcg) by mouth once daily. 60 tablet 0 Past Week    lisinopril 20 mg tablet Take 1 tablet (20 mg) by mouth once daily. 90 tablet 1 Past Week    metoprolol tartrate (Lopressor) 50 mg tablet Take 1 tablet by mouth 2 times a day. (Patient " taking differently: Take 1 tablet by mouth once daily. 12/4/23 patient states he takes 50 mg daily in the morning) 180 tablet 1 Past Week    mupirocin (Bactroban) 2 % ointment As directed   12/7/2023 at 0600    nitroglycerin (Nitrostat) 0.4 mg SL tablet Place 1 tablet (0.4 mg) under the tongue every 5 minutes if needed for chest pain. 90 tablet 1 Past Week    ranolazine (Ranexa) 500 mg 12 hr tablet Take 1 tablet (500 mg) by mouth 2 times a day. 180 tablet 1 Past Week    Advair HFA 45-21 mcg/actuation inhaler Inhale 2 puffs 2 times a day as needed. States he uses it PRN   More than a month    blood sugar diagnostic strip 1 each once daily. 100 strip 3 Unknown    FreeStyle glucose monitoring kit 1 each 3 times a day as needed (for low blood sugar symptoms). 1 kit 0 Unknown    lancets misc 1 each once daily. 100 each 3 Unknown at f     Penicillins  Social History     Tobacco Use    Smoking status: Every Day     Packs/day: 0.50     Years: 46.00     Additional pack years: 0.00     Total pack years: 23.00     Types: Cigarettes    Smokeless tobacco: Never   Vaping Use    Vaping Use: Never used   Substance Use Topics    Alcohol use: Yes     Alcohol/week: 3.0 standard drinks of alcohol     Types: 3 Cans of beer per week    Drug use: Not Currently     Comment: before everything     Family History   Problem Relation Name Age of Onset    Diabetes Mother      Cancer Mother      Diabetes Father      Liver disease Father      Esophageal cancer Brother      Throat cancer Maternal Grandfather         [facility] Medications:        Current Facility-Administered Medications:     acetylcysteine (Mucomyst) 200 mg/mL (20 %) nebulizer solution 600 mg, 3 mL, nebulization, TID, Waldo Price DO, 600 mg at 12/09/23 0734    aspirin chewable tablet 81 mg, 81 mg, oral, Daily, Waldo Price DO, 81 mg at 12/08/23 0921    atorvastatin (Lipitor) tablet 80 mg, 80 mg, oral, Nightly, KENNEDY Abreu-CNP, 80 mg at 12/08/23 2013     bisacodyl (Dulcolax) EC tablet 10 mg, 10 mg, oral, Daily PRN, TAMEKA Abreu    dextrose 50 % injection 25 g, 25 g, intravenous, q15 min PRN **OR** glucagon (Glucagen) injection 1 mg, 1 mg, intramuscular, q15 min PRN, TAMEAK Zelaya    fluticasone (Flonase) nasal spray 2 spray, 2 spray, Each Nostril, Daily, TAMEKA Abreu    gabapentin (Neurontin) capsule 300 mg, 300 mg, oral, q8h BRANDON, KENNEDY Abreu-CNP, 300 mg at 12/09/23 0514    heparin (porcine) injection 5,000 Units, 5,000 Units, subcutaneous, q8h, KENNEDY Abreu-CNP, 5,000 Units at 12/09/23 0402    HYDROmorphone (Dilaudid) injection 0.4 mg, 0.4 mg, intravenous, q2h PRN, Waldo Price DO    ipratropium-albuteroL (Duo-Neb) 0.5-2.5 mg/3 mL nebulizer solution 3 mL, 3 mL, nebulization, TID, TAMEKA Abreu, 3 mL at 12/09/23 0734    isosorbide mononitrate ER (Imdur) 24 hr tablet 30 mg, 30 mg, oral, Daily, Waldo Price DO, 30 mg at 12/08/23 0921    labetaloL (Normodyne,Trandate) injection 10 mg, 10 mg, intravenous, q6h PRN, TAMEKA Julian, 10 mg at 12/08/23 2335    levothyroxine (Synthroid, Levoxyl) tablet 88 mcg, 88 mcg, oral, Daily, KENNEDY Abreu-CNP, 88 mcg at 12/09/23 0514    lidocaine 4 % patch 1 patch, 1 patch, transdermal, Daily, Waldo Price DO, 1 patch at 12/08/23 1257    [Held by provider] lisinopril tablet 20 mg, 20 mg, oral, Daily, Linnea Sharpnack, APRN-CNP    magnesium sulfate IV 2 g, 2 g, intravenous, q6h PRN, KENNEDY Abreu-CNP, Last Rate: 25 mL/hr at 12/09/23 0555, 2 g at 12/09/23 0555    magnesium sulfate IV 4 g, 4 g, intravenous, q6h PRN, TAMEKA Abreu    metoprolol tartrate (Lopressor) tablet 50 mg, 50 mg, oral, BID, Waldo Price DO, 50 mg at 12/08/23 2013    naloxone (Narcan) injection 0.2 mg, 0.2 mg, intravenous, q5 min PRN, TAMEKA Abreu    nicotine (Nicoderm CQ) 14 mg/24 hr patch 1 patch, 1 patch,  transdermal, Daily, Neetu Callaway APRN-CNP    ondansetron (Zofran) tablet 4 mg, 4 mg, oral, q8h PRN **OR** ondansetron (Zofran) injection 4 mg, 4 mg, intravenous, q8h PRN, Neetu Callaway APRN-CNP    oxyCODONE (Roxicodone) immediate release tablet 10 mg, 10 mg, oral, q4h PRN, Waldo Price DO    oxyCODONE (Roxicodone) immediate release tablet 5 mg, 5 mg, oral, q4h PRN, KENNEDY Abreu-CNP, 5 mg at 12/08/23 1019    oxygen (O2) therapy, , inhalation, Continuous PRN - O2/gases, KENNEDY Zelaya-CNP    oxygen (O2) therapy, , inhalation, Continuous PRN - O2/gases, KENNEDY Zelaya-CNP, 4 L/min at 12/09/23 0545    pantoprazole (ProtoNix) EC tablet 40 mg, 40 mg, oral, Daily before breakfast, 40 mg at 12/09/23 0514 **OR** pantoprazole (ProtoNix) injection 40 mg, 40 mg, intravenous, Daily before breakfast, KENNEDY Abreu-CNP, 40 mg at 12/07/23 1522    polyethylene glycol (Glycolax, Miralax) packet 17 g, 17 g, oral, Daily, Neetu Callaway APRN-CNP, 17 g at 12/08/23 0921    potassium chloride CR (Klor-Con M20) ER tablet 20 mEq, 20 mEq, oral, q6h PRN **OR** potassium chloride (Klor-Con) packet 20 mEq, 20 mEq, oral, q6h PRN, Neetu Callaway APRN-CNP    potassium chloride CR (Klor-Con M20) ER tablet 40 mEq, 40 mEq, oral, q6h PRN **OR** potassium chloride (Klor-Con) packet 40 mEq, 40 mEq, oral, q6h PRN, Neetu Callaway APRN-CNP    potassium chloride 20 mEq in 100 mL IV premix, 20 mEq, intravenous, q6h PRN, Neetu Callaway APRN-CNP, Last Rate: 50 mL/hr at 12/09/23 0555, 20 mEq at 12/09/23 0555    potassium chloride 40 mEq in 100 mL IV premix, 40 mEq, intravenous, q6h PRN, TAMEKA Abreu    sodium phosphate 30 mmol in sodium chloride 0.9% 250 mL IV, 30 mmol, intravenous, Once, KENNEDY Julian-CNP    Review of Systems:  14 point review of systems was completed and negative except for those specially mention in my HPI    Physical Exam:    Heart Rate:  [76-88]   Temp:   "[36.5 °C (97.7 °F)-37 °C (98.6 °F)]   Resp:  [19-35]   BP: (133-165)/(62-71)   Height:  [185.4 cm (6' 0.99\")]   Weight:  [92 kg (202 lb 13.2 oz)]   SpO2:  [91 %-97 %]     Physical Exam  Vitals and nursing note reviewed.   Constitutional:       Appearance: Normal appearance. He is well-developed.      Interventions: Nasal cannula in place.   HENT:      Head: Normocephalic and atraumatic.   Eyes:      General: Visual field deficit present.      Extraocular Movements:      Left eye: Abnormal extraocular motion present.      Comments: L eye palsy    Cardiovascular:      Rate and Rhythm: Normal rate and regular rhythm.      Pulses: Normal pulses.      Heart sounds: Normal heart sounds.   Pulmonary:      Effort: Pulmonary effort is normal.      Breath sounds: Examination of the right-lower field reveals rhonchi. Examination of the left-lower field reveals rhonchi. Rhonchi present.   Chest:      Comments: Midline incision with no erythema or drainage   Abdominal:      General: Abdomen is flat. Bowel sounds are decreased.      Palpations: Abdomen is soft.   Genitourinary:     Comments: Diggs draining clear yellow urine - will plan to remove today   Musculoskeletal:      Cervical back: Full passive range of motion without pain and normal range of motion.      Right lower leg: No edema.      Left lower leg: No edema.   Skin:     Comments: Radial and Saphenous harvesting site CDI, no erythema or drainage noted    Neurological:      General: No focal deficit present.      Mental Status: He is alert and oriented to person, place, and time.      Motor: Motor function is intact.      Coordination: Coordination is intact.      Gait: Gait is intact.   Psychiatric:         Attention and Perception: Perception normal.         Mood and Affect: Affect is angry.         Speech: Speech normal.         Behavior: Behavior is agitated.         Objective:    Results for orders placed or performed during the hospital encounter of 12/07/23 " (from the past 24 hour(s))   POCT GLUCOSE   Result Value Ref Range    POCT Glucose 112 (H) 74 - 99 mg/dL   POCT GLUCOSE   Result Value Ref Range    POCT Glucose 128 (H) 74 - 99 mg/dL   POCT GLUCOSE   Result Value Ref Range    POCT Glucose 119 (H) 74 - 99 mg/dL   POCT GLUCOSE   Result Value Ref Range    POCT Glucose 140 (H) 74 - 99 mg/dL   POCT GLUCOSE   Result Value Ref Range    POCT Glucose 138 (H) 74 - 99 mg/dL   POCT GLUCOSE   Result Value Ref Range    POCT Glucose 146 (H) 74 - 99 mg/dL   Magnesium   Result Value Ref Range    Magnesium 1.92 1.60 - 2.40 mg/dL   CBC   Result Value Ref Range    WBC 11.4 (H) 4.4 - 11.3 x10*3/uL    nRBC 0.0 0.0 - 0.0 /100 WBCs    RBC 3.78 (L) 4.50 - 5.90 x10*6/uL    Hemoglobin 11.5 (L) 13.5 - 17.5 g/dL    Hematocrit 34.6 (L) 41.0 - 52.0 %    MCV 92 80 - 100 fL    MCH 30.4 26.0 - 34.0 pg    MCHC 33.2 32.0 - 36.0 g/dL    RDW 13.9 11.5 - 14.5 %    Platelets 148 (L) 150 - 450 x10*3/uL   Renal Function Panel   Result Value Ref Range    Glucose 154 (H) 74 - 99 mg/dL    Sodium 132 (L) 136 - 145 mmol/L    Potassium 3.9 3.5 - 5.3 mmol/L    Chloride 103 98 - 107 mmol/L    Bicarbonate 24 21 - 32 mmol/L    Anion Gap 9 (L) 10 - 20 mmol/L    Urea Nitrogen 11 6 - 23 mg/dL    Creatinine 0.82 0.50 - 1.30 mg/dL    eGFR >90 >60 mL/min/1.73m*2    Calcium 8.8 8.6 - 10.3 mg/dL    Phosphorus 1.9 (L) 2.5 - 4.9 mg/dL    Albumin 3.7 3.4 - 5.0 g/dL   Coagulation Screen   Result Value Ref Range    Protime 14.3 (H) 9.8 - 12.8 seconds    INR 1.3 (H) 0.9 - 1.1    aPTT 32 27 - 38 seconds   ECG 12 Lead   Result Value Ref Range    Ventricular Rate 86 BPM    Atrial Rate 86 BPM    MO Interval 138 ms    QRS Duration 88 ms    QT Interval 354 ms    QTC Calculation(Bazett) 423 ms    P Axis 46 degrees    R Axis 2 degrees    T Axis 15 degrees    QRS Count 15 beats    Q Onset 226 ms    P Onset 157 ms    P Offset 207 ms    T Offset 403 ms    QTC Fredericia 399 ms      ECG 12 Lead    Result Date: 12/8/2023  Normal sinus rhythm  Inferior infarct , age undetermined Possible Anterior infarct , age undetermined Abnormal ECG When compared with ECG of 07-DEC-2023 13:42, Borderline criteria for Anterior infarct are now Present Inferior infarct is now Present    XR chest 1 view    Result Date: 12/8/2023  Interpreted By:  Dacia Wolf, STUDY: XR CHEST 1 VIEW;  12/8/2023 12:48 am   INDICATION: Signs/Symptoms:Hypoxia.   COMPARISON: Chest x-ray 12/07/2023   ACCESSION NUMBER(S): [id number]   ORDERING CLINICIAN: DIETER GOMEZ   FINDINGS: Interval removal of endotracheal and enteric tubes. Right IJ [place]-Herm[place] catheter terminates in the right pulmonary artery. Mediastinal drain and bilateral chest tubes are stable in position. Midline sternotomy wires and epicardial leads noted. Overlying leads are also present.   CARDIOMEDIASTINAL SILHOUETTE: Cardiac silhouette is enlarged but stable.   LUNGS: Bandlike opacities in the right lung may relate to atelectasis. There is retrocardiac airspace opacity with blunting of the costophrenic angle which may relate to small effusion and atelectasis. No significant interval change. No definite pneumothorax.   ABDOMEN: No remarkable upper abdominal findings.   BONES: No acute osseous abnormality.       Bibasilar opacities may relate to small effusion and atelectasis. Superimposed infection not excluded. Attention on continued follow-up is advised.   Support hardware as described above.   MACRO: None   Signed by: Dacia Wolf 12/8/2023 1:22 AM Dictation workstation:   [id number]    ECG 12 Lead    Result Date: 12/7/2023  Normal sinus rhythm Normal ECG When compared with ECG of 06-DEC-2023 12:56, (unconfirmed) No significant change was found Confirmed by Mario Lennon ([id number]) on 12/7/2023 4:13:49 PM    ECG 12 lead (Clinic Performed)    Result Date: 12/7/2023  Normal sinus rhythm Inferior infarct (Cannot exclude) Abnormal ECG No previous ECGs available Confirmed by Mario Lennon ([id number]) on 12/7/2023 4:04:48  PM    XR chest 1 view    Result Date: 12/7/2023  Interpreted By:  Waldo Carmichael, STUDY: XR CHEST 1 VIEW;  12/7/2023 2:23 pm   INDICATION: Signs/Symptoms:Post op cardiac surgery.   COMPARISON: 12/07/2023 at 8:55 a.m..   ACCESSION NUMBER(S): [id number]   ORDERING CLINICIAN: BREANNE YAÑEZ   FINDINGS: CARDIOMEDIASTINAL SILHOUETTE: Endotracheal tube is now seen with tip projecting approximately 4.5 cm above the inna level. NG tube is now seen extending to the stomach level with tip not visualized. [place]-Herminia catheter from right jugular approach remains in place with tubing tip again projecting at the right main pulmonary artery level. There have been interval postoperative changes of the mediastinum with new median sternotomy wires and scattered surgical clips. Borderline size of the cardiac silhouette and mild aortic prominence is again seen, partially exaggerated by lower inspiratory volume. There is also a new cephalad directed midline probable mediastinal drain.   LUNGS: Bilateral cephalad directed chest tubes are now seen. Inspiratory volume is low. Elevation of the right hemidiaphragm again seen. New irregular regions of atelectasis and/or small infiltrates are seen in the perihilar regions bilaterally and left base. Small effusions not excluded. No appreciable pneumothorax.   ABDOMEN: No remarkable upper abdominal findings.   BONES: Bones are stable.       1.  Interval postoperative changes of the mediastinum with new life-support lines and tubes as detailed. 2. Low inspiratory volume with new irregular regions of atelectasis and or infiltrates in the perihilar regions bilaterally and left base.       MACRO: None.   Signed by: Waldo Carmichael 12/7/2023 3:09 PM Dictation workstation:   [id number]    XR chest 1 view    Result Date: 12/7/2023  Interpreted By:  Waldo Carmichael, STUDY: XR CHEST 1 VIEW;  12/7/2023 8:55 am   INDICATION: Signs/Symptoms:line placement.   COMPARISON: None.   ACCESSION  NUMBER(S): [id number]   ORDERING CLINICIAN: NATHALIA TOLLIVER   FINDINGS: CARDIOMEDIASTINAL SILHOUETTE: There is a [place]-Herminia catheter from right jugular approach with tubing tip projecting near the right hilum at the right main pulmonary artery level. Cardiac silhouette is not significantly enlarged.   LUNGS: There is elevation of the right hemidiaphragm. Mild left basilar atelectasis is seen. No definite pleural effusion. No pneumothorax is seen.   ABDOMEN: No remarkable upper abdominal findings.   BONES: Multilevel endplate spurring present in the spine. Degenerative changes of the shoulders are partially visualized.       1.  [place]-Herminia catheter from right jugular approach has tubing tip projecting at the right main pulmonary artery level. 2. Mild left basilar atelectasis.       MACRO: None.   Signed by: aWldo Carmichael 12/7/2023 3:07 PM Dictation workstation:   [id number]    CT chest wo IV contrast    Result Date: 12/6/2023  Interpreted By:  Schoenberger, Joseph, STUDY: CT CHEST WO IV CONTRAST;  12/6/2023 11:35 am   INDICATION: Signs/Symptoms:pre-operative cabg.   COMPARISON: None.   ACCESSION NUMBER(S): [id number]   ORDERING CLINICIAN: DENICE CARTY   TECHNIQUE: Helical data acquisition of the chest was obtained  without IV contrast material.  Images were reformatted in axial, coronal, and sagittal planes.   FINDINGS: LUNGS AND AIRWAYS: The trachea and central airways are patent. No endobronchial lesion.   There is a subpleural calcified nodule in the left lower lobe consistent with prior granulomatous disease. Measures less than 5 mm. There is no pleural effusion, pneumothorax, or airspace opacity. No other significant focal lung opacities are noted.   MEDIASTINUM AND NASRIN, LOWER NECK AND AXILLA: The visualized thyroid gland is within normal limits.   No evidence of thoracic lymphadenopathy by CT criteria.   Esophagus appears normal   HEART AND VESSELS: The the thoracic aorta is normal in course and caliber.  The caliber of the ascending aorta approximates 3.5 cm. There are calcifications on the aortic valve. No calcifications of the ascending aorta. Mild calcifications in the arch and descending aorta.   Main pulmonary artery and its branches are normal in caliber.   There are severe coronary atherosclerotic calcification the study is not optimized for evaluation of coronary arteries.   The cardiac chambers are not enlarged.   No evidence of pericardial effusion.   UPPER ABDOMEN: The visualized subdiaphragmatic structures demonstrate no remarkable findings.   CHEST WALL AND OSSEOUS STRUCTURES: There are no suspicious osseous lesions. Multilevel degenerative changes are present       1.  No significant ascending aortic calcifications. See discussion above   MACRO: None   Signed by: Joseph Schoenberger 12/6/2023 12:37 PM Dictation workstation:   [id number]      FiO2 (%):  [52 %] 52 %      Intake/Output Summary (Last 24 hours) at 12/9/2023 0925  Last data filed at 12/9/2023 0420  Gross per 24 hour   Intake 500 ml   Output 1768 ml   Net -1268 ml           Assessment/Plan:    I am currently managing this critically ill patient for the following problems:    Post Op Pain  Acute vs Subacute infarct of Lesa   Hx of Nicotine abuse  Hx of former IV drug use  Hx of HTN  Triple Vessel CAD s/p CABG x 3  Hyperglycemia   Post Op Respiratory Insufficiency     Neuro/Psych/Pain Ctrl/Sedation:  Post-Op Pain  Former IV drug use  Hx of Nicotine abuse   Acute vs Subacute infarct of Lesa   -Incisional pain  -PRN Dilaudid,oxycodone, tylenol and gabapentin   -Neuro consulted for infarct, recommending plavix- okay to start per CTS   -Neurochecks per ICU protocol     Respiratory/ENT:  Post-Op Respiratory Insufficiency  -Hx of PPD smoking, no documented pulmonary insufficiency   -Patient was extubated on 12/7 to NC  -Given 20 mg IV Lasix for diuresis   -Refusing to wear NC however when on NC SPO2  above 92%  -Wean supplemental oxygen for spO2  "goal > 92%  -ABG as needed  -TID duonebs  -Daily CXR    Cardiovascular:  Triple Vessel CAD s/p CABG x 3  Patient underwent on pump CABG x3 LIMA-LAD, RA-OM, SVG-PDA.   -V wires cut 12/8 in prep for MRI   -2 pleural, 1 mediastinal chest tube removed 12/8  -Daily BMP, keep K >4, Mg > 2  -Start aspirin and statin POD #1  -PT/OT consult and OOB POD #1  -CTS following and appreciate further management       GI:  No active issues   PPI for GI prophylaxis  Bowel regimen  KUB if no bowel movement/flatus by this afternoon     Renal/Volume Status (Intra & Extravascular):  Daily CMP  Keep MG >2 and K >4  Replace PRN per protocol   Hourly Is and Os  Maintain Diggs Cathete    Endocrine  Hyperglycemia  -No history of DM, likely stress induced  -Strict blood glucose control post-operatively  -Glucose goal   -Insulin gtt vs SSI per ICU protoco    Infectious Disease:  Leukocytosis 13.9--11.4  likely reactive  Trend CBC  Monitor for sx of SIRS    Heme/Onc:  Acute Blood Loss Anemia Post-Op  -Baseline Hgb 14.0 today 11.5  -Daily CBC  -Transfuse for Hgb goal > 7 or massive blood loss with hemodynamic instability     OBGYN/MSK:  PT and OT POD # 1  OOB as tolerated       Ethics/Code Status:  Full Code     [facility] Cardiothoracic Surgery/ICU Quality Check      New Onset Organ Failure (ATA, Shock, ALI etc): No  >2 Vasopressors/Inotropes (Levophed > 0.1mcg/kg/min + Vasopressin) for more than 8 hours: No  Ongoing Blood Product Transfusion: No  Sepsis/New Infection: No  Delirium: No- slightly agitated will try PRN atarax   Readmission to ICU: No  Family/Patient Concerns: concerned for rehab for eye sight following stroke. Patient was given eye patch for time being.     If any \"yes\" to the above, action plan as noted below: Neurology following for infarct - CTS okay with plan- discussed with both Dr Cheryl Murphy and Dr Price       :  DVT Prophylaxis: will plan to start Lovenox this afternoon  GI Prophylaxis: PPI  Bowel " Regimen: miralax, ducalox, suppository PRN  Diet: Will advance as tolerated   CVC: yes- will plan to remove  [place]: yes  Diggs: yes- will plan to remove  Restraints: none  Dispo: ICU    Critical Care Time:  45    Plan Discussed with Dr. Claribel BENAVIDES, CNP  Critical Care Medicine   [facility]

## 2023-12-09 NOTE — PROGRESS NOTES
Liban Jaramillo is a 58 y.o. male on day 2 of admission presenting with Coronary artery disease involving native coronary artery of native heart with unstable angina pectoris (CMS/HCC).    Subjective   Liban Celeste is a 58 y.o. male with past medical history of hypertension, hyperlipidemia, hypothyroidism, substance abuse, smoker, cervical stenosis and radiculopathy and left rotator cuff injury. Over the last several months he has noted increasing symptoms of chest pain and dyspnea on exertion. He was evaluated at [facility] on 10/23/2023 for symptoms of recurrent chest pain.  At that time laboratory studies were unremarkable except for his elevated TSH. He was then seen by Dr. Rodriguez and was scheduled for cardiac catheterization. In the interim he did have an episode of recurrent angina and required evaluation at [facility] emergency room where he was seen by Dr. Casillas who performed cardiac catheterization there. Cardiac cath showed 90% LAD stenosis and 95% left circumflex stenosis as well as 50% left main, right coronary was without significant disease and left ventricular ejection fraction at that time was normal at 60%. Dr. Casillas felt that his findings warranted bypass surgery and he was referred to cardiovascular surgery at [facility].     On 12/7, patient underwent elective CABG x 3; LIMA-LAD, RA-OM, SVG-PDA. Cross clamp time was 57 min, on pump for 69 min. He returned to ICU in stable postoperative condition with Ventricular wires, 1 mediastinal chest tube, 1 R pleural chest tube and 1 left pleural chest tube, intubated and sedated on propofol.     POD#1: Pt was extubated yesterday evening without complication. He did have episode of concern for worsening hypoxia, agitation and severe pain in upper back late last night. At that time he was given 0.5 mg hydromorphone with mild improvement in his pain but remained intermittently agitated and hypoxic on non-rebreather with spo2 88-90%. ABG was  drawn which showed pH 7.35, pCO2 43, pO2 62, SaO2 90 on 100% FiO2. Stat x-ray was performed which looks similar in appearance to the prior x-ray, chest tubes in appropriate position, patient with multiple areas of atelectasis and low inspiratory volume. He was placed on CPAP to help with lung recruitment overnight and was able to be weaned off to 8L this AM. On exam this morning he remains afebrile and hemodynamically stable. Cardene infusion was weaned off and Metoprolol tartrate 50mg BID was resumed. Imdur 30mg daily also resumed. He is maintaining adequate oxygen saturation on 8L NC. CXR this morning showing bandlike opacities in the right lung may relate to atelectasis. There is retrocardiac airspace opacity with blunting of the costophrenic angle which may relate to small effusion and atelectasis. No significant interval change. No definite pneumothorax. He did receive Lasix 20mg IV x 1 dose this morning will good response. Will encourage IS and deep breathing frequently. Pain regimen adjusted to patent level of comfort. On exam he was very anxious and complaining of moderate back pain. Will continue Ofirmev IV q 6hrs scheduled x 4 doses, dilaudid 0.4mg q2rs prn, for breakthrough pain, Roxicodone 5-10mg q4hrs PRN will add gabapentin 300mg q8hrs scheduled and lidocaine patch q12hrs. Will continue bowel regimen as ordered, he states he has not passed any flatus. Can increase diet as tolerated once pt starts to pass gas. Denies n/v. Chest tubes with trivial serosanguinous drainage. Can remove chest tubes after patient ambulates and there is no evidence of dumping, Okay to start heparin subcutaneous once CT are removed. Increase activity as tolerated; PT/OT following.     POD#2: Yesterday around 1700 patient complaining of double vision. When testing his extraocular muscles he appeared to have a left 6th nerve palsy. Double vision resolves when left eye covered. He has 5 out of 5 motor strength in all extremities.  Sensory is intact on his face and extremities. Decision made to cut pacing wires and obtained a stat MRI as the patient high risk for stroke. MRI did show subtle 5 mm focus of diffusion restriction consistent with acute to early subacute infarct is present in the dorsal tegmentum of the david, of the right of midline, immediately anterior to the 4th ventricle near the expected location of the right medial longitudinal fasciculus and right nucleus of cranial nerve 6. There is slight local mass effect without evidence of hemorrhagic transformation. Neurology consulted awaiting further recommendations. He remains afebrile and hemodynamically stable. He is maintaining adequate oxygen saturation on 4L NC. Of note he has been refusing to wear 02. CXR this morning showing previously visualized multifocal infiltrates show some improvement from prior with residual irregular bilateral perihilar and left infrahilar retrocardiac infiltrates. Agree with diuresis again today with Lasix 20mg x 1 dose. Pain is controlled on current regimen. Patient states he is not passing gas but is able to tolerate diet. Denies n/v and abdominal pain. Will obtain KUB to r/o any obstruction or ileus. Continue to increase activity as tolerated. Will keep in ICU over weekend for close monitoring.   Objective     Physical Exam  Vitals and nursing note reviewed.   Constitutional:       General: He is not in acute distress.     Appearance: Normal appearance. He is well-developed. He is not ill-appearing.   HENT:      Head: Normocephalic and atraumatic.   Eyes:      General: Visual field deficit present.      Extraocular Movements:      Left eye: Abnormal extraocular motion present.      Pupils: Pupils are equal, round, and reactive to light. Pupils are equal.      Comments: Left 6th nerve palsy    Neck:      Thyroid: No thyroid mass.      Vascular: No JVD.      Comments: DOMINIQUE patel removed 12/8    RIJ introducer   Cardiovascular:      Rate and Rhythm:  "Normal rate and regular rhythm.      Pulses:           Posterior tibial pulses are 2+ on the right side and 2+ on the left side.      Heart sounds: Normal heart sounds.      Comments: Wires clipped   Pulmonary:      Effort: Pulmonary effort is normal.      Breath sounds: Decreased air movement present.   Chest:      Comments: Mid sternal incision DSD intact, no evidence of bleeding or erythema  Chest tubes removed 12/8  Abdominal:      General: Bowel sounds are decreased.      Palpations: Abdomen is soft.      Comments: Patient states he is not passing flatus yet    Genitourinary:     Comments: Diggs removed 12/8  Musculoskeletal:      Right lower leg: No edema.      Left lower leg: No edema.      Comments: Generalized post-op weakness  5/5 motor strength in all extremities    Skin:     General: Skin is warm.   Neurological:      General: No focal deficit present.      Mental Status: He is alert and oriented to person, place, and time.   Psychiatric:         Attention and Perception: Attention normal.         Mood and Affect: Mood is anxious. Affect is angry.         Speech: Speech normal.         Behavior: Behavior is uncooperative and agitated.         Last Recorded Vitals  Blood pressure 133/67, pulse 84, temperature 36.8 °C (98.2 °F), temperature source Temporal, resp. rate (!) 30, height 1.854 m (6' 0.99\"), weight 92 kg (202 lb 13.2 oz), SpO2 (!) 80 %.  Intake/Output last 3 Shifts:  I/O last 3 completed shifts:  In: 2423.4 (26.3 mL/kg) [P.O.:925; I.V.:1003.1 (10.9 mL/kg); IV Piggyback:495.3]  Out: 3194 (34.7 mL/kg) [Urine:2715 (0.8 mL/kg/hr); Chest Tube:479]  Weight: 92 kg     Relevant Results              Current Facility-Administered Medications:     acetaminophen (Tylenol) tablet 650 mg, 650 mg, oral, q4h PRN **OR** acetaminophen (Tylenol) oral liquid 650 mg, 650 mg, nasogastric tube, q4h PRN **OR** acetaminophen (Tylenol) suppository 650 mg, 650 mg, rectal, q4h PRN, Neetu Callaway, APRN-CNP    " acetylcysteine (Mucomyst) 200 mg/mL (20 %) nebulizer solution 600 mg, 3 mL, nebulization, TID, Waldo Price DO, 600 mg at 12/09/23 0734    aspirin chewable tablet 81 mg, 81 mg, oral, Daily, Waldo Price DO, 81 mg at 12/09/23 1002    atorvastatin (Lipitor) tablet 80 mg, 80 mg, oral, Nightly, TAMEKA Abreu, 80 mg at 12/08/23 2013    bisacodyl (Dulcolax) EC tablet 10 mg, 10 mg, oral, Daily PRN, TAMEKA Abreu    clopidogrel (Plavix) tablet 75 mg, 75 mg, oral, Daily, TAMEKA Abreu    dextrose 50 % injection 25 g, 25 g, intravenous, q15 min PRN **OR** glucagon (Glucagen) injection 1 mg, 1 mg, intramuscular, q15 min PRN, TAMEKA Zelaya    fluticasone (Flonase) nasal spray 2 spray, 2 spray, Each Nostril, Daily, TAMEKA Abreu, 2 spray at 12/09/23 1045    gabapentin (Neurontin) capsule 300 mg, 300 mg, oral, q8h BRANDON, TAMEKA Abreu, 300 mg at 12/09/23 0514    heparin (porcine) injection 5,000 Units, 5,000 Units, subcutaneous, q8h, TAMEKA Abreu, 5,000 Units at 12/09/23 0402    HYDROmorphone (Dilaudid) injection 0.4 mg, 0.4 mg, intravenous, q2h PRN, Waldo Price DO    ipratropium-albuteroL (Duo-Neb) 0.5-2.5 mg/3 mL nebulizer solution 3 mL, 3 mL, nebulization, TID, TAMEKA Abreu, 3 mL at 12/09/23 0734    isosorbide mononitrate ER (Imdur) 24 hr tablet 30 mg, 30 mg, oral, Daily, Waldo Price DO, 30 mg at 12/09/23 1004    labetaloL (Normodyne,Trandate) injection 10 mg, 10 mg, intravenous, q6h PRN, TAMEKA Julian, 10 mg at 12/08/23 2335    levothyroxine (Synthroid, Levoxyl) tablet 88 mcg, 88 mcg, oral, Daily, TAMEKA Abreu, 88 mcg at 12/09/23 0514    lidocaine 4 % patch 1 patch, 1 patch, transdermal, Daily, Waldo Price DO, 1 patch at 12/09/23 1002    [Held by provider] lisinopril tablet 20 mg, 20 mg, oral, Daily, TAMEKA Julian    magnesium sulfate IV 2 g, 2 g,  intravenous, q6h PRN, TAMEKA Abreu, Last Rate: 25 mL/hr at 12/09/23 0555, 2 g at 12/09/23 0555    magnesium sulfate IV 4 g, 4 g, intravenous, q6h PRN, KENNEDY Abreu-CNP    metoprolol tartrate (Lopressor) tablet 50 mg, 50 mg, oral, BID, Waldo Price, DO, 50 mg at 12/09/23 1002    naloxone (Narcan) injection 0.2 mg, 0.2 mg, intravenous, q5 min PRN, TAMEKA Abreu    nicotine (Nicoderm CQ) 14 mg/24 hr patch 1 patch, 1 patch, transdermal, Daily, TAMEKA Abreu    ondansetron (Zofran) tablet 4 mg, 4 mg, oral, q8h PRN **OR** ondansetron (Zofran) injection 4 mg, 4 mg, intravenous, q8h PRN, TAMEKA Abreu    oxyCODONE (Roxicodone) immediate release tablet 10 mg, 10 mg, oral, q4h PRN, Waldo Price, DO, 10 mg at 12/09/23 1039    oxyCODONE (Roxicodone) immediate release tablet 5 mg, 5 mg, oral, q4h PRN, KENNEDY Abreu-CNP, 5 mg at 12/08/23 1019    oxygen (O2) therapy, , inhalation, Continuous PRN - O2/gases, KENNEDY Zelaya-CNP    oxygen (O2) therapy, , inhalation, Continuous PRN - O2/gases, KENNEDY Zelaya-CNP, 4 L/min at 12/09/23 0545    pantoprazole (ProtoNix) EC tablet 40 mg, 40 mg, oral, Daily before breakfast, 40 mg at 12/09/23 0514 **OR** pantoprazole (ProtoNix) injection 40 mg, 40 mg, intravenous, Daily before breakfast, KENNEDY Abreu-CNP, 40 mg at 12/07/23 1522    phenylephrine in NS (Todd-Synephrine) syringe  - Omnicell Override Pull, , , ,     polyethylene glycol (Glycolax, Miralax) packet 17 g, 17 g, oral, Daily, KENNEDY Abreu-CNP, 17 g at 12/09/23 1005    potassium chloride CR (Klor-Con M20) ER tablet 20 mEq, 20 mEq, oral, q6h PRN **OR** potassium chloride (Klor-Con) packet 20 mEq, 20 mEq, oral, q6h PRN, KENNEDY Abreu-CNP    potassium chloride CR (Klor-Con M20) ER tablet 40 mEq, 40 mEq, oral, q6h PRN **OR** potassium chloride (Klor-Con) packet 40 mEq, 40 mEq, oral, q6h PRN, Neetu Callaway,  APRN-CNP    potassium chloride 20 mEq in 100 mL IV premix, 20 mEq, intravenous, q6h PRN, Neetu Callaway APRN-CNP, Last Rate: 50 mL/hr at 12/09/23 0555, 20 mEq at 12/09/23 0555    potassium chloride 40 mEq in 100 mL IV premix, 40 mEq, intravenous, q6h PRN, Neetu Callaway APRN-CNP    sodium phosphate 30 mmol in sodium chloride 0.9% 250 mL IV, 30 mmol, intravenous, Once, Linnea Silva APRN-CNP, Last Rate: 31.3 mL/hr at 12/09/23 0959, 30 mmol at 12/09/23 0959     Results for orders placed or performed during the hospital encounter of 12/07/23 (from the past 24 hour(s))   POCT GLUCOSE   Result Value Ref Range    POCT Glucose 128 (H) 74 - 99 mg/dL   POCT GLUCOSE   Result Value Ref Range    POCT Glucose 119 (H) 74 - 99 mg/dL   POCT GLUCOSE   Result Value Ref Range    POCT Glucose 140 (H) 74 - 99 mg/dL   POCT GLUCOSE   Result Value Ref Range    POCT Glucose 138 (H) 74 - 99 mg/dL   POCT GLUCOSE   Result Value Ref Range    POCT Glucose 146 (H) 74 - 99 mg/dL   Magnesium   Result Value Ref Range    Magnesium 1.92 1.60 - 2.40 mg/dL   CBC   Result Value Ref Range    WBC 11.4 (H) 4.4 - 11.3 x10*3/uL    nRBC 0.0 0.0 - 0.0 /100 WBCs    RBC 3.78 (L) 4.50 - 5.90 x10*6/uL    Hemoglobin 11.5 (L) 13.5 - 17.5 g/dL    Hematocrit 34.6 (L) 41.0 - 52.0 %    MCV 92 80 - 100 fL    MCH 30.4 26.0 - 34.0 pg    MCHC 33.2 32.0 - 36.0 g/dL    RDW 13.9 11.5 - 14.5 %    Platelets 148 (L) 150 - 450 x10*3/uL   Renal Function Panel   Result Value Ref Range    Glucose 154 (H) 74 - 99 mg/dL    Sodium 132 (L) 136 - 145 mmol/L    Potassium 3.9 3.5 - 5.3 mmol/L    Chloride 103 98 - 107 mmol/L    Bicarbonate 24 21 - 32 mmol/L    Anion Gap 9 (L) 10 - 20 mmol/L    Urea Nitrogen 11 6 - 23 mg/dL    Creatinine 0.82 0.50 - 1.30 mg/dL    eGFR >90 >60 mL/min/1.73m*2    Calcium 8.8 8.6 - 10.3 mg/dL    Phosphorus 1.9 (L) 2.5 - 4.9 mg/dL    Albumin 3.7 3.4 - 5.0 g/dL   Coagulation Screen   Result Value Ref Range    Protime 14.3 (H) 9.8 - 12.8 seconds    INR 1.3  (H) 0.9 - 1.1    aPTT 32 27 - 38 seconds   ECG 12 Lead   Result Value Ref Range    Ventricular Rate 86 BPM    Atrial Rate 86 BPM    MN Interval 138 ms    QRS Duration 88 ms    QT Interval 354 ms    QTC Calculation(Bazett) 423 ms    P Axis 46 degrees    R Axis 2 degrees    T Axis 15 degrees    QRS Count 15 beats    Q Onset 226 ms    P Onset 157 ms    P Offset 207 ms    T Offset 403 ms    QTC Fredericia 399 ms     ECG 12 Lead    Result Date: 12/9/2023  Normal sinus rhythm Low voltage QRS Borderline ECG When compared with ECG of 08-DEC-2023 06:32, (unconfirmed) No significant change was found    XR chest 1 view    Result Date: 12/9/2023  Interpreted By:  Waldo Carmichael, STUDY: XR CHEST 1 VIEW;  12/9/2023 5:15 am   INDICATION: Signs/Symptoms:post op cabg.   COMPARISON: 12/08/2023.   ACCESSION NUMBER(S): [id number]   ORDERING CLINICIAN: BREANNE YAÑEZ   FINDINGS: CARDIOMEDIASTINAL SILHOUETTE: Right jugular line again seen with tip at the distal SVC level. Cardiomegaly, aortic calcifications and postoperative changes of the mediastinum are again seen.   LUNGS: Previously visualized multifocal infiltrates show some improvement from prior with residual irregular bilateral perihilar and left infrahilar retrocardiac infiltrate. Small pleural effusions not excluded. No appreciable pneumothorax.   ABDOMEN: No remarkable upper abdominal findings.   BONES: Bones are stable.       1.  Previously visualized multifocal infiltrates show some improvement from prior with residual irregular bilateral perihilar and left infrahilar retrocardiac infiltrates.       MACRO: None.   Signed by: Waldo Carmichael 12/9/2023 6:40 AM Dictation workstation:   [id number]    MR orbit wo IV contrast    Result Date: 12/9/2023  Interpreted By:  Shira Benson, STUDY: MR BRAIN WO IV CONTRAST; MR ORBIT WO IV CONTRAST;  12/8/2023 10:20 pm   INDICATION: Signs/Symptoms:diploplia, ataxia, r/o stroke; Signs/Symptoms:EOM Palsy.   COMPARISON: None.    ACCESSION NUMBER(S): [id number]; [id number]   ORDERING CLINICIAN: FERNANDA NG   TECHNIQUE: Multiplanar and multisequence MRI of the brain was performed without administration of intravenous contrast.   Additionally, multiplanar and multisequence MRI images of the orbits were obtained, without administration of intravenous contrast.   FINDINGS: MRI BRAIN:   A subtle 5 mm focus of diffusion restriction is present in the dorsal tegmentum of the david (series 7, image 14), to the right of midline, immediately anterior to the 4th ventricle, to the right of midline, near the expected location of the right medial longitudinal fasciculus in the nucleus of cranial nerve 6. There is slight associated FLAIR hyperintense signal with minimal local mass effect, without evidence of hemorrhagic transformation.   No additional areas of abnormal diffusion restriction are identified intracranially.   There is no evidence of hemorrhage. No mass effect or midline shift. No abnormal hemosiderin staining is present intracranially.   There is no ventricular dilatation. Basal cisterns are patent. No extra-axial fluid collections are present.   Within limits of motion degraded exam, several subtle foci of hyperintense FLAIR and T2 signal present in the periventricular and subcortical white matter bilateral cerebral hemispheres are nonspecific, but favored to represent changes of microvascular disease.   Visualized large arterial flow voids, including intracranial carotid and basilar artery are preserved.   Small amount of T2 hyperintense fluid signal is present in the inferior left mastoid air cells, otherwise mastoid air cells are unremarkable in appearance.   MRI ORBITS:   Exam is somewhat degraded by motion.   Within limitations of the study, bony orbits are intact. Periorbital soft tissues, and intraorbital structures are symmetric in appearance.   No STIR hyperintense edema is present in the coronal or extraconal fat. Oculomotor  muscles are unremarkable in appearance.   Optic nerves are symmetric in size without evidence of atrophy or STIR hyperintense edema bilaterally. Globes are symmetric in size and otherwise unremarkable.   Lacrimal glands are unremarkable in appearance.   There is near complete opacification of the hypoplastic left maxillary sinus with mucosal thickening and secretions present in the right maxillary sinus.       MRI BRAIN: 1. Subtle 5 mm focus of diffusion restriction consistent with acute to early subacute infarct is present in the dorsal tegmentum of the david, of the right of midline, immediately anterior to the 4th ventricle near the expected location of the right medial longitudinal fasciculus and right nucleus of cranial nerve 6. There is slight local mass effect without evidence of hemorrhagic transformation. 2. No additional areas of diffusion restriction or acute abnormalities are identified intracranially. 3. Within limitations of mildly motion degraded exam, several subtle foci of hyperintense FLAIR and T2 signal present in the periventricular and subcortical white matter of bilateral cerebral hemispheres are nonspecific, but favored to represent changes of microvascular disease.   MRI ORBITS: 1. No acute intraorbital pathology is identified. Optic nerves are symmetric in size without evidence of edema bilaterally, within limitations of somewhat motion degraded study.   MACRO: None   Signed by: Shira Benson 12/9/2023 12:36 AM Dictation workstation:   [id number]    MR brain wo IV contrast    Result Date: 12/9/2023  Interpreted By:  Shira Benson, STUDY: MR BRAIN WO IV CONTRAST; MR ORBIT WO IV CONTRAST;  12/8/2023 10:20 pm   INDICATION: Signs/Symptoms:diploplia, ataxia, r/o stroke; Signs/Symptoms:EOM Palsy.   COMPARISON: None.   ACCESSION NUMBER(S): [id number]; [id number]   ORDERING CLINICIAN: FERNANDA NG   TECHNIQUE: Multiplanar and multisequence MRI of the brain was performed  without administration of intravenous contrast.   Additionally, multiplanar and multisequence MRI images of the orbits were obtained, without administration of intravenous contrast.   FINDINGS: MRI BRAIN:   A subtle 5 mm focus of diffusion restriction is present in the dorsal tegmentum of the david (series 7, image 14), to the right of midline, immediately anterior to the 4th ventricle, to the right of midline, near the expected location of the right medial longitudinal fasciculus in the nucleus of cranial nerve 6. There is slight associated FLAIR hyperintense signal with minimal local mass effect, without evidence of hemorrhagic transformation.   No additional areas of abnormal diffusion restriction are identified intracranially.   There is no evidence of hemorrhage. No mass effect or midline shift. No abnormal hemosiderin staining is present intracranially.   There is no ventricular dilatation. Basal cisterns are patent. No extra-axial fluid collections are present.   Within limits of motion degraded exam, several subtle foci of hyperintense FLAIR and T2 signal present in the periventricular and subcortical white matter bilateral cerebral hemispheres are nonspecific, but favored to represent changes of microvascular disease.   Visualized large arterial flow voids, including intracranial carotid and basilar artery are preserved.   Small amount of T2 hyperintense fluid signal is present in the inferior left mastoid air cells, otherwise mastoid air cells are unremarkable in appearance.   MRI ORBITS:   Exam is somewhat degraded by motion.   Within limitations of the study, bony orbits are intact. Periorbital soft tissues, and intraorbital structures are symmetric in appearance.   No STIR hyperintense edema is present in the coronal or extraconal fat. Oculomotor muscles are unremarkable in appearance.   Optic nerves are symmetric in size without evidence of atrophy or STIR hyperintense edema bilaterally. Globes are  symmetric in size and otherwise unremarkable.   Lacrimal glands are unremarkable in appearance.   There is near complete opacification of the hypoplastic left maxillary sinus with mucosal thickening and secretions present in the right maxillary sinus.       MRI BRAIN: 1. Subtle 5 mm focus of diffusion restriction consistent with acute to early subacute infarct is present in the dorsal tegmentum of the david, of the right of midline, immediately anterior to the 4th ventricle near the expected location of the right medial longitudinal fasciculus and right nucleus of cranial nerve 6. There is slight local mass effect without evidence of hemorrhagic transformation. 2. No additional areas of diffusion restriction or acute abnormalities are identified intracranially. 3. Within limitations of mildly motion degraded exam, several subtle foci of hyperintense FLAIR and T2 signal present in the periventricular and subcortical white matter of bilateral cerebral hemispheres are nonspecific, but favored to represent changes of microvascular disease.   MRI ORBITS: 1. No acute intraorbital pathology is identified. Optic nerves are symmetric in size without evidence of edema bilaterally, within limitations of somewhat motion degraded study.   MACRO: None   Signed by: Shira Benson 12/9/2023 12:36 AM Dictation workstation:   [id number]    XR chest 1 view    Result Date: 12/8/2023  Interpreted By:  Coral Serrano, STUDY: XR CHEST 1 VIEW; ;   INDICATION: Signs/Symptoms:s/p ct removal.   COMPARISON: Chest radiograph 12/07/2023 and 12/08/2023. CT chest 12/06/2023.   ACCESSION NUMBER(S): [id number]   ORDERING CLINICIAN: YOLANDA SIN   FINDINGS: Median sternotomy. Right IJ central venous catheter with tip in the cavoatrial junction. Interval removal of right-sided chest tube. Hypoinflated lungs. Bibasilar and right mid lung atelectatic changes with linear opacities evident..   Cardiomediastinal silhouette is within normal  limits for size and configuration. No sizable pleural effusion. No pneumothorax.       Please see findings   MACRO: None.   Signed by: Coral Serrano 12/8/2023 3:08 PM Dictation workstation:   [id number]    ECG 12 Lead    Result Date: 12/8/2023  Normal sinus rhythm Inferior infarct , age undetermined Possible Anterior infarct , age undetermined Abnormal ECG When compared with ECG of 07-DEC-2023 13:42, Borderline criteria for Anterior infarct are now Present Inferior infarct is now Present    XR chest 1 view    Result Date: 12/8/2023  Interpreted By:  Dacia Wolf, STUDY: XR CHEST 1 VIEW;  12/8/2023 12:48 am   INDICATION: Signs/Symptoms:Hypoxia.   COMPARISON: Chest x-ray 12/07/2023   ACCESSION NUMBER(S): [id number]   ORDERING CLINICIAN: DIETER GOMEZ   FINDINGS: Interval removal of endotracheal and enteric tubes. Right IJ [place]-Hemrinia catheter terminates in the right pulmonary artery. Mediastinal drain and bilateral chest tubes are stable in position. Midline sternotomy wires and epicardial leads noted. Overlying leads are also present.   CARDIOMEDIASTINAL SILHOUETTE: Cardiac silhouette is enlarged but stable.   LUNGS: Bandlike opacities in the right lung may relate to atelectasis. There is retrocardiac airspace opacity with blunting of the costophrenic angle which may relate to small effusion and atelectasis. No significant interval change. No definite pneumothorax.   ABDOMEN: No remarkable upper abdominal findings.   BONES: No acute osseous abnormality.       Bibasilar opacities may relate to small effusion and atelectasis. Superimposed infection not excluded. Attention on continued follow-up is advised.   Support hardware as described above.   MACRO: None   Signed by: Dacia Wolf 12/8/2023 1:22 AM Dictation workstation:   [id number]    ECG 12 Lead    Result Date: 12/7/2023  Normal sinus rhythm Normal ECG When compared with ECG of 06-DEC-2023 12:56, (unconfirmed) No significant change was found Confirmed  by Mario Lennon ([id number]) on 12/7/2023 4:13:49 PM    XR chest 1 view    Result Date: 12/7/2023  Interpreted By:  Waldo Carmichael, STUDY: XR CHEST 1 VIEW;  12/7/2023 2:23 pm   INDICATION: Signs/Symptoms:Post op cardiac surgery.   COMPARISON: 12/07/2023 at 8:55 a.m..   ACCESSION NUMBER(S): [id number]   ORDERING CLINICIAN: BREANNE YAÑEZ   FINDINGS: CARDIOMEDIASTINAL SILHOUETTE: Endotracheal tube is now seen with tip projecting approximately 4.5 cm above the inna level. NG tube is now seen extending to the stomach level with tip not visualized. [place] catheter from right jugular approach remains in place with tubing tip again projecting at the right main pulmonary artery level. There have been interval postoperative changes of the mediastinum with new median sternotomy wires and scattered surgical clips. Borderline size of the cardiac silhouette and mild aortic prominence is again seen, partially exaggerated by lower inspiratory volume. There is also a new cephalad directed midline probable mediastinal drain.   LUNGS: Bilateral cephalad directed chest tubes are now seen. Inspiratory volume is low. Elevation of the right hemidiaphragm again seen. New irregular regions of atelectasis and/or small infiltrates are seen in the perihilar regions bilaterally and left base. Small effusions not excluded. No appreciable pneumothorax.   ABDOMEN: No remarkable upper abdominal findings.   BONES: Bones are stable.       1.  Interval postoperative changes of the mediastinum with new life-support lines and tubes as detailed. 2. Low inspiratory volume with new irregular regions of atelectasis and or infiltrates in the perihilar regions bilaterally and left base.       MACRO: None.   Signed by: Waldo Carmichael 12/7/2023 3:09 PM Dictation workstation:   [id number]                   Assessment/Plan   Principal Problem:    Coronary artery disease involving native coronary artery of native heart with unstable angina pectoris  (CMS/HCC)  Triple Vessel CAD s/p CABG x 3: On pump CABG x3 LIMA-LAD, RA-OM, SVG-PDA  -Dr. Cheryl Murphy updated   - pabloan and montes removed 12/8  -dari to be removed 12/9  -Keep cordis   -CT removed 12/8  -BB resumed 12/8 metoprolol 50mg BID  -Imdur 30mg resumed 12/8  -Continue ASA and statin  -Lasix 20mg IV x 1 dose 12/9; keep MG >2 and K>4  -Daily CBC, BMP, MG  -Continue pain regimen with PRN Dilaudid,oxycodone, IV tylenol and norflex   -Bowel regimen as ordered, advance diet as tolerated  -Obtain KUB   -GI ppx with protonix   - subcutaneous heparin initiated 12/8 after ct removal   -Daily CXR and EKG  -Increase activity as tolerated; PT/OT following   -Keep in ICU over weekend for close monitoring     Acute stoke post-operatively-yesterday around 1700 patient complaining of double vision. When testing his extraocular muscles he appeared to have a left 6th nerve palsy. Double vision resolves when left eye covered. He has 5 out of 5 motor strength in all extremities. Sensory is intact on his face and extremities. Decision made to cut pacing wires and obtained a stat MRI as the patient high risk for stroke. MRI did show subtle 5 mm focus of diffusion restriction consistent with acute to early subacute infarct is present in the dorsal tegmentum of the david, of the right of midline, immediately anterior to the 4th ventricle near the expected location of the right medial longitudinal fasciculus and right nucleus of cranial nerve 6. There is slight local mass effect without evidence of hemorrhagic transformation.   -Neuro consulted appreciate recommendations  -Okay for Plavix from cardiac sx standpoint    -Keep MAP 80-90    Acute Blood Loss Anemia Post-Op  -Baseline Hgb 14.0  POD#1: Hgb-12.6; Plts-163k  POD#2: Hgb-11.5; Plts-148k  -Daily CBC  -Transfuse for Hgb goal > 7 or massive blood loss with hemodynamic instability     Post-Op Respiratory Insufficiency/Hypoxia  -Hx of PPD smoking, no documented pulmonary insufficiency    -Patient was extubated on 12/7 to NC, pt did desat to the 80s was put on CPAP overnight and given 20 mg IV Lasixs for diuresis now on 4L NC   -Wean supplemental oxygen for spO2 goal > 92%  -ABG as needed  -TID duonebs  -Daily CXR  -Mucinex daily        I spent 60 minutes in the professional and overall care of this patient.      Danielle Terrell, APRN-CNP

## 2023-12-09 NOTE — PROGRESS NOTES
Physical Therapy    Physical Therapy Treatment    Patient Name: Liban Jaramillo  MRN: [id number]  Today's Date: 12/9/2023  Time Calculation  Start Time: 1024  Stop Time: 1037  Time Calculation (min): 13 min       Assessment/Plan   PT Assessment  PT Assessment Results: Decreased strength, Decreased endurance, Decreased mobility, Impaired judgement, Decreased safety awareness, Impaired vision, Pain  Rehab Prognosis: Good  End of Session Communication: Bedside nurse  Assessment Comment: Pt with very limited participation due to agitation and pain. Pt refusing ambulation or transfers this session. Pt will benefit from continued skilled PT services if appropriate to progress mobility and transfers.  End of Session Patient Position: Up in chair, Alarm on  PT Plan  Inpatient/Swing Bed or Outpatient: Inpatient  PT Plan  Treatment/Interventions: Transfer training, Gait training, Therapeutic exercise  PT Plan: Skilled PT  PT Frequency: 4 times per week  PT Discharge Recommendations: Low intensity level of continued care      General Visit Information:   PT  Visit  PT Received On: 12/09/23  General  Reason for Referral: Recent cardiothoracic surgery  Referred By: Cesia  Past Medical History Relevant to Rehab: COPD, dyslipidemia, OA, HTN, MI, anxiety, PTSD, hypothyroidism, hernia repair, CAD, DM, smoker, cardiac cath, alcohol use, cervical disc disease, former IV drug use.  Prior to Session Communication: Bedside nurse  Patient Position Received: Up in chair, Alarm on  General Comment: Pt in chair with spouse present in the room. Pt is very agitated and fidgety. Pt most times refusing to open his eyes due to double vision. Pt perseverating on coughing up phleghm.    Subjective   Precautions:  Precautions  Medical Precautions: Fall precautions, Oxygen therapy device and L/min  Post-Surgical Precautions: Move in the Tube  Vital Signs:  Vital Signs  SpO2:  (Fluctuating between 85-94% pt perseverating on coughing and at times noted to  hold breath or breathe erratically.)    Objective   Pain:  Pain Assessment  Pain Assessment: 0-10  Pain Score: 9  Pain Type: Surgical pain  Pain Location: Chest  Pain Orientation: Anterior  Pain Interventions:  (Nursing and spouse talking with pt to take non-narcotic meds.)  Cognition:  Cognition  Orientation Level: Oriented X4  Impulsive:  (Impulsive with erractic movements at times despite pain.)  Postural Control:     Extremity/Trunk Assessments:        Activity Tolerance:  Activity Tolerance  Endurance: Decreased tolerance for upright activites  Treatments:  Therapeutic Exercise  Therapeutic Exercise Performed:  (Pt completes AP and LAQ x 10 ea BLE but resistant focusing only on pain and coughing up phleghm.)         Outcome Measures:  [facility] Basic Mobility  Turning from your back to your side while in a flat bed without using bedrails: A lot  Moving from lying on your back to sitting on the side of a flat bed without using bedrails: A lot  Moving to and from bed to chair (including a wheelchair): A little  Standing up from a chair using your arms (e.g. wheelchair or bedside chair): A little  To walk in hospital room: A little  Climbing 3-5 steps with railing: Total  Basic Mobility - Total Score: 14    Education Documentation  No documentation found.  Education Comments  No comments found.        EDUCATION:  Outpatient Education  Individual(s) Educated: Patient, Spouse  Education Provided: Body Mechanics, POC, Home Exercise Program, Post-Op Precautions  Plan of Care Discussed and Agreed Upon: yes  Patient Response to Education: Patient/Caregiver Verbalized Understanding of Information  Education Comment: Attempted education for ther ex, transfers, gait and sternal precautions. Pt receptive to minimal ther ex and MITT precautions but highly agitated and unsure of retention.    Encounter Problems       Encounter Problems (Active)       Impaired mobility s/p CABG        Perform all bed mobility with indep.   (Progressing)       Start:  12/08/23    Expected End:  12/22/23            Perform all transfers with ww vs LRAD and mod. indep.  (Progressing)       Start:  12/08/23    Expected End:  12/22/23            Patient will ambulate >/= 100 ft. with ww vs LRAD and mod. indep.  (Progressing)       Start:  12/08/23    Expected End:  12/22/23            Patient will ascend/descend 12 steps with cane or crutch and supervision  (Progressing)       Start:  12/08/23    Expected End:  12/22/23

## 2023-12-10 ENCOUNTER — APPOINTMENT (OUTPATIENT)
Dept: CARDIOLOGY | Facility: HOSPITAL | Age: 58
End: 2023-12-10
Payer: COMMERCIAL

## 2023-12-10 ENCOUNTER — APPOINTMENT (OUTPATIENT)
Dept: RADIOLOGY | Facility: HOSPITAL | Age: 58
End: 2023-12-10
Payer: COMMERCIAL

## 2023-12-10 LAB
ALBUMIN SERPL BCP-MCNC: 3.4 G/DL (ref 3.4–5)
ANION GAP SERPL CALC-SCNC: 10 MMOL/L (ref 10–20)
APTT PPP: 30 SECONDS (ref 27–38)
ATRIAL RATE: 73 BPM
ATRIAL RATE: 76 BPM
ATRIAL RATE: 86 BPM
BASOPHILS # BLD AUTO: 0.02 X10*3/UL (ref 0–0.1)
BASOPHILS NFR BLD AUTO: 0.2 %
BUN SERPL-MCNC: 13 MG/DL (ref 6–23)
CALCIUM SERPL-MCNC: 8.5 MG/DL (ref 8.6–10.3)
CHLORIDE SERPL-SCNC: 104 MMOL/L (ref 98–107)
CO2 SERPL-SCNC: 24 MMOL/L (ref 21–32)
CREAT SERPL-MCNC: 0.83 MG/DL (ref 0.5–1.3)
EOSINOPHIL # BLD AUTO: 0.22 X10*3/UL (ref 0–0.7)
EOSINOPHIL NFR BLD AUTO: 2.1 %
ERYTHROCYTE [DISTWIDTH] IN BLOOD BY AUTOMATED COUNT: 13.7 % (ref 11.5–14.5)
GFR SERPL CREATININE-BSD FRML MDRD: >90 ML/MIN/1.73M*2
GLUCOSE BLD MANUAL STRIP-MCNC: 106 MG/DL (ref 74–99)
GLUCOSE SERPL-MCNC: 107 MG/DL (ref 74–99)
HCT VFR BLD AUTO: 34 % (ref 41–52)
HGB BLD-MCNC: 11.4 G/DL (ref 13.5–17.5)
IMM GRANULOCYTES # BLD AUTO: 0.02 X10*3/UL (ref 0–0.7)
IMM GRANULOCYTES NFR BLD AUTO: 0.2 % (ref 0–0.9)
INR PPP: 1.3 (ref 0.9–1.1)
LYMPHOCYTES # BLD AUTO: 1.76 X10*3/UL (ref 1.2–4.8)
LYMPHOCYTES NFR BLD AUTO: 16.7 %
MAGNESIUM SERPL-MCNC: 1.67 MG/DL (ref 1.6–2.4)
MCH RBC QN AUTO: 30.7 PG (ref 26–34)
MCHC RBC AUTO-ENTMCNC: 33.5 G/DL (ref 32–36)
MCV RBC AUTO: 92 FL (ref 80–100)
MONOCYTES # BLD AUTO: 1.24 X10*3/UL (ref 0.1–1)
MONOCYTES NFR BLD AUTO: 11.7 %
NEUTROPHILS # BLD AUTO: 7.31 X10*3/UL (ref 1.2–7.7)
NEUTROPHILS NFR BLD AUTO: 69.1 %
NRBC BLD-RTO: 0 /100 WBCS (ref 0–0)
P AXIS: 42 DEGREES
P AXIS: 46 DEGREES
P AXIS: 47 DEGREES
P OFFSET: 206 MS
P OFFSET: 207 MS
P OFFSET: 207 MS
P ONSET: 153 MS
P ONSET: 157 MS
P ONSET: 157 MS
PHOSPHATE SERPL-MCNC: 2.5 MG/DL (ref 2.5–4.9)
PLATELET # BLD AUTO: 145 X10*3/UL (ref 150–450)
POTASSIUM SERPL-SCNC: 3.8 MMOL/L (ref 3.5–5.3)
PR INTERVAL: 134 MS
PR INTERVAL: 138 MS
PR INTERVAL: 146 MS
PROTHROMBIN TIME: 14.9 SECONDS (ref 9.8–12.8)
Q ONSET: 224 MS
Q ONSET: 226 MS
Q ONSET: 226 MS
QRS COUNT: 12 BEATS
QRS COUNT: 13 BEATS
QRS COUNT: 15 BEATS
QRS DURATION: 86 MS
QRS DURATION: 86 MS
QRS DURATION: 88 MS
QT INTERVAL: 354 MS
QT INTERVAL: 374 MS
QT INTERVAL: 386 MS
QTC CALCULATION(BAZETT): 420 MS
QTC CALCULATION(BAZETT): 423 MS
QTC CALCULATION(BAZETT): 425 MS
QTC FREDERICIA: 399 MS
QTC FREDERICIA: 404 MS
QTC FREDERICIA: 412 MS
R AXIS: -5 DEGREES
R AXIS: 10 DEGREES
R AXIS: 2 DEGREES
RBC # BLD AUTO: 3.71 X10*6/UL (ref 4.5–5.9)
SODIUM SERPL-SCNC: 134 MMOL/L (ref 136–145)
T AXIS: 11 DEGREES
T AXIS: 15 DEGREES
T AXIS: 52 DEGREES
T OFFSET: 403 MS
T OFFSET: 411 MS
T OFFSET: 419 MS
VENTRICULAR RATE: 73 BPM
VENTRICULAR RATE: 76 BPM
VENTRICULAR RATE: 86 BPM
WBC # BLD AUTO: 10.6 X10*3/UL (ref 4.4–11.3)

## 2023-12-10 PROCEDURE — 2500000005 HC RX 250 GENERAL PHARMACY W/O HCPCS: Performed by: HOSPITALIST

## 2023-12-10 PROCEDURE — 74018 RADEX ABDOMEN 1 VIEW: CPT

## 2023-12-10 PROCEDURE — 37799 UNLISTED PX VASCULAR SURGERY: CPT

## 2023-12-10 PROCEDURE — 2500000005 HC RX 250 GENERAL PHARMACY W/O HCPCS: Performed by: EMERGENCY MEDICINE

## 2023-12-10 PROCEDURE — 93005 ELECTROCARDIOGRAM TRACING: CPT

## 2023-12-10 PROCEDURE — 2500000004 HC RX 250 GENERAL PHARMACY W/ HCPCS (ALT 636 FOR OP/ED): Performed by: HOSPITALIST

## 2023-12-10 PROCEDURE — 83735 ASSAY OF MAGNESIUM: CPT

## 2023-12-10 PROCEDURE — 85025 COMPLETE CBC W/AUTO DIFF WBC: CPT | Performed by: HOSPITALIST

## 2023-12-10 PROCEDURE — 93010 ELECTROCARDIOGRAM REPORT: CPT | Performed by: INTERNAL MEDICINE

## 2023-12-10 PROCEDURE — 2500000004 HC RX 250 GENERAL PHARMACY W/ HCPCS (ALT 636 FOR OP/ED)

## 2023-12-10 PROCEDURE — 2020000001 HC ICU ROOM DAILY

## 2023-12-10 PROCEDURE — 2500000002 HC RX 250 W HCPCS SELF ADMINISTERED DRUGS (ALT 637 FOR MEDICARE OP, ALT 636 FOR OP/ED)

## 2023-12-10 PROCEDURE — C9113 INJ PANTOPRAZOLE SODIUM, VIA: HCPCS

## 2023-12-10 PROCEDURE — 85730 THROMBOPLASTIN TIME PARTIAL: CPT

## 2023-12-10 PROCEDURE — 2500000001 HC RX 250 WO HCPCS SELF ADMINISTERED DRUGS (ALT 637 FOR MEDICARE OP)

## 2023-12-10 PROCEDURE — 2500000004 HC RX 250 GENERAL PHARMACY W/ HCPCS (ALT 636 FOR OP/ED): Performed by: NURSE PRACTITIONER

## 2023-12-10 PROCEDURE — 82947 ASSAY GLUCOSE BLOOD QUANT: CPT

## 2023-12-10 PROCEDURE — 94640 AIRWAY INHALATION TREATMENT: CPT

## 2023-12-10 PROCEDURE — 96372 THER/PROPH/DIAG INJ SC/IM: CPT

## 2023-12-10 PROCEDURE — S4991 NICOTINE PATCH NONLEGEND: HCPCS

## 2023-12-10 PROCEDURE — 2500000004 HC RX 250 GENERAL PHARMACY W/ HCPCS (ALT 636 FOR OP/ED): Performed by: EMERGENCY MEDICINE

## 2023-12-10 PROCEDURE — 2500000001 HC RX 250 WO HCPCS SELF ADMINISTERED DRUGS (ALT 637 FOR MEDICARE OP): Performed by: EMERGENCY MEDICINE

## 2023-12-10 PROCEDURE — 74018 RADEX ABDOMEN 1 VIEW: CPT | Performed by: RADIOLOGY

## 2023-12-10 PROCEDURE — 80069 RENAL FUNCTION PANEL: CPT

## 2023-12-10 PROCEDURE — 71045 X-RAY EXAM CHEST 1 VIEW: CPT

## 2023-12-10 PROCEDURE — 99291 CRITICAL CARE FIRST HOUR: CPT

## 2023-12-10 PROCEDURE — 99233 SBSQ HOSP IP/OBS HIGH 50: CPT | Performed by: NURSE PRACTITIONER

## 2023-12-10 PROCEDURE — 71045 X-RAY EXAM CHEST 1 VIEW: CPT | Performed by: RADIOLOGY

## 2023-12-10 RX ORDER — TRAMADOL HYDROCHLORIDE 50 MG/1
50 TABLET ORAL EVERY 6 HOURS PRN
Status: DISCONTINUED | OUTPATIENT
Start: 2023-12-10 | End: 2023-12-11 | Stop reason: HOSPADM

## 2023-12-10 RX ORDER — FUROSEMIDE 10 MG/ML
20 INJECTION INTRAMUSCULAR; INTRAVENOUS ONCE
Status: COMPLETED | OUTPATIENT
Start: 2023-12-10 | End: 2023-12-10

## 2023-12-10 RX ORDER — DOCUSATE SODIUM 100 MG/1
100 CAPSULE, LIQUID FILLED ORAL 2 TIMES DAILY
Status: DISCONTINUED | OUTPATIENT
Start: 2023-12-10 | End: 2023-12-11

## 2023-12-10 RX ADMIN — ACETAMINOPHEN 1000 MG: 10 INJECTION, SOLUTION INTRAVENOUS at 05:03

## 2023-12-10 RX ADMIN — BISACODYL 10 MG: 5 TABLET ORAL at 10:36

## 2023-12-10 RX ADMIN — DOCUSATE SODIUM 100 MG: 100 CAPSULE, LIQUID FILLED ORAL at 21:11

## 2023-12-10 RX ADMIN — METOCLOPRAMIDE HYDROCHLORIDE 10 MG: 5 INJECTION INTRAMUSCULAR; INTRAVENOUS at 21:10

## 2023-12-10 RX ADMIN — MAGNESIUM SULFATE HEPTAHYDRATE 4 G: 40 INJECTION, SOLUTION INTRAVENOUS at 05:04

## 2023-12-10 RX ADMIN — ASPIRIN 81 MG: 81 TABLET, CHEWABLE ORAL at 10:30

## 2023-12-10 RX ADMIN — CLOPIDOGREL BISULFATE 75 MG: 75 TABLET, FILM COATED ORAL at 10:29

## 2023-12-10 RX ADMIN — ACETYLCYSTEINE 600 MG: 200 SOLUTION ORAL; RESPIRATORY (INHALATION) at 20:31

## 2023-12-10 RX ADMIN — ACETYLCYSTEINE 600 MG: 200 SOLUTION ORAL; RESPIRATORY (INHALATION) at 13:45

## 2023-12-10 RX ADMIN — PANTOPRAZOLE SODIUM 40 MG: 40 INJECTION, POWDER, FOR SOLUTION INTRAVENOUS at 05:04

## 2023-12-10 RX ADMIN — METOPROLOL TARTRATE 50 MG: 50 TABLET, FILM COATED ORAL at 21:11

## 2023-12-10 RX ADMIN — ISOSORBIDE MONONITRATE 30 MG: 30 TABLET, EXTENDED RELEASE ORAL at 10:30

## 2023-12-10 RX ADMIN — HEPARIN SODIUM 5000 UNITS: 5000 INJECTION INTRAVENOUS; SUBCUTANEOUS at 14:31

## 2023-12-10 RX ADMIN — METOPROLOL TARTRATE 50 MG: 50 TABLET, FILM COATED ORAL at 10:28

## 2023-12-10 RX ADMIN — LISINOPRIL 20 MG: 20 TABLET ORAL at 10:27

## 2023-12-10 RX ADMIN — FUROSEMIDE 20 MG: 10 INJECTION, SOLUTION INTRAMUSCULAR; INTRAVENOUS at 10:46

## 2023-12-10 RX ADMIN — LEVOTHYROXINE SODIUM 44 MCG: 20 INJECTION, SOLUTION INTRAVENOUS at 05:04

## 2023-12-10 RX ADMIN — POTASSIUM CHLORIDE 20 MEQ: 200 INJECTION, SOLUTION INTRAVENOUS at 05:04

## 2023-12-10 RX ADMIN — METOCLOPRAMIDE HYDROCHLORIDE 10 MG: 5 INJECTION INTRAMUSCULAR; INTRAVENOUS at 10:23

## 2023-12-10 RX ADMIN — GABAPENTIN 300 MG: 300 CAPSULE ORAL at 17:44

## 2023-12-10 RX ADMIN — NICOTINE 1 PATCH: 14 PATCH, EXTENDED RELEASE TRANSDERMAL at 10:22

## 2023-12-10 RX ADMIN — HEPARIN SODIUM 5000 UNITS: 5000 INJECTION INTRAVENOUS; SUBCUTANEOUS at 03:48

## 2023-12-10 RX ADMIN — LIDOCAINE 1 PATCH: 4 PATCH TOPICAL at 10:22

## 2023-12-10 RX ADMIN — FLUTICASONE PROPIONATE 2 SPRAY: 50 SPRAY, METERED NASAL at 10:28

## 2023-12-10 RX ADMIN — METOPROLOL TARTRATE 5 MG: 5 INJECTION INTRAVENOUS at 05:25

## 2023-12-10 RX ADMIN — ATORVASTATIN CALCIUM 80 MG: 80 TABLET, FILM COATED ORAL at 21:11

## 2023-12-10 RX ADMIN — SODIUM CHLORIDE, POTASSIUM CHLORIDE, SODIUM LACTATE AND CALCIUM CHLORIDE 75 ML/HR: 600; 310; 30; 20 INJECTION, SOLUTION INTRAVENOUS at 05:26

## 2023-12-10 RX ADMIN — DOCUSATE SODIUM 100 MG: 100 CAPSULE, LIQUID FILLED ORAL at 10:46

## 2023-12-10 RX ADMIN — GUAIFENESIN 600 MG: 600 TABLET, EXTENDED RELEASE ORAL at 10:36

## 2023-12-10 RX ADMIN — IPRATROPIUM BROMIDE AND ALBUTEROL SULFATE 3 ML: 2.5; .5 SOLUTION RESPIRATORY (INHALATION) at 07:33

## 2023-12-10 RX ADMIN — IPRATROPIUM BROMIDE AND ALBUTEROL SULFATE 3 ML: 2.5; .5 SOLUTION RESPIRATORY (INHALATION) at 13:45

## 2023-12-10 RX ADMIN — IPRATROPIUM BROMIDE AND ALBUTEROL SULFATE 3 ML: 2.5; .5 SOLUTION RESPIRATORY (INHALATION) at 20:31

## 2023-12-10 RX ADMIN — GABAPENTIN 300 MG: 300 CAPSULE ORAL at 10:31

## 2023-12-10 RX ADMIN — HEPARIN SODIUM 5000 UNITS: 5000 INJECTION INTRAVENOUS; SUBCUTANEOUS at 21:10

## 2023-12-10 RX ADMIN — POLYETHYLENE GLYCOL 3350 17 G: 17 POWDER, FOR SOLUTION ORAL at 10:26

## 2023-12-10 RX ADMIN — ACETYLCYSTEINE 600 MG: 200 SOLUTION ORAL; RESPIRATORY (INHALATION) at 07:33

## 2023-12-10 RX ADMIN — METOCLOPRAMIDE HYDROCHLORIDE 10 MG: 5 INJECTION INTRAMUSCULAR; INTRAVENOUS at 14:31

## 2023-12-10 RX ADMIN — METOPROLOL TARTRATE 5 MG: 5 INJECTION INTRAVENOUS at 00:54

## 2023-12-10 ASSESSMENT — PAIN DESCRIPTION - DESCRIPTORS: DESCRIPTORS: ACHING

## 2023-12-10 ASSESSMENT — PAIN SCALES - GENERAL
PAINLEVEL_OUTOF10: 4
PAINLEVEL_OUTOF10: 1
PAINLEVEL_OUTOF10: 0 - NO PAIN

## 2023-12-10 ASSESSMENT — COGNITIVE AND FUNCTIONAL STATUS - GENERAL
MOBILITY SCORE: 16
WALKING IN HOSPITAL ROOM: A LITTLE
MOVING TO AND FROM BED TO CHAIR: A LITTLE
STANDING UP FROM CHAIR USING ARMS: A LITTLE
CLIMB 3 TO 5 STEPS WITH RAILING: TOTAL
TURNING FROM BACK TO SIDE WHILE IN FLAT BAD: A LITTLE
MOVING FROM LYING ON BACK TO SITTING ON SIDE OF FLAT BED WITH BEDRAILS: A LITTLE

## 2023-12-10 ASSESSMENT — PAIN - FUNCTIONAL ASSESSMENT: PAIN_FUNCTIONAL_ASSESSMENT: 0-10

## 2023-12-10 NOTE — CARE PLAN
The patient's goals for the shift include      The clinical goals for the shift include Pt will state managable pain control. Pt will have vital signs WNL    Over the shift, the patient did not make progress toward the following goals. Will continue to encourage pt to increase mobility. Work with the IS. Educate the patient and family the importance of wearing the eye patch throughout the day and switching eyes to help with double vision

## 2023-12-10 NOTE — PROGRESS NOTES
[facility] Critical Care Medicine       Date:  12/10/2023  Patient:  Liban Jaramillo  YOB: 1965  MRN:  [id number]   Admit Date:  12/7/2023  ========================================================================================================    S/P On Pump CABG x 3         History of Present Illness:  Liban Jaramillo is a 58 y.o. year old male patient with Past Medical History of hypertension, hyperlipidemia, hypothyroidism, tobacco use and CAD.  He was referred by cardiology for coronary bypass grafting. He initially presented to ED on 10/21/23 endorsing intermittent mid sternal chest pain unrelieved by nitroglycerin. He underwent cardiac catheterization that showed 90% stenosis in LAD, 95% stenosis in left circumflex as well as 50% stenosis in left main. Right coronary artery was without disease and LVEF was normal at 60%.  On 12/7, patient underwent on pump x 3 CABG, LIMA-LAD, RA-OM, SVG-PDA.           Interval ICU Events:  On 12/7, patient underwent on pump x 3 CABG, LIMA-LAD, RA-OM, SVG-PDA Cross clamp time was 57 min, on pump for 69 min. He returned to ICU in stable postoperative condition with Ventricular wires, 1 mediastinal chest tube, 1 R pleural chest tube and 1 left pleural chest tube, intubated and sedation on propofol. On arrival to ICU, pt was given 200 mcg of push dose epi for hypotension with great response.     12/8: POD #1CABG x 3. Patient is afebrile and hemodynamically stable (nicardipine was discontinued early this am) HR is NSR with rates in the 80s with no ectopy. He is 93% SP02 on HFNC. Pt was endorsing productive cough that is difficult to get up, given mucomyst this am. He states that pain is well controlled however he is insistent that Montes and chest tubes come out. He was told after ambulating today - okay to remove chest tubes and montes.     12/9: POD # 2. Pt began having L eye double vision last night with strabismus and CN3 palsy. 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 Neuro was consulted and they are recommending plavix at this time. Other wise patient remained hemodynamically stable, afebrile, and NSR with no ectopy. He was on 4L NC with SPO2 of 95%- however is refusing to wear Nasal Canula at this time and otherwise wanted to leave AMA, I discussed with him at length of importance of staying in the hospital post op and he reluctantly agreed.     12/10: POD #3. Patient extremely agitated overnight-security called. Patient was unhappy with his NPO status and being helped to the bathroom. He was de escalated- allowed for sips and chips. He remained afebrile and hemodynamically stable, HR NSR with no ectopy. States he is passing gas but has not had a bowel movement. States pain has improved.      Medical History:  Past Medical History:   Diagnosis Date    Anxiety     Arthritis     Cervical disc disease     COPD (chronic obstructive pulmonary disease) (CMS/Formerly [facility])     Coronary artery disease     Diabetes mellitus (CMS/Formerly [facility])     \"diet controlled\" and is not testing his glucose    Disease of thyroid gland     GERD (gastroesophageal reflux disease)     Hyperlipidemia     Hypertension     Myocardial infarction (CMS/HCC)     Nephrolithiasis     PTSD (post-traumatic stress disorder)     Skin disorder     states rash on occassion    Wears glasses     [place] teeth extracted      Past Surgical History:   Procedure Laterality Date    CARDIAC CATHETERIZATION      HERNIA REPAIR      TONSILLECTOMY      WISDOM TOOTH EXTRACTION       Medications Prior to Admission   Medication Sig Dispense Refill Last Dose    aspirin 81 mg EC tablet Take 1 tablet (81 mg) by mouth once daily in the morning. Take before meals. 90 tablet 1 Past Week    atorvastatin (Lipitor) 80 mg tablet Take 1 tablet (80 mg) by mouth once daily. 90 tablet 1 Past Week    isosorbide mononitrate ER (Imdur) 30 mg 24 hr tablet Take 1 tablet (30 mg) " by mouth once daily. 90 tablet 1 Past Week    levothyroxine (Synthroid, Levoxyl) 88 mcg tablet Take 1 tablet (88 mcg) by mouth once daily. 60 tablet 0 Past Week    lisinopril 20 mg tablet Take 1 tablet (20 mg) by mouth once daily. 90 tablet 1 Past Week    metoprolol tartrate (Lopressor) 50 mg tablet Take 1 tablet by mouth 2 times a day. (Patient taking differently: Take 1 tablet by mouth once daily. 12/4/23 patient states he takes 50 mg daily in the morning) 180 tablet 1 Past Week    mupirocin (Bactroban) 2 % ointment As directed   12/7/2023 at 0600    nitroglycerin (Nitrostat) 0.4 mg SL tablet Place 1 tablet (0.4 mg) under the tongue every 5 minutes if needed for chest pain. 90 tablet 1 Past Week    ranolazine (Ranexa) 500 mg 12 hr tablet Take 1 tablet (500 mg) by mouth 2 times a day. 180 tablet 1 Past Week    Advair HFA 45-21 mcg/actuation inhaler Inhale 2 puffs 2 times a day as needed. States he uses it PRN   More than a month    blood sugar diagnostic strip 1 each once daily. 100 strip 3 Unknown    FreeStyle glucose monitoring kit 1 each 3 times a day as needed (for low blood sugar symptoms). 1 kit 0 Unknown    lancets misc 1 each once daily. 100 each 3 Unknown at f     Penicillins  Social History     Tobacco Use    Smoking status: Every Day     Packs/day: 0.50     Years: 46.00     Additional pack years: 0.00     Total pack years: 23.00     Types: Cigarettes    Smokeless tobacco: Never   Vaping Use    Vaping Use: Never used   Substance Use Topics    Alcohol use: Yes     Alcohol/week: 3.0 standard drinks of alcohol     Types: 3 Cans of beer per week    Drug use: Not Currently     Comment: before everything     Family History   Problem Relation Name Age of Onset    Diabetes Mother      Cancer Mother      Diabetes Father      Liver disease Father      Esophageal cancer Brother      Throat cancer Maternal Grandfather         Hospital Medications:        Current Facility-Administered Medications:     acetaminophen  (Tylenol) tablet 650 mg, 650 mg, oral, q4h PRN **OR** [DISCONTINUED] acetaminophen (Tylenol) oral liquid 650 mg, 650 mg, nasogastric tube, q4h PRN **OR** [DISCONTINUED] acetaminophen (Tylenol) suppository 650 mg, 650 mg, rectal, q4h PRN, Neetu Callaway APRN-CNP    acetylcysteine (Mucomyst) 200 mg/mL (20 %) nebulizer solution 600 mg, 3 mL, nebulization, TID, Waldo Price, DO, 600 mg at 12/10/23 0733    aspirin chewable tablet 81 mg, 81 mg, oral, Daily, Waldo Price DO, 81 mg at 12/10/23 1030    atorvastatin (Lipitor) tablet 80 mg, 80 mg, oral, Nightly, Neetu Callaway APRN-CNP, 80 mg at 12/08/23 2013    bisacodyl (Dulcolax) EC tablet 10 mg, 10 mg, oral, Daily PRN, Neetu Callaway APRN-CNP, 10 mg at 12/10/23 1036    clopidogrel (Plavix) tablet 75 mg, 75 mg, oral, Daily, Neetu Callaway APRN-CNP, 75 mg at 12/10/23 1029    dextrose 50 % injection 25 g, 25 g, intravenous, q15 min PRN **OR** glucagon (Glucagen) injection 1 mg, 1 mg, intramuscular, q15 min PRN, Paras Lewis APRN-CNP    docusate sodium (Colace) capsule 100 mg, 100 mg, oral, BID, Neetu Callaway APRN-CNP, 100 mg at 12/10/23 1046    fluticasone (Flonase) nasal spray 2 spray, 2 spray, Each Nostril, Daily, Neetu Callaway APRN-CNP, 2 spray at 12/10/23 1028    gabapentin (Neurontin) capsule 300 mg, 300 mg, oral, q8h BRANDON, Neetu Callaway APRN-CNP, 300 mg at 12/10/23 1031    guaiFENesin (Mucinex) 12 hr tablet 600 mg, 600 mg, oral, BID PRN, Danielle Terrell APRN-CNP, 600 mg at 12/10/23 1036    heparin (porcine) injection 5,000 Units, 5,000 Units, subcutaneous, q8h, Neetu Callaway APRN-CNP, 5,000 Units at 12/10/23 0348    HYDROmorphone (Dilaudid) injection 0.4 mg, 0.4 mg, intravenous, q2h PRN, Waldo Price DO    ipratropium-albuteroL (Duo-Neb) 0.5-2.5 mg/3 mL nebulizer solution 3 mL, 3 mL, nebulization, TID, Neetu Callaway APRN-CNP, 3 mL at 12/10/23 0733    isosorbide mononitrate ER (Imdur) 24 hr tablet 30 mg, 30 mg,  oral, Daily, Waldo Price DO, 30 mg at 12/10/23 1030    labetaloL (Normodyne,Trandate) injection 10 mg, 10 mg, intravenous, q6h PRN, KENNDEY Julian-CNP, 10 mg at 12/08/23 2335    lactated Ringer's infusion, 75 mL/hr, intravenous, Continuous, TAMEKA Abreu, Last Rate: 75 mL/hr at 12/10/23 0526, 75 mL/hr at 12/10/23 0526    levothyroxine (Synthroid, Levoxyl) tablet 88 mcg, 88 mcg, oral, Daily, KENNEDY Abreu-CNP, 88 mcg at 12/09/23 0514    lidocaine 4 % patch 1 patch, 1 patch, transdermal, Daily, Waldo Price DO, 1 patch at 12/10/23 1022    lisinopril tablet 20 mg, 20 mg, oral, Daily, KENNEDY Julian-CNP, 20 mg at 12/10/23 1027    magnesium sulfate IV 2 g, 2 g, intravenous, q6h PRN, KENNEDY Abreu-CNP, Stopped at 12/09/23 0755    magnesium sulfate IV 4 g, 4 g, intravenous, q6h PRN, KENNEDY Abreu-CNP, Stopped at 12/10/23 0904    metoclopramide (Reglan) injection 10 mg, 10 mg, intravenous, TID, KENNEDY Abreu-CNP, 10 mg at 12/10/23 1023    metoprolol tartrate (Lopressor) tablet 50 mg, 50 mg, oral, BID, Waldo Price DO, 50 mg at 12/10/23 1028    nicotine (Nicoderm CQ) 14 mg/24 hr patch 1 patch, 1 patch, transdermal, Daily, TAMEKA Abreu, 1 patch at 12/10/23 1022    ondansetron (Zofran) tablet 4 mg, 4 mg, oral, q8h PRN **OR** ondansetron (Zofran) injection 4 mg, 4 mg, intravenous, q8h PRN, KENNEDY Abreu-CNP    oxygen (O2) therapy, , inhalation, Continuous PRN - O2/gases, Paras Lewis, APRN-CNP    pantoprazole (ProtoNix) EC tablet 40 mg, 40 mg, oral, Daily before breakfast, 40 mg at 12/09/23 0514 **OR** pantoprazole (ProtoNix) injection 40 mg, 40 mg, intravenous, Daily before breakfast, TAMEKA Abreu, 40 mg at 12/10/23 0504    polyethylene glycol (Glycolax, Miralax) packet 17 g, 17 g, oral, Daily, TAMEKA Abreu, 17 g at 12/10/23 1026    potassium chloride CR (Klor-Con M20) ER tablet 20 mEq,  20 mEq, oral, q6h PRN **OR** potassium chloride (Klor-Con) packet 20 mEq, 20 mEq, oral, q6h PRN, Neetu R Cesia, APRN-CNP    potassium chloride CR (Klor-Con M20) ER tablet 40 mEq, 40 mEq, oral, q6h PRN **OR** potassium chloride (Klor-Con) packet 40 mEq, 40 mEq, oral, q6h PRN, Neetu R Cesia, APRN-CNP    potassium chloride 20 mEq in 100 mL IV premix, 20 mEq, intravenous, q6h PRN, Neetu R Callaway, APRN-CNP, Stopped at 12/10/23 0704    potassium chloride 40 mEq in 100 mL IV premix, 40 mEq, intravenous, q6h PRN, Neetu R Callaway, APRN-CNP    traMADol (Ultram) tablet 50 mg, 50 mg, oral, q6h PRN, Neetu R Callaway, APRN-CNP    Review of Systems:  14 point review of systems was completed and negative except for those specially mention in my HPI    Physical Exam:    Heart Rate:  []   Temp:  [36.5 °C (97.7 °F)-36.9 °C (98.4 °F)]   Resp:  [19-39]   BP: (109-154)/(65-81)   Weight:  [100 kg (221 lb 1.9 oz)]   SpO2:  [86 %-98 %]     Physical Exam  Vitals and nursing note reviewed.   Constitutional:       Appearance: Normal appearance. He is well-developed.      Interventions: Nasal cannula in place.   HENT:      Head: Normocephalic and atraumatic.   Eyes:      General: Visual field deficit present.      Extraocular Movements:      Left eye: Abnormal extraocular motion present.      Comments: L eye palsy    Cardiovascular:      Rate and Rhythm: Normal rate and regular rhythm.      Pulses: Normal pulses.      Heart sounds: Normal heart sounds.   Pulmonary:      Effort: Pulmonary effort is normal.      Breath sounds: Examination of the right-lower field reveals rhonchi. Examination of the left-lower field reveals rhonchi. Rhonchi present.   Chest:      Comments: Midline incision with no erythema or drainage   Abdominal:      General: Abdomen is flat. Bowel sounds are decreased.      Palpations: Abdomen is soft.   Musculoskeletal:      Cervical back: Full passive range of motion without pain and normal range of motion.       Right lower leg: No edema.      Left lower leg: No edema.   Skin:     General: Skin is cool.      Capillary Refill: Capillary refill takes less than 2 seconds.      Comments: Radial and Saphenous harvesting site CDI, no erythema or drainage noted    Neurological:      General: No focal deficit present.      Mental Status: He is alert and oriented to person, place, and time.      Motor: Motor function is intact.      Coordination: Coordination is intact.      Gait: Gait is intact.   Psychiatric:         Attention and Perception: Perception normal.         Speech: Speech normal.         Behavior: Behavior is agitated.         Objective:    Results for orders placed or performed during the hospital encounter of 12/07/23 (from the past 24 hour(s))   POCT GLUCOSE   Result Value Ref Range    POCT Glucose 140 (H) 74 - 99 mg/dL   Magnesium   Result Value Ref Range    Magnesium 1.67 1.60 - 2.40 mg/dL   Renal Function Panel   Result Value Ref Range    Glucose 107 (H) 74 - 99 mg/dL    Sodium 134 (L) 136 - 145 mmol/L    Potassium 3.8 3.5 - 5.3 mmol/L    Chloride 104 98 - 107 mmol/L    Bicarbonate 24 21 - 32 mmol/L    Anion Gap 10 10 - 20 mmol/L    Urea Nitrogen 13 6 - 23 mg/dL    Creatinine 0.83 0.50 - 1.30 mg/dL    eGFR >90 >60 mL/min/1.73m*2    Calcium 8.5 (L) 8.6 - 10.3 mg/dL    Phosphorus 2.5 2.5 - 4.9 mg/dL    Albumin 3.4 3.4 - 5.0 g/dL   Coagulation Screen   Result Value Ref Range    Protime 14.9 (H) 9.8 - 12.8 seconds    INR 1.3 (H) 0.9 - 1.1    aPTT 30 27 - 38 seconds   CBC and Auto Differential   Result Value Ref Range    WBC 10.6 4.4 - 11.3 x10*3/uL    nRBC 0.0 0.0 - 0.0 /100 WBCs    RBC 3.71 (L) 4.50 - 5.90 x10*6/uL    Hemoglobin 11.4 (L) 13.5 - 17.5 g/dL    Hematocrit 34.0 (L) 41.0 - 52.0 %    MCV 92 80 - 100 fL    MCH 30.7 26.0 - 34.0 pg    MCHC 33.5 32.0 - 36.0 g/dL    RDW 13.7 11.5 - 14.5 %    Platelets 145 (L) 150 - 450 x10*3/uL    Neutrophils % 69.1 40.0 - 80.0 %    Immature Granulocytes %, Automated 0.2 0.0 -  0.9 %    Lymphocytes % 16.7 13.0 - 44.0 %    Monocytes % 11.7 2.0 - 10.0 %    Eosinophils % 2.1 0.0 - 6.0 %    Basophils % 0.2 0.0 - 2.0 %    Neutrophils Absolute 7.31 1.20 - 7.70 x10*3/uL    Immature Granulocytes Absolute, Automated 0.02 0.00 - 0.70 x10*3/uL    Lymphocytes Absolute 1.76 1.20 - 4.80 x10*3/uL    Monocytes Absolute 1.24 (H) 0.10 - 1.00 x10*3/uL    Eosinophils Absolute 0.22 0.00 - 0.70 x10*3/uL    Basophils Absolute 0.02 0.00 - 0.10 x10*3/uL   POCT GLUCOSE   Result Value Ref Range    POCT Glucose 106 (H) 74 - 99 mg/dL   ECG 12 Lead   Result Value Ref Range    Ventricular Rate 73 BPM    Atrial Rate 73 BPM    ME Interval 146 ms    QRS Duration 86 ms    QT Interval 386 ms    QTC Calculation(Bazett) 425 ms    P Axis 47 degrees    R Axis 10 degrees    T Axis 52 degrees    QRS Count 12 beats    Q Onset 226 ms    P Onset 153 ms    P Offset 206 ms    T Offset 419 ms    QTC Fredericia 412 ms      ECG 12 Lead    Result Date: 12/8/2023  Normal sinus rhythm Inferior infarct , age undetermined Possible Anterior infarct , age undetermined Abnormal ECG When compared with ECG of 07-DEC-2023 13:42, Borderline criteria for Anterior infarct are now Present Inferior infarct is now Present    XR chest 1 view    Result Date: 12/8/2023  Interpreted By:  Dacia Wolf, STUDY: XR CHEST 1 VIEW;  12/8/2023 12:48 am   INDICATION: Signs/Symptoms:Hypoxia.   COMPARISON: Chest x-ray 12/07/2023   ACCESSION NUMBER(S): [id number]   ORDERING CLINICIAN: DIETER GOMEZ   FINDINGS: Interval removal of endotracheal and enteric tubes. Right IJ [place]-Herminia catheter terminates in the right pulmonary artery. Mediastinal drain and bilateral chest tubes are stable in position. Midline sternotomy wires and epicardial leads noted. Overlying leads are also present.   CARDIOMEDIASTINAL SILHOUETTE: Cardiac silhouette is enlarged but stable.   LUNGS: Bandlike opacities in the right lung may relate to atelectasis. There is retrocardiac airspace opacity  with blunting of the costophrenic angle which may relate to small effusion and atelectasis. No significant interval change. No definite pneumothorax.   ABDOMEN: No remarkable upper abdominal findings.   BONES: No acute osseous abnormality.       Bibasilar opacities may relate to small effusion and atelectasis. Superimposed infection not excluded. Attention on continued follow-up is advised.   Support hardware as described above.   MACRO: None   Signed by: Dacia Wolf 12/8/2023 1:22 AM Dictation workstation:   [id number]    ECG 12 Lead    Result Date: 12/7/2023  Normal sinus rhythm Normal ECG When compared with ECG of 06-DEC-2023 12:56, (unconfirmed) No significant change was found Confirmed by Mario Lennon ([id number]) on 12/7/2023 4:13:49 PM    ECG 12 lead (Clinic Performed)    Result Date: 12/7/2023  Normal sinus rhythm Inferior infarct (Cannot exclude) Abnormal ECG No previous ECGs available Confirmed by Mario Lennon ([id number]) on 12/7/2023 4:04:48 PM    XR chest 1 view    Result Date: 12/7/2023  Interpreted By:  Waldo Carmichael, STUDY: XR CHEST 1 VIEW;  12/7/2023 2:23 pm   INDICATION: Signs/Symptoms:Post op cardiac surgery.   COMPARISON: 12/07/2023 at 8:55 a.m..   ACCESSION NUMBER(S): [id number]   ORDERING CLINICIAN: BREANNE YAÑEZ   FINDINGS: CARDIOMEDIASTINAL SILHOUETTE: Endotracheal tube is now seen with tip projecting approximately 4.5 cm above the inna level. NG tube is now seen extending to the stomach level with tip not visualized. [place]-Herminia catheter from right jugular approach remains in place with tubing tip again projecting at the right main pulmonary artery level. There have been interval postoperative changes of the mediastinum with new median sternotomy wires and scattered surgical clips. Borderline size of the cardiac silhouette and mild aortic prominence is again seen, partially exaggerated by lower inspiratory volume. There is also a new cephalad directed midline probable mediastinal  drain.   LUNGS: Bilateral cephalad directed chest tubes are now seen. Inspiratory volume is low. Elevation of the right hemidiaphragm again seen. New irregular regions of atelectasis and/or small infiltrates are seen in the perihilar regions bilaterally and left base. Small effusions not excluded. No appreciable pneumothorax.   ABDOMEN: No remarkable upper abdominal findings.   BONES: Bones are stable.       1.  Interval postoperative changes of the mediastinum with new life-support lines and tubes as detailed. 2. Low inspiratory volume with new irregular regions of atelectasis and or infiltrates in the perihilar regions bilaterally and left base.       MACRO: None.   Signed by: Waldo Carmichael 12/7/2023 3:09 PM Dictation workstation:   [id number]    XR chest 1 view    Result Date: 12/7/2023  Interpreted By:  Waldo Carmichael, STUDY: XR CHEST 1 VIEW;  12/7/2023 8:55 am   INDICATION: Signs/Symptoms:line placement.   COMPARISON: None.   ACCESSION NUMBER(S): [id number]   ORDERING CLINICIAN: NATHALIA TOLLIVER   FINDINGS: CARDIOMEDIASTINAL SILHOUETTE: There is a [place]-Herminia catheter from right jugular approach with tubing tip projecting near the right hilum at the right main pulmonary artery level. Cardiac silhouette is not significantly enlarged.   LUNGS: There is elevation of the right hemidiaphragm. Mild left basilar atelectasis is seen. No definite pleural effusion. No pneumothorax is seen.   ABDOMEN: No remarkable upper abdominal findings.   BONES: Multilevel endplate spurring present in the spine. Degenerative changes of the shoulders are partially visualized.       1.  [place]-Herminia catheter from right jugular approach has tubing tip projecting at the right main pulmonary artery level. 2. Mild left basilar atelectasis.       MACRO: None.   Signed by: Waldo Carmichael 12/7/2023 3:07 PM Dictation workstation:   [id number]    CT chest wo IV contrast    Result Date: 12/6/2023  Interpreted By:  Schoenberger, Joseph, STUDY:  CT CHEST WO IV CONTRAST;  12/6/2023 11:35 am   INDICATION: Signs/Symptoms:pre-operative cabg.   COMPARISON: None.   ACCESSION NUMBER(S): [id number]   ORDERING CLINICIAN: DENICE CARTY   TECHNIQUE: Helical data acquisition of the chest was obtained  without IV contrast material.  Images were reformatted in axial, coronal, and sagittal planes.   FINDINGS: LUNGS AND AIRWAYS: The trachea and central airways are patent. No endobronchial lesion.   There is a subpleural calcified nodule in the left lower lobe consistent with prior granulomatous disease. Measures less than 5 mm. There is no pleural effusion, pneumothorax, or airspace opacity. No other significant focal lung opacities are noted.   MEDIASTINUM AND NASRIN, LOWER NECK AND AXILLA: The visualized thyroid gland is within normal limits.   No evidence of thoracic lymphadenopathy by CT criteria.   Esophagus appears normal   HEART AND VESSELS: The the thoracic aorta is normal in course and caliber. The caliber of the ascending aorta approximates 3.5 cm. There are calcifications on the aortic valve. No calcifications of the ascending aorta. Mild calcifications in the arch and descending aorta.   Main pulmonary artery and its branches are normal in caliber.   There are severe coronary atherosclerotic calcification the study is not optimized for evaluation of coronary arteries.   The cardiac chambers are not enlarged.   No evidence of pericardial effusion.   UPPER ABDOMEN: The visualized subdiaphragmatic structures demonstrate no remarkable findings.   CHEST WALL AND OSSEOUS STRUCTURES: There are no suspicious osseous lesions. Multilevel degenerative changes are present       1.  No significant ascending aortic calcifications. See discussion above   MACRO: None   Signed by: Joseph Schoenberger 12/6/2023 12:37 PM Dictation workstation:   [id number]      FiO2 (%):  [32 %] 32 %      Intake/Output Summary (Last 24 hours) at 12/10/2023 1102  Last data filed at 12/10/2023  0500  Gross per 24 hour   Intake 1007.45 ml   Output 2150 ml   Net -1142.55 ml           Assessment/Plan:    I am currently managing this critically ill patient for the following problems:    Post Op Pain  Acute vs Subacute infarct of Lesa   Hx of Nicotine abuse  Hx of former IV drug use  Hx of HTN  Triple Vessel CAD s/p CABG x 3  Hyperglycemia   Post Op Respiratory Insufficiency     Neuro/Psych/Pain Ctrl/Sedation:  Post-Op Pain  Former IV drug use  Hx of Nicotine abuse   Acute vs Subacute Infarct of Lesa   MRI did show subtle 5 mm focus of diffusion restriction consistent with acute to early subacute infarct is present in the dorsal tegmentum of the lesa, of the right of midline, immediately anterior to the 4th ventricle near the expected location of the right medial longitudinal fasciculus and right nucleus of cranial nerve 6. There is slight local mass effect without evidence of hemorrhagic transformation.    -Incisional pain  -Discontinue narcotics, pain control with tylenol and Toradol   -Neuro consulted for infarct, recommending plavix- okay to start per CTS   -Neurochecks per ICU protocol     Respiratory/ENT:  Post-Op Respiratory Insufficiency- resolved   -Hx of PPD smoking, no documented pulmonary insufficiency   -Patient was extubated on 12/7 to NC  -Given 20 mg IV Lasix for diuresis yesterday will give another 20mg IV Lasix today   -Refusing to wear NC however when on NC SPO2  above 92%  -Wean supplemental oxygen for spO2 goal > 92%  -ABG as needed  -TID duonebs  -Daily CXR  -Mucinex Daily     Cardiovascular:  Triple Vessel CAD s/p CABG x 3  Patient underwent on pump CABG x3 LIMA-LAD, RA-OM, SVG-PDA.   -swan and montes removed 12/8  -V wires cut 12/8 in prep for MRI   -2 pleural, 1 mediastinal chest tube removed 12/8  -Daily BMP, keep K >4, Mg > 2  -Continue ASA and statin   - Home BB resumed 50mg BID on 12/8   -CTS following and appreciate further management       GI:  Possible post op ileus  patient  "states he is passing gas, no bowel movement  PPI for GI prophylaxis  Bowel regimen  IV Reglan for GI motility   NPO with sips and chips      Renal/Volume Status (Intra & Extravascular):  Daily CMP  Keep MG >2 and K >4  Replace PRN per protocol   Hourly Is and Os  Maintain Diggs Cathete    Endocrine  Hyperglycemia  -No history of DM, likely stress induced  -Strict blood glucose control post-operatively  -Glucose goal   -Insulin gtt vs SSI per ICU protoco    Infectious Disease:  Leukocytosis 13.9--11.4  likely reactive  Trend CBC  Monitor for sx of SIRS    Heme/Onc:  Acute Blood Loss Anemia Post-Op  -Baseline Hgb 14.0 today 11.4,   -Daily CBC  -Transfuse for Hgb goal > 7 or massive blood loss with hemodynamic instability     OBGYN/MSK:  PT and OT POD # 1  OOB as tolerated       Ethics/Code Status:  Full Code     [facility] Cardiothoracic Surgery/ICU Quality Check      New Onset Organ Failure (ATA, Shock, ALI etc): No  >2 Vasopressors/Inotropes (Levophed > 0.1mcg/kg/min + Vasopressin) for more than 8 hours: No  Ongoing Blood Product Transfusion: No  Sepsis/New Infection: No  Delirium: no  Readmission to ICU: No  Family/Patient Concerns: concerned for rehab for eye sight following stroke. Patient was given eye patch for time being.     If any \"yes\" to the above, action plan as noted below: Neurology following for infarct - Rounds completed in conjunction with CTS NP     :  DVT Prophylaxis: lovenox   GI Prophylaxis: PPI  Bowel Regimen: miralax, ducalox, suppository PRN  Diet: NPO with sips and chips   CVC: remove cordis today   Mildred: no  Diggs: no  Restraints: none  Dispo: ICU    Critical Care Time:  45    Plan Discussed with Dr. Claribel Callaway APRN, CNP  Critical Care Medicine   [facility]    "

## 2023-12-10 NOTE — NURSING NOTE
23:25 Patient out of bed to use bedside commode without calling for assistance. RN saw he was tangled around his IV and came to assist him. Patient requested privacy and the RN tried to explain she needed to untangle him so he was safe first. Patient then became very agitated. 3 additional RN came to the bedside to attempt to de-escalate. The patient grew increasingly agitated and aggressive threatening to pull his central line out of his neck and leave against medical advice. Security was notified and came to the bedside to assist. NA Fam was notified and came to the bedside as well. Patient stated he was angry he was NPO he didn't understand why he couldn't drink water or go to the bathroom alone. Patient educated again on safety precautions and his NPO order. Able to get patient back to bed safely and a message was left for his wife regarding the situation.

## 2023-12-10 NOTE — PROGRESS NOTES
Liban Jaramillo is a 58 y.o. male on day 3 of admission presenting with Coronary artery disease involving native coronary artery of native heart with unstable angina pectoris (CMS/HCC).    Subjective   Liban Celeste is a 58 y.o. male with past medical history of hypertension, hyperlipidemia, hypothyroidism, substance abuse, smoker, cervical stenosis and radiculopathy and left rotator cuff injury. Over the last several months he has noted increasing symptoms of chest pain and dyspnea on exertion. He was evaluated at [facility] on 10/23/2023 for symptoms of recurrent chest pain.  At that time laboratory studies were unremarkable except for his elevated TSH. He was then seen by Dr. Rodriguez and was scheduled for cardiac catheterization. In the interim he did have an episode of recurrent angina and required evaluation at [facility] emergency room where he was seen by Dr. Casillas who performed cardiac catheterization there. Cardiac cath showed 90% LAD stenosis and 95% left circumflex stenosis as well as 50% left main, right coronary was without significant disease and left ventricular ejection fraction at that time was normal at 60%. Dr. Casillas felt that his findings warranted bypass surgery and he was referred to cardiovascular surgery at [facility].     On 12/7, patient underwent elective CABG x 3; LIMA-LAD, RA-OM, SVG-PDA. Cross clamp time was 57 min, on pump for 69 min. He returned to ICU in stable postoperative condition with Ventricular wires, 1 mediastinal chest tube, 1 R pleural chest tube and 1 left pleural chest tube, intubated and sedated on propofol.     POD#1: Pt was extubated yesterday evening without complication. He did have episode of concern for worsening hypoxia, agitation and severe pain in upper back late last night. At that time he was given 0.5 mg hydromorphone with mild improvement in his pain but remained intermittently agitated and hypoxic on non-rebreather with spo2 88-90%. ABG was  drawn which showed pH 7.35, pCO2 43, pO2 62, SaO2 90 on 100% FiO2. Stat x-ray was performed which looks similar in appearance to the prior x-ray, chest tubes in appropriate position, patient with multiple areas of atelectasis and low inspiratory volume. He was placed on CPAP to help with lung recruitment overnight and was able to be weaned off to 8L this AM. On exam this morning he remains afebrile and hemodynamically stable. Cardene infusion was weaned off and Metoprolol tartrate 50mg BID was resumed. Imdur 30mg daily also resumed. He is maintaining adequate oxygen saturation on 8L NC. CXR this morning showing bandlike opacities in the right lung may relate to atelectasis. There is retrocardiac airspace opacity with blunting of the costophrenic angle which may relate to small effusion and atelectasis. No significant interval change. No definite pneumothorax. He did receive Lasix 20mg IV x 1 dose this morning will good response. Will encourage IS and deep breathing frequently. Pain regimen adjusted to patent level of comfort. On exam he was very anxious and complaining of moderate back pain. Will continue Ofirmev IV q 6hrs scheduled x 4 doses, dilaudid 0.4mg q2rs prn, for breakthrough pain, Roxicodone 5-10mg q4hrs PRN will add gabapentin 300mg q8hrs scheduled and lidocaine patch q12hrs. Will continue bowel regimen as ordered, he states he has not passed any flatus. Can increase diet as tolerated once pt starts to pass gas. Denies n/v. Chest tubes with trivial serosanguinous drainage. Can remove chest tubes after patient ambulates and there is no evidence of dumping, Okay to start heparin subcutaneous once CT are removed. Increase activity as tolerated; PT/OT following.     POD#2: Yesterday around 1700 patient complaining of double vision. When testing his extraocular muscles he appeared to have a left 6th nerve palsy. Double vision resolves when left eye covered. He has 5 out of 5 motor strength in all extremities.  Sensory is intact on his face and extremities. Decision made to cut pacing wires and obtained a stat MRI as the patient high risk for stroke. MRI did show subtle 5 mm focus of diffusion restriction consistent with acute to early subacute infarct is present in the dorsal tegmentum of the david, of the right of midline, immediately anterior to the 4th ventricle near the expected location of the right medial longitudinal fasciculus and right nucleus of cranial nerve 6. There is slight local mass effect without evidence of hemorrhagic transformation. Neurology consulted awaiting further recommendations. He remains afebrile and hemodynamically stable. He is maintaining adequate oxygen saturation on 4L NC. Of note he has been refusing to wear 02. CXR this morning showing previously visualized multifocal infiltrates show some improvement from prior with residual irregular bilateral perihilar and left infrahilar retrocardiac infiltrates. Agree with diuresis again today with Lasix 20mg x 1 dose. Pain is controlled on current regimen. Patient states he is not passing gas but is able to tolerate diet. Denies n/v and abdominal pain. Will obtain KUB to r/o any obstruction or ileus. Continue to increase activity as tolerated. Will keep in ICU over weekend for close monitoring.     POD#3: Overnight patient became very aggressive with staff to the point that security had to be called.  He was again threatening to leave AMA.  Patient was also found to be smoking cigarettes in the room.  Cigarettes were found in patient's bed by staff last night.  Patient able to be calmed down and lengthy discussion with him of importance of staying in the hospital postoperatively and he reluctantly agreed.  This morning on exam he is alert and oriented x 3.  He is still complaining of double vision in primary gaze and lateral gaze on the left side. He denies any weakness, numbness, difficulty with speech and swallowing. Patient started on Plavix per  neurology. He is up walking in room. He remains afebrile and hemodynamically stable. Sinus rhythm on monitor.  KUB done yesterday showing mild distention of bowel segments, probable ileus.  Patient was made n.p.o. at that time Reglan was ordered as well as maintenance IV fluids.  KUB this morning showing mild generalized gaseous distention of bowel similar to prior more suggestive of ileus.  Patient remains n.p.o. with medications, he is now passing flatus. He denies n/v.  He has not had a bowel movement postoperatively, will continue bowel regimen as ordered and discontinue all narcotics.  He is maintaining adequate oxygen saturation on room air.  He still is refusing to wear O2 as needed.  Chest x-ray this morning showing low inspiratory volume.  Left basilar retrocardiac small irregular opacities may represent atelectasis versus small infiltrate. Small left effusion not excluded. Right lung is clear. No pneumothorax is seen.  Agree with gentle diuresis again today with Lasix 20 mg x 1 dose.  Pain is controlled on current regimen, will discontinue all narcotics and start Ultram 50 mg in conjunction with all other nonnarcotic pain regimen.  Continue to increase activity as tolerated, PT/ OT following.  If stable patient can be transferred to floor tomorrow with possible discharge tentatively in 24 to 48 hours.  Objective     Physical Exam  Vitals and nursing note reviewed.   Constitutional:       General: He is not in acute distress.     Appearance: Normal appearance. He is well-developed. He is not ill-appearing.   HENT:      Head: Normocephalic and atraumatic.   Eyes:      General: Visual field deficit present.      Extraocular Movements:      Left eye: Abnormal extraocular motion present.      Pupils: Pupils are equal, round, and reactive to light. Pupils are equal.      Comments: Left 6th nerve palsy   +double vision in primary and lateral gaze   Neck:      Thyroid: No thyroid mass.      Vascular: No JVD.       Comments: DOMINIQUE patel removed 12/8    RIJ introducer   Cardiovascular:      Rate and Rhythm: Normal rate and regular rhythm.      Pulses: Normal pulses.           Posterior tibial pulses are 2+ on the right side and 2+ on the left side.      Heart sounds: Normal heart sounds.      Comments: Wires clipped   Pulmonary:      Effort: Pulmonary effort is normal.      Breath sounds: Decreased air movement present. Examination of the right-lower field reveals decreased breath sounds and rhonchi. Examination of the left-lower field reveals decreased breath sounds and rhonchi. Decreased breath sounds and rhonchi present.   Chest:      Comments: Mid sternal incision MARYLIN, no evidence of bleeding or erythema  Chest tubes removed 12/8- CT side DSD intact   Abdominal:      General: Bowel sounds are decreased. There is distension.      Palpations: Abdomen is soft.      Tenderness: There is no abdominal tenderness.      Comments: + passing flatus  +mild distention and mildly firm to touch    Genitourinary:     Comments: Diggs removed 12/8  Voiding clear yellow urine per urinal   Musculoskeletal:      Right lower leg: No edema.      Left lower leg: No edema.      Comments: Generalized post-op weakness  5/5 motor strength in all extremities    Skin:     General: Skin is warm.      Comments: Right radial vein harvest site without evidence of bleeding or erythema.  Right upper thigh harvest site with moderate bruising, no evidence of erythema.   Neurological:      General: No focal deficit present.      Mental Status: He is alert and oriented to person, place, and time.   Psychiatric:         Attention and Perception: Attention normal.         Mood and Affect: Mood is anxious. Affect is labile and angry.         Speech: Speech normal.         Behavior: Behavior is uncooperative, agitated and aggressive.         Judgment: Judgment is impulsive.         Last Recorded Vitals  Blood pressure 110/70, pulse 76, temperature 36.7 °C (98.1 °F),  "temperature source Temporal, resp. rate 21, height 1.854 m (6' 0.99\"), weight 100 kg (221 lb 1.9 oz), SpO2 93 %.  Intake/Output last 3 Shifts:  I/O last 3 completed shifts:  In: 1507.5 (15 mL/kg) [P.O.:500; I.V.:907.5 (9 mL/kg); IV Piggyback:100]  Out: 4900 (48.9 mL/kg) [Urine:4900 (1.4 mL/kg/hr)]  Weight: 100.3 kg     Relevant Results              Current Facility-Administered Medications:     acetaminophen (Tylenol) tablet 650 mg, 650 mg, oral, q4h PRN **OR** [DISCONTINUED] acetaminophen (Tylenol) oral liquid 650 mg, 650 mg, nasogastric tube, q4h PRN **OR** [DISCONTINUED] acetaminophen (Tylenol) suppository 650 mg, 650 mg, rectal, q4h PRN, TAMEKA Abreu    acetylcysteine (Mucomyst) 200 mg/mL (20 %) nebulizer solution 600 mg, 3 mL, nebulization, TID, Waldo Price, DO, 600 mg at 12/10/23 0733    aspirin chewable tablet 81 mg, 81 mg, oral, Daily, Waldo Price DO, 81 mg at 12/10/23 1030    atorvastatin (Lipitor) tablet 80 mg, 80 mg, oral, Nightly, KENNEDY Abreu-CNP, 80 mg at 12/08/23 2013    bisacodyl (Dulcolax) EC tablet 10 mg, 10 mg, oral, Daily PRN, KENNEDY Abreu-CNP, 10 mg at 12/10/23 1036    clopidogrel (Plavix) tablet 75 mg, 75 mg, oral, Daily, KENNEDY Abreu-CNP, 75 mg at 12/10/23 1029    dextrose 50 % injection 25 g, 25 g, intravenous, q15 min PRN **OR** glucagon (Glucagen) injection 1 mg, 1 mg, intramuscular, q15 min PRN, TAMEKA Zelaya    docusate sodium (Colace) capsule 100 mg, 100 mg, oral, BID, Neetu Callaway APRN-CNP, 100 mg at 12/10/23 1046    fluticasone (Flonase) nasal spray 2 spray, 2 spray, Each Nostril, Daily, Neetu Callaway APRN-CNP, 2 spray at 12/10/23 1028    gabapentin (Neurontin) capsule 300 mg, 300 mg, oral, q8h BRANDON, Neetu Callaway APRN-CNP, 300 mg at 12/10/23 1031    guaiFENesin (Mucinex) 12 hr tablet 600 mg, 600 mg, oral, BID PRN, Danielle Terrell APRN-CNP, 600 mg at 12/10/23 1036    heparin (porcine) injection " 5,000 Units, 5,000 Units, subcutaneous, q8h, KENNEDY Abreu-CNP, 5,000 Units at 12/10/23 0348    ipratropium-albuteroL (Duo-Neb) 0.5-2.5 mg/3 mL nebulizer solution 3 mL, 3 mL, nebulization, TID, KENNEDY Abreu-CNP, 3 mL at 12/10/23 0733    isosorbide mononitrate ER (Imdur) 24 hr tablet 30 mg, 30 mg, oral, Daily, Waldo Price DO, 30 mg at 12/10/23 1030    labetaloL (Normodyne,Trandate) injection 10 mg, 10 mg, intravenous, q6h PRN, TAMEKA Julian, 10 mg at 12/08/23 2335    lactated Ringer's infusion, 75 mL/hr, intravenous, Continuous, TAMEKA Abreu, Last Rate: 75 mL/hr at 12/10/23 0526, 75 mL/hr at 12/10/23 0526    levothyroxine (Synthroid, Levoxyl) tablet 88 mcg, 88 mcg, oral, Daily, KENNEDY Abreu-CNP, 88 mcg at 12/09/23 0514    lidocaine 4 % patch 1 patch, 1 patch, transdermal, Daily, Waldo Price DO, 1 patch at 12/10/23 1022    lisinopril tablet 20 mg, 20 mg, oral, Daily, KENNEDY Julian-CNP, 20 mg at 12/10/23 1027    magnesium sulfate IV 2 g, 2 g, intravenous, q6h PRN, KENNEDY Abreu-CNP, Stopped at 12/09/23 0755    magnesium sulfate IV 4 g, 4 g, intravenous, q6h PRN, KENNEDY Abreu-CNP, Stopped at 12/10/23 0904    metoclopramide (Reglan) injection 10 mg, 10 mg, intravenous, TID, KENNEDY Abreu-CNP, 10 mg at 12/10/23 1023    metoprolol tartrate (Lopressor) tablet 50 mg, 50 mg, oral, BID, Waldo Price DO, 50 mg at 12/10/23 1028    nicotine (Nicoderm CQ) 14 mg/24 hr patch 1 patch, 1 patch, transdermal, Daily, TAMEKA Abreu, 1 patch at 12/10/23 1022    ondansetron (Zofran) tablet 4 mg, 4 mg, oral, q8h PRN **OR** ondansetron (Zofran) injection 4 mg, 4 mg, intravenous, q8h PRN, TAMEKA Abreu    oxygen (O2) therapy, , inhalation, Continuous PRN - O2/gases, Paras Lewis, KENNEDY-CNP    pantoprazole (ProtoNix) EC tablet 40 mg, 40 mg, oral, Daily before breakfast, 40 mg at 12/09/23 0514 **OR**  pantoprazole (ProtoNix) injection 40 mg, 40 mg, intravenous, Daily before breakfast, Neetu R Cesia, APRN-CNP, 40 mg at 12/10/23 0504    polyethylene glycol (Glycolax, Miralax) packet 17 g, 17 g, oral, Daily, Neetu R Callaway, APRN-CNP, 17 g at 12/10/23 1026    potassium chloride CR (Klor-Con M20) ER tablet 20 mEq, 20 mEq, oral, q6h PRN **OR** potassium chloride (Klor-Con) packet 20 mEq, 20 mEq, oral, q6h PRN, Neetu R Callaway, APRN-CNP    potassium chloride CR (Klor-Con M20) ER tablet 40 mEq, 40 mEq, oral, q6h PRN **OR** potassium chloride (Klor-Con) packet 40 mEq, 40 mEq, oral, q6h PRN, Neetu R Callaway, APRN-CNP    potassium chloride 20 mEq in 100 mL IV premix, 20 mEq, intravenous, q6h PRN, Neetu R Callaway, APRN-CNP, Stopped at 12/10/23 0704    potassium chloride 40 mEq in 100 mL IV premix, 40 mEq, intravenous, q6h PRN, Neetu R Callaway, APRN-CNP    traMADol (Ultram) tablet 50 mg, 50 mg, oral, q6h PRN, Neetu R Callaway, APRN-CNP     Results for orders placed or performed during the hospital encounter of 12/07/23 (from the past 24 hour(s))   POCT GLUCOSE   Result Value Ref Range    POCT Glucose 140 (H) 74 - 99 mg/dL   Magnesium   Result Value Ref Range    Magnesium 1.67 1.60 - 2.40 mg/dL   Renal Function Panel   Result Value Ref Range    Glucose 107 (H) 74 - 99 mg/dL    Sodium 134 (L) 136 - 145 mmol/L    Potassium 3.8 3.5 - 5.3 mmol/L    Chloride 104 98 - 107 mmol/L    Bicarbonate 24 21 - 32 mmol/L    Anion Gap 10 10 - 20 mmol/L    Urea Nitrogen 13 6 - 23 mg/dL    Creatinine 0.83 0.50 - 1.30 mg/dL    eGFR >90 >60 mL/min/1.73m*2    Calcium 8.5 (L) 8.6 - 10.3 mg/dL    Phosphorus 2.5 2.5 - 4.9 mg/dL    Albumin 3.4 3.4 - 5.0 g/dL   Coagulation Screen   Result Value Ref Range    Protime 14.9 (H) 9.8 - 12.8 seconds    INR 1.3 (H) 0.9 - 1.1    aPTT 30 27 - 38 seconds   CBC and Auto Differential   Result Value Ref Range    WBC 10.6 4.4 - 11.3 x10*3/uL    nRBC 0.0 0.0 - 0.0 /100 WBCs    RBC 3.71 (L) 4.50 - 5.90 x10*6/uL     Hemoglobin 11.4 (L) 13.5 - 17.5 g/dL    Hematocrit 34.0 (L) 41.0 - 52.0 %    MCV 92 80 - 100 fL    MCH 30.7 26.0 - 34.0 pg    MCHC 33.5 32.0 - 36.0 g/dL    RDW 13.7 11.5 - 14.5 %    Platelets 145 (L) 150 - 450 x10*3/uL    Neutrophils % 69.1 40.0 - 80.0 %    Immature Granulocytes %, Automated 0.2 0.0 - 0.9 %    Lymphocytes % 16.7 13.0 - 44.0 %    Monocytes % 11.7 2.0 - 10.0 %    Eosinophils % 2.1 0.0 - 6.0 %    Basophils % 0.2 0.0 - 2.0 %    Neutrophils Absolute 7.31 1.20 - 7.70 x10*3/uL    Immature Granulocytes Absolute, Automated 0.02 0.00 - 0.70 x10*3/uL    Lymphocytes Absolute 1.76 1.20 - 4.80 x10*3/uL    Monocytes Absolute 1.24 (H) 0.10 - 1.00 x10*3/uL    Eosinophils Absolute 0.22 0.00 - 0.70 x10*3/uL    Basophils Absolute 0.02 0.00 - 0.10 x10*3/uL   POCT GLUCOSE   Result Value Ref Range    POCT Glucose 106 (H) 74 - 99 mg/dL   ECG 12 Lead   Result Value Ref Range    Ventricular Rate 73 BPM    Atrial Rate 73 BPM    CA Interval 146 ms    QRS Duration 86 ms    QT Interval 386 ms    QTC Calculation(Bazett) 425 ms    P Axis 47 degrees    R Axis 10 degrees    T Axis 52 degrees    QRS Count 12 beats    Q Onset 226 ms    P Onset 153 ms    P Offset 206 ms    T Offset 419 ms    QTC Fredericia 412 ms      XR abdomen 1 view    Result Date: 12/10/2023  Interpreted By:  Waldo Carmichael, STUDY: XR ABDOMEN 1 VIEW;  12/10/2023 5:33 am   INDICATION: Signs/Symptoms:probable ileus.   COMPARISON: 12/09/2023.   ACCESSION NUMBER(S): [id number]   ORDERING CLINICIAN: BREANNE YAÑEZ   FINDINGS: 2 frontal views of the abdomen were obtained.   Left basilar retrocardiac atelectasis and or infiltrate is partially visualized. Mild generalized gaseous distention of bowel is again seen along with moderate right colonic fecal residue. Some gas is evident at the distal colon and rectum level. No appreciable renal calculi.  Bones are intact.       1.  Mild generalized gaseous distention of bowel similar to prior more suggestive of ileus.    Signed by: Waldo Carmichael 12/10/2023 7:39 AM Dictation workstation:   [id number]    XR chest 1 view    Result Date: 12/10/2023  Interpreted By:  Waldo Carmichael, STUDY: XR CHEST 1 VIEW;  12/10/2023 5:33 am   INDICATION: Signs/Symptoms:Post op cardiac surgery.   COMPARISON: 12/09/2023.   ACCESSION NUMBER(S): [id number]   ORDERING CLINICIAN: BREANNE YAÑEZ   FINDINGS: CARDIOMEDIASTINAL SILHOUETTE: Right jugular central line remains in place with tubing tip at the SVC level. Cardiomegaly, aortic calcifications and postoperative changes of the mediastinum are again seen.   LUNGS: Inspiratory volume is low. There is mild elevation of the right hemidiaphragm. Left basilar retrocardiac small irregular opacities may represent atelectasis versus small infiltrate. Small left effusion not excluded. Right lung is clear. No pneumothorax is seen.   ABDOMEN: No remarkable upper abdominal findings.   BONES: Bones are stable.       1.  Left basilar retrocardiac atelectasis versus small infiltrate.       MACRO: None.   Signed by: Waldo Carmichael 12/10/2023 7:38 AM Dictation workstation:   [id number]    ECG 12 Lead    Result Date: 12/10/2023  Normal sinus rhythm Normal ECG When compared with ECG of 09-DEC-2023 07:54, (unconfirmed) ST elevation now present in Inferior leads    XR abdomen 1 view    Result Date: 12/9/2023  Interpreted By:  Celestino Martinez, STUDY: XR ABDOMEN 1 VIEW;  12/9/2023 3:17 pm   INDICATION: Signs/Symptoms:hypoactive bowel sounds.   COMPARISON: None.   ACCESSION NUMBER(S): [id number]   ORDERING CLINICIAN: BREANNE YAÑEZ   FINDINGS: Mild distention of bowel segments, particularly the: Suggesting colonic ileus. Distal obstruction is not excluded. Follow-up as clinically warranted. The study is of limited evaluation of pneumoperitoneum on supine imaging, however no gross evidence of free air is noted.   The soft tissue shadows are unremarkable.   Visualized lungs bases are clear.   Osseous structures  demonstrate no acute bony changes.   Other findings: None significant       1.  Probable ileus   Signed by: Celestino Martinez 12/9/2023 3:22 PM Dictation workstation:   [id number]    ECG 12 Lead    Result Date: 12/9/2023  Normal sinus rhythm Low voltage QRS Borderline ECG When compared with ECG of 08-DEC-2023 06:32, (unconfirmed) No significant change was found    XR chest 1 view    Result Date: 12/9/2023  Interpreted By:  Fernanda Carmichael, STUDY: XR CHEST 1 VIEW;  12/9/2023 5:15 am   INDICATION: Signs/Symptoms:post op cabg.   COMPARISON: 12/08/2023.   ACCESSION NUMBER(S): [id number]   ORDERING CLINICIAN: BREANNE YAÑEZ   FINDINGS: CARDIOMEDIASTINAL SILHOUETTE: Right jugular line again seen with tip at the distal SVC level. Cardiomegaly, aortic calcifications and postoperative changes of the mediastinum are again seen.   LUNGS: Previously visualized multifocal infiltrates show some improvement from prior with residual irregular bilateral perihilar and left infrahilar retrocardiac infiltrate. Small pleural effusions not excluded. No appreciable pneumothorax.   ABDOMEN: No remarkable upper abdominal findings.   BONES: Bones are stable.       1.  Previously visualized multifocal infiltrates show some improvement from prior with residual irregular bilateral perihilar and left infrahilar retrocardiac infiltrates.       MACRO: None.   Signed by: Fernanda Carmichael 12/9/2023 6:40 AM Dictation workstation:   [id number]    MR orbit wo IV contrast    Result Date: 12/9/2023  Interpreted By:  Shira Benson, STUDY: MR BRAIN WO IV CONTRAST; MR ORBIT WO IV CONTRAST;  12/8/2023 10:20 pm   INDICATION: Signs/Symptoms:diploplia, ataxia, r/o stroke; Signs/Symptoms:EOM Palsy.   COMPARISON: None.   ACCESSION NUMBER(S): [id number]; [id number]   ORDERING CLINICIAN: FERNANDA NG   TECHNIQUE: Multiplanar and multisequence MRI of the brain was performed without administration of intravenous contrast.   Additionally, multiplanar  and multisequence MRI images of the orbits were obtained, without administration of intravenous contrast.   FINDINGS: MRI BRAIN:   A subtle 5 mm focus of diffusion restriction is present in the dorsal tegmentum of the david (series 7, image 14), to the right of midline, immediately anterior to the 4th ventricle, to the right of midline, near the expected location of the right medial longitudinal fasciculus in the nucleus of cranial nerve 6. There is slight associated FLAIR hyperintense signal with minimal local mass effect, without evidence of hemorrhagic transformation.   No additional areas of abnormal diffusion restriction are identified intracranially.   There is no evidence of hemorrhage. No mass effect or midline shift. No abnormal hemosiderin staining is present intracranially.   There is no ventricular dilatation. Basal cisterns are patent. No extra-axial fluid collections are present.   Within limits of motion degraded exam, several subtle foci of hyperintense FLAIR and T2 signal present in the periventricular and subcortical white matter bilateral cerebral hemispheres are nonspecific, but favored to represent changes of microvascular disease.   Visualized large arterial flow voids, including intracranial carotid and basilar artery are preserved.   Small amount of T2 hyperintense fluid signal is present in the inferior left mastoid air cells, otherwise mastoid air cells are unremarkable in appearance.   MRI ORBITS:   Exam is somewhat degraded by motion.   Within limitations of the study, bony orbits are intact. Periorbital soft tissues, and intraorbital structures are symmetric in appearance.   No STIR hyperintense edema is present in the coronal or extraconal fat. Oculomotor muscles are unremarkable in appearance.   Optic nerves are symmetric in size without evidence of atrophy or STIR hyperintense edema bilaterally. Globes are symmetric in size and otherwise unremarkable.   Lacrimal glands are unremarkable  in appearance.   There is near complete opacification of the hypoplastic left maxillary sinus with mucosal thickening and secretions present in the right maxillary sinus.       MRI BRAIN: 1. Subtle 5 mm focus of diffusion restriction consistent with acute to early subacute infarct is present in the dorsal tegmentum of the david, of the right of midline, immediately anterior to the 4th ventricle near the expected location of the right medial longitudinal fasciculus and right nucleus of cranial nerve 6. There is slight local mass effect without evidence of hemorrhagic transformation. 2. No additional areas of diffusion restriction or acute abnormalities are identified intracranially. 3. Within limitations of mildly motion degraded exam, several subtle foci of hyperintense FLAIR and T2 signal present in the periventricular and subcortical white matter of bilateral cerebral hemispheres are nonspecific, but favored to represent changes of microvascular disease.   MRI ORBITS: 1. No acute intraorbital pathology is identified. Optic nerves are symmetric in size without evidence of edema bilaterally, within limitations of somewhat motion degraded study.   MACRO: None   Signed by: Shira Benson 12/9/2023 12:36 AM Dictation workstation:   [id number]    MR brain wo IV contrast    Result Date: 12/9/2023  Interpreted By:  Shira Benson, STUDY: MR BRAIN WO IV CONTRAST; MR ORBIT WO IV CONTRAST;  12/8/2023 10:20 pm   INDICATION: Signs/Symptoms:diploplia, ataxia, r/o stroke; Signs/Symptoms:EOM Palsy.   COMPARISON: None.   ACCESSION NUMBER(S): [id number]; [id number]   ORDERING CLINICIAN: FERNANDA NG   TECHNIQUE: Multiplanar and multisequence MRI of the brain was performed without administration of intravenous contrast.   Additionally, multiplanar and multisequence MRI images of the orbits were obtained, without administration of intravenous contrast.   FINDINGS: MRI BRAIN:   A subtle 5 mm focus of diffusion  restriction is present in the dorsal tegmentum of the david (series 7, image 14), to the right of midline, immediately anterior to the 4th ventricle, to the right of midline, near the expected location of the right medial longitudinal fasciculus in the nucleus of cranial nerve 6. There is slight associated FLAIR hyperintense signal with minimal local mass effect, without evidence of hemorrhagic transformation.   No additional areas of abnormal diffusion restriction are identified intracranially.   There is no evidence of hemorrhage. No mass effect or midline shift. No abnormal hemosiderin staining is present intracranially.   There is no ventricular dilatation. Basal cisterns are patent. No extra-axial fluid collections are present.   Within limits of motion degraded exam, several subtle foci of hyperintense FLAIR and T2 signal present in the periventricular and subcortical white matter bilateral cerebral hemispheres are nonspecific, but favored to represent changes of microvascular disease.   Visualized large arterial flow voids, including intracranial carotid and basilar artery are preserved.   Small amount of T2 hyperintense fluid signal is present in the inferior left mastoid air cells, otherwise mastoid air cells are unremarkable in appearance.   MRI ORBITS:   Exam is somewhat degraded by motion.   Within limitations of the study, bony orbits are intact. Periorbital soft tissues, and intraorbital structures are symmetric in appearance.   No STIR hyperintense edema is present in the coronal or extraconal fat. Oculomotor muscles are unremarkable in appearance.   Optic nerves are symmetric in size without evidence of atrophy or STIR hyperintense edema bilaterally. Globes are symmetric in size and otherwise unremarkable.   Lacrimal glands are unremarkable in appearance.   There is near complete opacification of the hypoplastic left maxillary sinus with mucosal thickening and secretions present in the right maxillary  sinus.       MRI BRAIN: 1. Subtle 5 mm focus of diffusion restriction consistent with acute to early subacute infarct is present in the dorsal tegmentum of the david, of the right of midline, immediately anterior to the 4th ventricle near the expected location of the right medial longitudinal fasciculus and right nucleus of cranial nerve 6. There is slight local mass effect without evidence of hemorrhagic transformation. 2. No additional areas of diffusion restriction or acute abnormalities are identified intracranially. 3. Within limitations of mildly motion degraded exam, several subtle foci of hyperintense FLAIR and T2 signal present in the periventricular and subcortical white matter of bilateral cerebral hemispheres are nonspecific, but favored to represent changes of microvascular disease.   MRI ORBITS: 1. No acute intraorbital pathology is identified. Optic nerves are symmetric in size without evidence of edema bilaterally, within limitations of somewhat motion degraded study.   MACRO: None   Signed by: Shira Benson 12/9/2023 12:36 AM Dictation workstation:   [id number]    XR chest 1 view    Result Date: 12/8/2023  Interpreted By:  Coral Serrano, STUDY: XR CHEST 1 VIEW; ;   INDICATION: Signs/Symptoms:s/p ct removal.   COMPARISON: Chest radiograph 12/07/2023 and 12/08/2023. CT chest 12/06/2023.   ACCESSION NUMBER(S): [id number]   ORDERING CLINICIAN: YOLANDA SIN   FINDINGS: Median sternotomy. Right IJ central venous catheter with tip in the cavoatrial junction. Interval removal of right-sided chest tube. Hypoinflated lungs. Bibasilar and right mid lung atelectatic changes with linear opacities evident..   Cardiomediastinal silhouette is within normal limits for size and configuration. No sizable pleural effusion. No pneumothorax.       Please see findings   MACRO: None.   Signed by: Coral Serrano 12/8/2023 3:08 PM Dictation workstation:   [id number]                 Assessment/Plan    Principal Problem:    Coronary artery disease involving native coronary artery of native heart with unstable angina pectoris (CMS/HCC)  Triple Vessel CAD s/p CABG x 3: On pump CABG x3 LIMA-LAD, RA-OM, SVG-PDA  -Dr. Cheryl Murphy/ Dr. Fuller updated   - swan and montes removed 12/8  -dari to be removed 12/9  -cordis to be removed 12/10  -CT removed 12/8  -BB resumed 12/8 metoprolol 50mg BID  -ACE on hold  -Imdur 30mg resumed 12/8  -Continue ASA and statin  -Lasix 20mg IV x 1 dose 12/10; keep MG >2 and K>4  -Daily CBC, BMP, MG  -Discontinue all narcotics, continue gabapentin, noraflex and ultram   -Bowel regimen as ordered, add colace BID   -GI ppx with protonix   -subcutaneous heparin initiated 12/8 after ct removal   -Daily CXR and EKG  -Increase activity as tolerated; PT/OT following   -Keep in ICU over weekend for close monitoring, if stable can be transferred to floor tomorrow with tentative discharge in 24 to 48 hours.    Acute stoke post-operatively-yesterday around 1700 patient complaining of double vision. When testing his extraocular muscles he appeared to have a left 6th nerve palsy. Double vision resolves when left eye covered. He has 5 out of 5 motor strength in all extremities. Sensory is intact on his face and extremities. Decision made to cut pacing wires and obtained a stat MRI as the patient high risk for stroke. MRI did show subtle 5 mm focus of diffusion restriction consistent with acute to early subacute infarct is present in the dorsal tegmentum of the david, of the right of midline, immediately anterior to the 4th ventricle near the expected location of the right medial longitudinal fasciculus and right nucleus of cranial nerve 6. There is slight local mass effect without evidence of hemorrhagic transformation.   -Neuro consulted appreciate recommendations  -Plavix 75 mg daily initiated per neurology  -Keep MAP 80-90    Probable ileus post-operatively- KUB showing mild generalized gaseous  distention of bowel   suggestive of ileus  -NPO with meds  -discontinue all narcotics  -Maintenance IVF  -Reglan TID  -Increase activity as tolerated     Acute Blood Loss Anemia Post-Op  -Baseline Hgb 14.0  POD#1: Hgb-12.6; Plts-163k  POD#2: Hgb-11.5; Plts-148k  POD#3: Hgb-11.4; Plts-145k  -Daily CBC  -Transfuse for Hgb goal > 7 or massive blood loss with hemodynamic instability     Post-Op Respiratory Insufficiency/Hypoxia  -Hx of PPD smoking, no documented pulmonary insufficiency   -Patient was extubated on 12/7 to NC, pt did desat to the 80s was put on CPAP overnight and given 20 mg IV Lasixs for diuresis now on 4L NC   -Wean supplemental oxygen for spO2 goal > 92%  -ABG as needed  -TID duonebs  -Daily CXR  -Mucinex daily   -Nicotine patch        I spent 60 minutes in the professional and overall care of this patient.      Danielle Terrell, APRN-CNP

## 2023-12-10 NOTE — SIGNIFICANT EVENT
Chest tube sites. Sutures are intact. Site clean and dry. Small area above site has some oozing. Applied 4x4 drsg

## 2023-12-11 ENCOUNTER — HOME HEALTH ADMISSION (OUTPATIENT)
Dept: HOME HEALTH SERVICES | Facility: HOME HEALTH | Age: 58
End: 2023-12-11
Payer: COMMERCIAL

## 2023-12-11 ENCOUNTER — APPOINTMENT (OUTPATIENT)
Dept: RADIOLOGY | Facility: HOSPITAL | Age: 58
End: 2023-12-11
Payer: COMMERCIAL

## 2023-12-11 VITALS
DIASTOLIC BLOOD PRESSURE: 70 MMHG | RESPIRATION RATE: 27 BRPM | HEART RATE: 74 BPM | HEIGHT: 73 IN | WEIGHT: 213.63 LBS | TEMPERATURE: 98.8 F | BODY MASS INDEX: 28.31 KG/M2 | SYSTOLIC BLOOD PRESSURE: 103 MMHG | OXYGEN SATURATION: 96 %

## 2023-12-11 LAB
ALBUMIN SERPL BCP-MCNC: 3.3 G/DL (ref 3.4–5)
ANION GAP SERPL CALC-SCNC: 12 MMOL/L (ref 10–20)
APTT PPP: 31 SECONDS (ref 27–38)
BASOPHILS # BLD AUTO: 0.02 X10*3/UL (ref 0–0.1)
BASOPHILS NFR BLD AUTO: 0.2 %
BLOOD EXPIRATION DATE: NORMAL
BLOOD EXPIRATION DATE: NORMAL
BUN SERPL-MCNC: 10 MG/DL (ref 6–23)
CALCIUM SERPL-MCNC: 8.2 MG/DL (ref 8.6–10.3)
CHLORIDE SERPL-SCNC: 106 MMOL/L (ref 98–107)
CO2 SERPL-SCNC: 22 MMOL/L (ref 21–32)
CREAT SERPL-MCNC: 0.77 MG/DL (ref 0.5–1.3)
DISPENSE STATUS: NORMAL
DISPENSE STATUS: NORMAL
EOSINOPHIL # BLD AUTO: 0.43 X10*3/UL (ref 0–0.7)
EOSINOPHIL NFR BLD AUTO: 4.7 %
ERYTHROCYTE [DISTWIDTH] IN BLOOD BY AUTOMATED COUNT: 13.7 % (ref 11.5–14.5)
GFR SERPL CREATININE-BSD FRML MDRD: >90 ML/MIN/1.73M*2
GLUCOSE SERPL-MCNC: 122 MG/DL (ref 74–99)
HCT VFR BLD AUTO: 35.5 % (ref 41–52)
HGB BLD-MCNC: 12 G/DL (ref 13.5–17.5)
IMM GRANULOCYTES # BLD AUTO: 0.05 X10*3/UL (ref 0–0.7)
IMM GRANULOCYTES NFR BLD AUTO: 0.5 % (ref 0–0.9)
INR PPP: 1.1 (ref 0.9–1.1)
LYMPHOCYTES # BLD AUTO: 1.67 X10*3/UL (ref 1.2–4.8)
LYMPHOCYTES NFR BLD AUTO: 18.2 %
MAGNESIUM SERPL-MCNC: 2 MG/DL (ref 1.6–2.4)
MCH RBC QN AUTO: 30.8 PG (ref 26–34)
MCHC RBC AUTO-ENTMCNC: 33.8 G/DL (ref 32–36)
MCV RBC AUTO: 91 FL (ref 80–100)
MONOCYTES # BLD AUTO: 1.21 X10*3/UL (ref 0.1–1)
MONOCYTES NFR BLD AUTO: 13.2 %
NEUTROPHILS # BLD AUTO: 5.79 X10*3/UL (ref 1.2–7.7)
NEUTROPHILS NFR BLD AUTO: 63.2 %
NRBC BLD-RTO: 0 /100 WBCS (ref 0–0)
PHOSPHATE SERPL-MCNC: 2.6 MG/DL (ref 2.5–4.9)
PLATELET # BLD AUTO: 192 X10*3/UL (ref 150–450)
POTASSIUM SERPL-SCNC: 3.9 MMOL/L (ref 3.5–5.3)
PRODUCT BLOOD TYPE: 600
PRODUCT BLOOD TYPE: 600
PRODUCT CODE: NORMAL
PRODUCT CODE: NORMAL
PROTHROMBIN TIME: 12.3 SECONDS (ref 9.8–12.8)
RBC # BLD AUTO: 3.9 X10*6/UL (ref 4.5–5.9)
SODIUM SERPL-SCNC: 136 MMOL/L (ref 136–145)
UNIT ABO: NORMAL
UNIT ABO: NORMAL
UNIT NUMBER: NORMAL
UNIT NUMBER: NORMAL
UNIT RH: NORMAL
UNIT RH: NORMAL
UNIT VOLUME: 350
UNIT VOLUME: 350
WBC # BLD AUTO: 9.2 X10*3/UL (ref 4.4–11.3)
XM INTEP: NORMAL
XM INTEP: NORMAL

## 2023-12-11 PROCEDURE — 36415 COLL VENOUS BLD VENIPUNCTURE: CPT

## 2023-12-11 PROCEDURE — 99231 SBSQ HOSP IP/OBS SF/LOW 25: CPT | Performed by: NURSE PRACTITIONER

## 2023-12-11 PROCEDURE — 2500000001 HC RX 250 WO HCPCS SELF ADMINISTERED DRUGS (ALT 637 FOR MEDICARE OP): Performed by: NURSE PRACTITIONER

## 2023-12-11 PROCEDURE — 96372 THER/PROPH/DIAG INJ SC/IM: CPT

## 2023-12-11 PROCEDURE — 36415 COLL VENOUS BLD VENIPUNCTURE: CPT | Performed by: HOSPITALIST

## 2023-12-11 PROCEDURE — 2500000005 HC RX 250 GENERAL PHARMACY W/O HCPCS: Performed by: EMERGENCY MEDICINE

## 2023-12-11 PROCEDURE — 83735 ASSAY OF MAGNESIUM: CPT

## 2023-12-11 PROCEDURE — 2500000002 HC RX 250 W HCPCS SELF ADMINISTERED DRUGS (ALT 637 FOR MEDICARE OP, ALT 636 FOR OP/ED)

## 2023-12-11 PROCEDURE — 80069 RENAL FUNCTION PANEL: CPT

## 2023-12-11 PROCEDURE — 2500000004 HC RX 250 GENERAL PHARMACY W/ HCPCS (ALT 636 FOR OP/ED): Performed by: NURSE PRACTITIONER

## 2023-12-11 PROCEDURE — 71045 X-RAY EXAM CHEST 1 VIEW: CPT | Performed by: RADIOLOGY

## 2023-12-11 PROCEDURE — 2500000004 HC RX 250 GENERAL PHARMACY W/ HCPCS (ALT 636 FOR OP/ED)

## 2023-12-11 PROCEDURE — 2500000001 HC RX 250 WO HCPCS SELF ADMINISTERED DRUGS (ALT 637 FOR MEDICARE OP)

## 2023-12-11 PROCEDURE — 2500000001 HC RX 250 WO HCPCS SELF ADMINISTERED DRUGS (ALT 637 FOR MEDICARE OP): Performed by: EMERGENCY MEDICINE

## 2023-12-11 PROCEDURE — 99233 SBSQ HOSP IP/OBS HIGH 50: CPT | Performed by: PSYCHIATRY & NEUROLOGY

## 2023-12-11 PROCEDURE — 99231 SBSQ HOSP IP/OBS SF/LOW 25: CPT

## 2023-12-11 PROCEDURE — 85025 COMPLETE CBC W/AUTO DIFF WBC: CPT | Performed by: HOSPITALIST

## 2023-12-11 PROCEDURE — 71045 X-RAY EXAM CHEST 1 VIEW: CPT

## 2023-12-11 PROCEDURE — 99232 SBSQ HOSP IP/OBS MODERATE 35: CPT

## 2023-12-11 PROCEDURE — 97530 THERAPEUTIC ACTIVITIES: CPT | Mod: GP,CQ

## 2023-12-11 PROCEDURE — 85610 PROTHROMBIN TIME: CPT

## 2023-12-11 RX ORDER — ISOSORBIDE MONONITRATE 30 MG/1
30 TABLET, EXTENDED RELEASE ORAL DAILY
Qty: 30 TABLET | Refills: 0 | Status: SHIPPED | OUTPATIENT
Start: 2023-12-11 | End: 2024-01-09 | Stop reason: WASHOUT

## 2023-12-11 RX ORDER — POTASSIUM CHLORIDE 750 MG/1
10 TABLET, FILM COATED, EXTENDED RELEASE ORAL DAILY
Qty: 5 TABLET | Refills: 0 | Status: SHIPPED | OUTPATIENT
Start: 2023-12-11

## 2023-12-11 RX ORDER — LISINOPRIL 20 MG/1
20 TABLET ORAL DAILY
Qty: 30 TABLET | Refills: 0 | Status: SHIPPED | OUTPATIENT
Start: 2023-12-11 | End: 2024-01-09 | Stop reason: SDUPTHER

## 2023-12-11 RX ORDER — FUROSEMIDE 40 MG/1
20 TABLET ORAL DAILY
Status: DISCONTINUED | OUTPATIENT
Start: 2023-12-11 | End: 2023-12-11 | Stop reason: HOSPADM

## 2023-12-11 RX ORDER — POTASSIUM CHLORIDE 750 MG/1
10 TABLET, FILM COATED, EXTENDED RELEASE ORAL 2 TIMES DAILY
Status: DISCONTINUED | OUTPATIENT
Start: 2023-12-11 | End: 2023-12-11 | Stop reason: HOSPADM

## 2023-12-11 RX ORDER — IPRATROPIUM BROMIDE AND ALBUTEROL SULFATE 2.5; .5 MG/3ML; MG/3ML
3 SOLUTION RESPIRATORY (INHALATION) EVERY 4 HOURS PRN
Status: DISCONTINUED | OUTPATIENT
Start: 2023-12-11 | End: 2023-12-11 | Stop reason: HOSPADM

## 2023-12-11 RX ORDER — LIDOCAINE 560 MG/1
1 PATCH PERCUTANEOUS; TOPICAL; TRANSDERMAL DAILY
Qty: 7 PATCH | Refills: 0 | Status: SHIPPED | OUTPATIENT
Start: 2023-12-12 | End: 2024-01-09 | Stop reason: WASHOUT

## 2023-12-11 RX ORDER — FUROSEMIDE 20 MG/1
20 TABLET ORAL EVERY MORNING
Qty: 5 TABLET | Refills: 0 | Status: SHIPPED | OUTPATIENT
Start: 2023-12-11 | End: 2024-01-09 | Stop reason: WASHOUT

## 2023-12-11 RX ORDER — GABAPENTIN 300 MG/1
300 CAPSULE ORAL EVERY 8 HOURS SCHEDULED
Qty: 90 CAPSULE | Refills: 0 | Status: SHIPPED | OUTPATIENT
Start: 2023-12-11

## 2023-12-11 RX ORDER — METOPROLOL TARTRATE 50 MG/1
50 TABLET ORAL 2 TIMES DAILY
Qty: 60 TABLET | Refills: 0 | Status: SHIPPED | OUTPATIENT
Start: 2023-12-11 | End: 2024-01-09 | Stop reason: WASHOUT

## 2023-12-11 RX ORDER — CLOPIDOGREL BISULFATE 75 MG/1
75 TABLET ORAL DAILY
Qty: 90 TABLET | Refills: 0 | Status: SHIPPED | OUTPATIENT
Start: 2023-12-12 | End: 2024-01-09 | Stop reason: SDUPTHER

## 2023-12-11 RX ADMIN — FUROSEMIDE 20 MG: 40 TABLET ORAL at 12:48

## 2023-12-11 RX ADMIN — LIDOCAINE 1 PATCH: 4 PATCH TOPICAL at 09:05

## 2023-12-11 RX ADMIN — POTASSIUM CHLORIDE 20 MEQ: 1500 TABLET, EXTENDED RELEASE ORAL at 06:37

## 2023-12-11 RX ADMIN — CLOPIDOGREL BISULFATE 75 MG: 75 TABLET, FILM COATED ORAL at 09:05

## 2023-12-11 RX ADMIN — HEPARIN SODIUM 5000 UNITS: 5000 INJECTION INTRAVENOUS; SUBCUTANEOUS at 04:11

## 2023-12-11 RX ADMIN — GABAPENTIN 300 MG: 300 CAPSULE ORAL at 09:05

## 2023-12-11 RX ADMIN — ISOSORBIDE MONONITRATE 30 MG: 30 TABLET, EXTENDED RELEASE ORAL at 09:05

## 2023-12-11 RX ADMIN — LISINOPRIL 20 MG: 20 TABLET ORAL at 09:05

## 2023-12-11 RX ADMIN — PANTOPRAZOLE SODIUM 40 MG: 40 TABLET, DELAYED RELEASE ORAL at 06:37

## 2023-12-11 RX ADMIN — GABAPENTIN 300 MG: 300 CAPSULE ORAL at 01:00

## 2023-12-11 RX ADMIN — DOCUSATE SODIUM 100 MG: 100 CAPSULE, LIQUID FILLED ORAL at 09:05

## 2023-12-11 RX ADMIN — METOPROLOL TARTRATE 50 MG: 50 TABLET, FILM COATED ORAL at 09:05

## 2023-12-11 RX ADMIN — METOCLOPRAMIDE HYDROCHLORIDE 10 MG: 5 INJECTION INTRAMUSCULAR; INTRAVENOUS at 09:05

## 2023-12-11 RX ADMIN — LEVOTHYROXINE SODIUM 88 MCG: 0.09 TABLET ORAL at 06:37

## 2023-12-11 RX ADMIN — ASPIRIN 81 MG: 81 TABLET, CHEWABLE ORAL at 09:05

## 2023-12-11 RX ADMIN — POTASSIUM CHLORIDE 10 MEQ: 750 TABLET, EXTENDED RELEASE ORAL at 12:49

## 2023-12-11 RX ADMIN — HEPARIN SODIUM 5000 UNITS: 5000 INJECTION INTRAVENOUS; SUBCUTANEOUS at 12:49

## 2023-12-11 ASSESSMENT — COGNITIVE AND FUNCTIONAL STATUS - GENERAL
MOVING TO AND FROM BED TO CHAIR: A LITTLE
STANDING UP FROM CHAIR USING ARMS: A LITTLE
WALKING IN HOSPITAL ROOM: A LITTLE
CLIMB 3 TO 5 STEPS WITH RAILING: A LOT
MOBILITY SCORE: 17
TURNING FROM BACK TO SIDE WHILE IN FLAT BAD: A LITTLE
MOVING FROM LYING ON BACK TO SITTING ON SIDE OF FLAT BED WITH BEDRAILS: A LITTLE

## 2023-12-11 ASSESSMENT — PAIN - FUNCTIONAL ASSESSMENT: PAIN_FUNCTIONAL_ASSESSMENT: 0-10

## 2023-12-11 NOTE — PROGRESS NOTES
"Physical Therapy    Physical Therapy Treatment    Patient Name: Liban Jaramillo  MRN: [id number]  Today's Date: 12/11/2023  Time Calculation  Start Time: 1036  Stop Time: 1102  Time Calculation (min): 26 min       Assessment/Plan   End of Session Patient Position:  (Patient returned to bed after treatment.  Chest pillow for bracing, call light and tray in reach.)    PT Plan  Inpatient/Swing Bed or Outpatient: Inpatient  Treatment/Interventions: Transfer training, Gait training, Therapeutic exercise  PT Plan: Skilled PT  PT Frequency: 4 times per week  PT Discharge Recommendations: Low intensity level of continued care         General Visit Information:   PT  Visit  PT Received On: 12/11/23    General  Prior to Session Communication:  (Cleared by RN for PT.  Nurse reports patient is doing well today and has already amb once in the hallway.)  Patient Position Received:  (Patient presents in bed, arms behind his head.  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  I'm a fast healer\"    Precautions:  Hearing/Visual Limitations:  Patient continues to c/o double vision.  He has been wearing a patch to assist in correcting.  Medical Precautions: Fall precautions  Post-Surgical Precautions: Move in the Tube      Vital Signs:  Vital Signs  SpO2:  95% after ambulation on room air.      Pain:  Pain Assessment: 0-10  Pain Score:  Patient rates pain 2-3/10 in his \"xiphoid process\".     Cognition:  Overall Cognitive Status:  Patient demos poor awarness and poor follow through of MITT precautions.  Dismissive of education.         Bed Mobility  Bed Mobility:  supine-->sit: SBA  Instructed in log roll technique. Bed flat to mimic home set up.  No rail utilized.  Patient demos good technique.    sit-->supine: SBA    Ambulation/Gait Training  Ambulation/Gait Training Performed:  Patient amb 200' " "without AD and with SBAx1.  Fair sigrid, step through gait.  No LOB occured. Some SOB after amb.  Sp02=95%.  He could not recall if he amb earlier with nursing or not.  RN stated he did amb.     Transfers  Transfer:  sit<-->stand: SBAx1      Outcome Measures:  [facility] Basic Mobility  Turning from your back to your side while in a flat bed without using bedrails: A little  Moving from lying on your back to sitting on the side of a flat bed without using bedrails: A little  Moving to and from bed to chair (including a wheelchair): A little  Standing up from a chair using your arms (e.g. wheelchair or bedside chair): A little  To walk in hospital room: A little  Climbing 3-5 steps with railing: A lot  Basic Mobility - Total Score: 17    Education Documentation  Precautions, taught by Josselyn Mark PTA at 12/11/2023  1:02 PM.  Learner: Patient  Readiness: Acceptance  Method: Explanation, Demonstration, Handout  Response: Needs Reinforcement    Mobility Training, taught by Josselyn Mark PTA at 12/11/2023  1:02 PM.  Learner: Patient  Readiness: Acceptance  Method: Explanation, Demonstration, Handout  Response: Needs Reinforcement    EDUCATION:  Education Provided:  Patient unable to recall MITT precuations and became agitated when PTA spoke of the \"tube\".  \"I don't have no tubes in me!\"  Patient educated in MITT precautions and was provided with written MITT handout.  Despite education, observed him still reaching overhead and outside of the \"tube\".  Very dismissive of education.  States \"No one believes me, but I'm a fast healer\"    Encounter Problems       Encounter Problems (Active)       Impaired mobility s/p CABG        Perform all bed mobility with indep.  (Progressing)       Start:  12/08/23    Expected End:  12/22/23            Perform all transfers with ww vs LRAD and mod. indep.  (Progressing)       Start:  12/08/23    Expected End:  12/22/23            Patient will ambulate >/= 100 ft. with ww vs LRAD and mod. " indep.  (Progressing)       Start:  12/08/23    Expected End:  12/22/23            Patient will ascend/descend 12 steps with cane or crutch and supervision  (Not Progressing)       Start:  12/08/23    Expected End:  12/22/23

## 2023-12-11 NOTE — PROGRESS NOTES
[facility]       Date:  12/11/2023  Patient:  Liban Jaramillo  YOB: 1965  MRN:  [id number]   Admit Date:  12/7/2023  ========================================================================================================    S/P On Pump CABG x 3         History of Present Illness:  Liban Jaramillo is a 58 y.o. year old male patient with Past Medical History of hypertension, hyperlipidemia, hypothyroidism, tobacco use and CAD.  He was referred by cardiology for coronary bypass grafting. He initially presented to ED on 10/21/23 endorsing intermittent mid sternal chest pain unrelieved by nitroglycerin. He underwent cardiac catheterization that showed 90% stenosis in LAD, 95% stenosis in left circumflex as well as 50% stenosis in left main. Right coronary artery was without disease and LVEF was normal at 60%.  On 12/7, patient underwent on pump x 3 CABG, LIMA-LAD, RA-OM, SVG-PDA.           Interval ICU Events:  On 12/7, patient underwent on pump x 3 CABG, LIMA-LAD, RA-OM, SVG-PDA Cross clamp time was 57 min, on pump for 69 min. He returned to ICU in stable postoperative condition with Ventricular wires, 1 mediastinal chest tube, 1 R pleural chest tube and 1 left pleural chest tube, intubated and sedation on propofol. On arrival to ICU, pt was given 200 mcg of push dose epi for hypotension with great response.     12/8: POD #1CABG x 3. Patient is afebrile and hemodynamically stable (nicardipine was discontinued early this am) HR is NSR with rates in the 80s with no ectopy. He is 93% SP02 on HFNC. Pt was endorsing productive cough that is difficult to get up, given mucomyst this am. He states that pain is well controlled however he is insistent that Montes and chest tubes come out. He was told after ambulating today - okay to remove chest tubes and montes.     12/9: POD # 2. Pt began having L eye double vision last night with strabismus and CN3 palsy. 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 Neuro was consulted and they are recommending plavix at this time. Other wise patient remained hemodynamically stable, afebrile, and NSR with no ectopy. He was on 4L NC with SPO2 of 95%- however is refusing to wear Nasal Canula at this time and otherwise wanted to leave AMA, I discussed with him at length of importance of staying in the hospital post op and he reluctantly agreed.     12/10: POD #3. Patient extremely agitated overnight-security called. Patient was unhappy with his NPO status and being helped to the bathroom. He was de escalated- allowed for sips and chips. He remained afebrile and hemodynamically stable, HR NSR with no ectopy. States he is passing gas but has not had a bowel movement. States pain has improved.    12/11: POD #4. No complaints overnight, denies pain, SOB currently but does notice SOB when ambulating. Had large BM yesterday per patient and is tolerating a diet. 95% on RA, HDS, able to ambulate independently in room uses walker in hallways.     Medical History:  Past Medical History:   Diagnosis Date    Anxiety     Arthritis     Cervical disc disease     COPD (chronic obstructive pulmonary disease) (CMS/HCC)     Coronary artery disease     Diabetes mellitus (CMS/HCC)     \"diet controlled\" and is not testing his glucose    Disease of thyroid gland     GERD (gastroesophageal reflux disease)     Hyperlipidemia     Hypertension     Myocardial infarction (CMS/HCC)     Nephrolithiasis     PTSD (post-traumatic stress disorder)     Skin disorder     states rash on occassion    Wears glasses     [place] teeth extracted      Past Surgical History:   Procedure Laterality Date    CARDIAC CATHETERIZATION      HERNIA REPAIR      TONSILLECTOMY      WISDOM TOOTH EXTRACTION       Medications Prior to Admission   Medication Sig Dispense Refill Last Dose    aspirin 81 mg EC tablet Take 1 tablet (81 mg) by mouth once " daily in the morning. Take before meals. 90 tablet 1 Past Week    atorvastatin (Lipitor) 80 mg tablet Take 1 tablet (80 mg) by mouth once daily. 90 tablet 1 Past Week    isosorbide mononitrate ER (Imdur) 30 mg 24 hr tablet Take 1 tablet (30 mg) by mouth once daily. 90 tablet 1 Past Week    levothyroxine (Synthroid, Levoxyl) 88 mcg tablet Take 1 tablet (88 mcg) by mouth once daily. 60 tablet 0 Past Week    lisinopril 20 mg tablet Take 1 tablet (20 mg) by mouth once daily. 90 tablet 1 Past Week    metoprolol tartrate (Lopressor) 50 mg tablet Take 1 tablet by mouth 2 times a day. (Patient taking differently: Take 1 tablet by mouth once daily. 12/4/23 patient states he takes 50 mg daily in the morning) 180 tablet 1 Past Week    mupirocin (Bactroban) 2 % ointment As directed   12/7/2023 at 0600    nitroglycerin (Nitrostat) 0.4 mg SL tablet Place 1 tablet (0.4 mg) under the tongue every 5 minutes if needed for chest pain. 90 tablet 1 Past Week    ranolazine (Ranexa) 500 mg 12 hr tablet Take 1 tablet (500 mg) by mouth 2 times a day. 180 tablet 1 Past Week    Advair HFA 45-21 mcg/actuation inhaler Inhale 2 puffs 2 times a day as needed. States he uses it PRN   More than a month    blood sugar diagnostic strip 1 each once daily. 100 strip 3 Unknown    FreeStyle glucose monitoring kit 1 each 3 times a day as needed (for low blood sugar symptoms). 1 kit 0 Unknown    lancets misc 1 each once daily. 100 each 3 Unknown at f     Penicillins  Social History     Tobacco Use    Smoking status: Every Day     Packs/day: 0.50     Years: 46.00     Additional pack years: 0.00     Total pack years: 23.00     Types: Cigarettes    Smokeless tobacco: Never   Vaping Use    Vaping Use: Never used   Substance Use Topics    Alcohol use: Yes     Alcohol/week: 3.0 standard drinks of alcohol     Types: 3 Cans of beer per week    Drug use: Not Currently     Comment: before everything     Family History   Problem Relation Name Age of Onset    Diabetes  Mother      Cancer Mother      Diabetes Father      Liver disease Father      Esophageal cancer Brother      Throat cancer [facility] Grandfather         [facility] Medications:        Current Facility-Administered Medications:     acetaminophen (Tylenol) tablet 650 mg, 650 mg, oral, q4h PRN **OR** [DISCONTINUED] acetaminophen (Tylenol) oral liquid 650 mg, 650 mg, nasogastric tube, q4h PRN **OR** [DISCONTINUED] acetaminophen (Tylenol) suppository 650 mg, 650 mg, rectal, q4h PRN, KENNEDY Abreu-CNP    acetylcysteine (Mucomyst) 200 mg/mL (20 %) nebulizer solution 600 mg, 3 mL, nebulization, TID, Waldo Price, DO, 600 mg at 12/10/23 2031    aspirin chewable tablet 81 mg, 81 mg, oral, Daily, Waldo Price, DO, 81 mg at 12/10/23 1030    atorvastatin (Lipitor) tablet 80 mg, 80 mg, oral, Nightly, KENNEDY Abreu-CNP, 80 mg at 12/10/23 2111    bisacodyl (Dulcolax) EC tablet 10 mg, 10 mg, oral, Daily PRN, KENNEDY Abreu-CNP, 10 mg at 12/10/23 1036    clopidogrel (Plavix) tablet 75 mg, 75 mg, oral, Daily, KENNEDY Abreu-CNP, 75 mg at 12/10/23 1029    dextrose 50 % injection 25 g, 25 g, intravenous, q15 min PRN **OR** glucagon (Glucagen) injection 1 mg, 1 mg, intramuscular, q15 min PRN, TAMEKA Zelaya    docusate sodium (Colace) capsule 100 mg, 100 mg, oral, BID, KENNEDY Abreu-CNP, 100 mg at 12/10/23 2111    fluticasone (Flonase) nasal spray 2 spray, 2 spray, Each Nostril, Daily, KENNEDY Abreu-CNP, 2 spray at 12/10/23 1028    gabapentin (Neurontin) capsule 300 mg, 300 mg, oral, q8h BRANDON, KENNEDY Abreu-CNP, 300 mg at 12/11/23 0100    guaiFENesin (Mucinex) 12 hr tablet 600 mg, 600 mg, oral, BID PRN, Danielle Terrell, APRN-CNP, 600 mg at 12/10/23 1036    heparin (porcine) injection 5,000 Units, 5,000 Units, subcutaneous, q8h, Neetu Callaway APRN-CNP, 5,000 Units at 12/11/23 0411    ipratropium-albuteroL (Duo-Neb) 0.5-2.5 mg/3 mL nebulizer solution 3  mL, 3 mL, nebulization, TID, KENNEDY Abreu-CNP, 3 mL at 12/10/23 2031    isosorbide mononitrate ER (Imdur) 24 hr tablet 30 mg, 30 mg, oral, Daily, Waldo Price DO, 30 mg at 12/10/23 1030    labetaloL (Normodyne,Trandate) injection 10 mg, 10 mg, intravenous, q6h PRN, TAMEKA Julian, 10 mg at 12/08/23 2335    levothyroxine (Synthroid, Levoxyl) tablet 88 mcg, 88 mcg, oral, Daily, TAMEKA Abreu, 88 mcg at 12/11/23 0637    lidocaine 4 % patch 1 patch, 1 patch, transdermal, Daily, Waldo Price DO, 1 patch at 12/10/23 1022    lisinopril tablet 20 mg, 20 mg, oral, Daily, TAMEKA Julian, 20 mg at 12/10/23 1027    magnesium sulfate IV 2 g, 2 g, intravenous, q6h PRN, KENNEDY Abreu-CNP, Stopped at 12/09/23 0755    magnesium sulfate IV 4 g, 4 g, intravenous, q6h PRN, KENNEDY Abreu-CNP, Stopped at 12/10/23 0904    metoclopramide (Reglan) injection 10 mg, 10 mg, intravenous, TID, TAMEKA Abreu, 10 mg at 12/10/23 2110    metoprolol tartrate (Lopressor) tablet 50 mg, 50 mg, oral, BID, Waldo Price DO, 50 mg at 12/10/23 2111    nicotine (Nicoderm CQ) 14 mg/24 hr patch 1 patch, 1 patch, transdermal, Daily, TAMEKA Abreu, 1 patch at 12/10/23 1022    ondansetron (Zofran) tablet 4 mg, 4 mg, oral, q8h PRN **OR** ondansetron (Zofran) injection 4 mg, 4 mg, intravenous, q8h PRN, TAMEKA Abreu    oxygen (O2) therapy, , inhalation, Continuous PRN - O2/gases, KENNEDY Zelaya-CNP    pantoprazole (ProtoNix) EC tablet 40 mg, 40 mg, oral, Daily before breakfast, 40 mg at 12/11/23 0637 **OR** [DISCONTINUED] pantoprazole (ProtoNix) injection 40 mg, 40 mg, intravenous, Daily before breakfast, TAMEKA Abreu, 40 mg at 12/10/23 0504    polyethylene glycol (Glycolax, Miralax) packet 17 g, 17 g, oral, Daily, TAMEKA Abreu, 17 g at 12/10/23 1026    potassium chloride CR (Klor-Con M20) ER tablet 20 mEq,  20 mEq, oral, q6h PRN, 20 mEq at 12/11/23 0637 **OR** potassium chloride (Klor-Con) packet 20 mEq, 20 mEq, oral, q6h PRN, Neetu R Cesia, APRN-CNP    potassium chloride CR (Klor-Con M20) ER tablet 40 mEq, 40 mEq, oral, q6h PRN **OR** potassium chloride (Klor-Con) packet 40 mEq, 40 mEq, oral, q6h PRN, Neetu R Callaway, APRN-CNP    potassium chloride 20 mEq in 100 mL IV premix, 20 mEq, intravenous, q6h PRN, Neetu R Callaway, APRN-CNP, Stopped at 12/10/23 0704    potassium chloride 40 mEq in 100 mL IV premix, 40 mEq, intravenous, q6h PRN, Neetu R Callaway, APRN-CNP    traMADol (Ultram) tablet 50 mg, 50 mg, oral, q6h PRN, Neetu R Callaway, APRN-CNP    Review of Systems:  14 point review of systems was completed and negative except for those specially mention in my HPI    Physical Exam:    Heart Rate:  [70-98]   Temp:  [36.4 °C (97.5 °F)-37.1 °C (98.8 °F)]   Resp:  [15-37]   BP: ()/(46-78)   Weight:  [100 kg (220 lb 7.4 oz)]   SpO2:  [89 %-97 %]     Physical Exam  Vitals and nursing note reviewed.   Constitutional:       Appearance: Normal appearance. He is well-developed.      Interventions: Nasal cannula in place.   HENT:      Head: Normocephalic and atraumatic.   Eyes:      General: Visual field deficit present.      Extraocular Movements:      Left eye: Abnormal extraocular motion present.      Comments: L eye palsy    Cardiovascular:      Rate and Rhythm: Normal rate and regular rhythm.      Pulses: Normal pulses.      Heart sounds: Normal heart sounds.   Pulmonary:      Effort: Pulmonary effort is normal.      Breath sounds: Normal breath sounds.   Chest:      Comments: Midline incision with no erythema or drainage   Abdominal:      General: Abdomen is flat. Bowel sounds are decreased.      Palpations: Abdomen is soft.   Musculoskeletal:      Cervical back: Full passive range of motion without pain and normal range of motion.      Right lower leg: No edema.      Left lower leg: No edema.   Skin:      General: Skin is cool.      Capillary Refill: Capillary refill takes less than 2 seconds.      Comments: Radial and Saphenous harvesting site CDI, no erythema or drainage noted    Neurological:      General: No focal deficit present.      Mental Status: He is alert and oriented to person, place, and time.      Motor: Motor function is intact.      Coordination: Coordination is intact.      Gait: Gait is intact.   Psychiatric:         Attention and Perception: Perception normal.         Speech: Speech normal.         Behavior: Behavior is agitated.       Objective:    Results for orders placed or performed during the hospital encounter of 12/07/23 (from the past 24 hour(s))   ECG 12 Lead   Result Value Ref Range    Ventricular Rate 73 BPM    Atrial Rate 73 BPM    MS Interval 146 ms    QRS Duration 86 ms    QT Interval 386 ms    QTC Calculation(Bazett) 425 ms    P Axis 47 degrees    R Axis 10 degrees    T Axis 52 degrees    QRS Count 12 beats    Q Onset 226 ms    P Onset 153 ms    P Offset 206 ms    T Offset 419 ms    QTC Fredericia 412 ms   Magnesium   Result Value Ref Range    Magnesium 2.00 1.60 - 2.40 mg/dL   Renal Function Panel   Result Value Ref Range    Glucose 122 (H) 74 - 99 mg/dL    Sodium 136 136 - 145 mmol/L    Potassium 3.9 3.5 - 5.3 mmol/L    Chloride 106 98 - 107 mmol/L    Bicarbonate 22 21 - 32 mmol/L    Anion Gap 12 10 - 20 mmol/L    Urea Nitrogen 10 6 - 23 mg/dL    Creatinine 0.77 0.50 - 1.30 mg/dL    eGFR >90 >60 mL/min/1.73m*2    Calcium 8.2 (L) 8.6 - 10.3 mg/dL    Phosphorus 2.6 2.5 - 4.9 mg/dL    Albumin 3.3 (L) 3.4 - 5.0 g/dL   Coagulation Screen   Result Value Ref Range    Protime 12.3 9.8 - 12.8 seconds    INR 1.1 0.9 - 1.1    aPTT 31 27 - 38 seconds   CBC and Auto Differential   Result Value Ref Range    WBC 9.2 4.4 - 11.3 x10*3/uL    nRBC 0.0 0.0 - 0.0 /100 WBCs    RBC 3.90 (L) 4.50 - 5.90 x10*6/uL    Hemoglobin 12.0 (L) 13.5 - 17.5 g/dL    Hematocrit 35.5 (L) 41.0 - 52.0 %    MCV 91 80 -  100 fL    MCH 30.8 26.0 - 34.0 pg    MCHC 33.8 32.0 - 36.0 g/dL    RDW 13.7 11.5 - 14.5 %    Platelets 192 150 - 450 x10*3/uL    Neutrophils % 63.2 40.0 - 80.0 %    Immature Granulocytes %, Automated 0.5 0.0 - 0.9 %    Lymphocytes % 18.2 13.0 - 44.0 %    Monocytes % 13.2 2.0 - 10.0 %    Eosinophils % 4.7 0.0 - 6.0 %    Basophils % 0.2 0.0 - 2.0 %    Neutrophils Absolute 5.79 1.20 - 7.70 x10*3/uL    Immature Granulocytes Absolute, Automated 0.05 0.00 - 0.70 x10*3/uL    Lymphocytes Absolute 1.67 1.20 - 4.80 x10*3/uL    Monocytes Absolute 1.21 (H) 0.10 - 1.00 x10*3/uL    Eosinophils Absolute 0.43 0.00 - 0.70 x10*3/uL    Basophils Absolute 0.02 0.00 - 0.10 x10*3/uL      ECG 12 Lead    Result Date: 12/8/2023  Normal sinus rhythm Inferior infarct , age undetermined Possible Anterior infarct , age undetermined Abnormal ECG When compared with ECG of 07-DEC-2023 13:42, Borderline criteria for Anterior infarct are now Present Inferior infarct is now Present    XR chest 1 view    Result Date: 12/8/2023  Interpreted By:  Dacia Wolf, STUDY: XR CHEST 1 VIEW;  12/8/2023 12:48 am   INDICATION: Signs/Symptoms:Hypoxia.   COMPARISON: Chest x-ray 12/07/2023   ACCESSION NUMBER(S): [id number]   ORDERING CLINICIAN: DIETER GOMEZ   FINDINGS: Interval removal of endotracheal and enteric tubes. Right IJ [place] catheter terminates in the right pulmonary artery. Mediastinal drain and bilateral chest tubes are stable in position. Midline sternotomy wires and epicardial leads noted. Overlying leads are also present.   CARDIOMEDIASTINAL SILHOUETTE: Cardiac silhouette is enlarged but stable.   LUNGS: Bandlike opacities in the right lung may relate to atelectasis. There is retrocardiac airspace opacity with blunting of the costophrenic angle which may relate to small effusion and atelectasis. No significant interval change. No definite pneumothorax.   ABDOMEN: No remarkable upper abdominal findings.   BONES: No acute osseous abnormality.        Bibasilar opacities may relate to small effusion and atelectasis. Superimposed infection not excluded. Attention on continued follow-up is advised.   Support hardware as described above.   MACRO: None   Signed by: Dacia Wolf 12/8/2023 1:22 AM Dictation workstation:   [id number]    ECG 12 Lead    Result Date: 12/7/2023  Normal sinus rhythm Normal ECG When compared with ECG of 06-DEC-2023 12:56, (unconfirmed) No significant change was found Confirmed by Mario Lennon ([id number]) on 12/7/2023 4:13:49 PM    ECG 12 lead (Clinic Performed)    Result Date: 12/7/2023  Normal sinus rhythm Inferior infarct (Cannot exclude) Abnormal ECG No previous ECGs available Confirmed by Mario Lennon ([id number]) on 12/7/2023 4:04:48 PM    XR chest 1 view    Result Date: 12/7/2023  Interpreted By:  Waldo Carmichael, STUDY: XR CHEST 1 VIEW;  12/7/2023 2:23 pm   INDICATION: Signs/Symptoms:Post op cardiac surgery.   COMPARISON: 12/07/2023 at 8:55 a.m..   ACCESSION NUMBER(S): [id number]   ORDERING CLINICIAN: BREANNE YAÑEZ   FINDINGS: CARDIOMEDIASTINAL SILHOUETTE: Endotracheal tube is now seen with tip projecting approximately 4.5 cm above the inna level. NG tube is now seen extending to the stomach level with tip not visualized. [place] catheter from right jugular approach remains in place with tubing tip again projecting at the right main pulmonary artery level. There have been interval postoperative changes of the mediastinum with new median sternotomy wires and scattered surgical clips. Borderline size of the cardiac silhouette and mild aortic prominence is again seen, partially exaggerated by lower inspiratory volume. There is also a new cephalad directed midline probable mediastinal drain.   LUNGS: Bilateral cephalad directed chest tubes are now seen. Inspiratory volume is low. Elevation of the right hemidiaphragm again seen. New irregular regions of atelectasis and/or small infiltrates are seen in the perihilar regions  bilaterally and left base. Small effusions not excluded. No appreciable pneumothorax.   ABDOMEN: No remarkable upper abdominal findings.   BONES: Bones are stable.       1.  Interval postoperative changes of the mediastinum with new life-support lines and tubes as detailed. 2. Low inspiratory volume with new irregular regions of atelectasis and or infiltrates in the perihilar regions bilaterally and left base.       MACRO: None.   Signed by: Waldo Carmichael 12/7/2023 3:09 PM Dictation workstation:   [id number]    XR chest 1 view    Result Date: 12/7/2023  Interpreted By:  Waldo Carmichael, STUDY: XR CHEST 1 VIEW;  12/7/2023 8:55 am   INDICATION: Signs/Symptoms:line placement.   COMPARISON: None.   ACCESSION NUMBER(S): [id number]   ORDERING CLINICIAN: NATHALIA TOLLIVER   FINDINGS: CARDIOMEDIASTINAL SILHOUETTE: There is a [place] catheter from right jugular approach with tubing tip projecting near the right hilum at the right main pulmonary artery level. Cardiac silhouette is not significantly enlarged.   LUNGS: There is elevation of the right hemidiaphragm. Mild left basilar atelectasis is seen. No definite pleural effusion. No pneumothorax is seen.   ABDOMEN: No remarkable upper abdominal findings.   BONES: Multilevel endplate spurring present in the spine. Degenerative changes of the shoulders are partially visualized.       1.  [place] catheter from right jugular approach has tubing tip projecting at the right main pulmonary artery level. 2. Mild left basilar atelectasis.       MACRO: None.   Signed by: Waldo Carmichael 12/7/2023 3:07 PM Dictation workstation:   [id number]    CT chest wo IV contrast    Result Date: 12/6/2023  Interpreted By:  Schoenberger, Joseph, STUDY: CT CHEST WO IV CONTRAST;  12/6/2023 11:35 am   INDICATION: Signs/Symptoms:pre-operative cabg.   COMPARISON: None.   ACCESSION NUMBER(S): [id number]   ORDERING CLINICIAN: DENICE CARTY   TECHNIQUE: Helical data acquisition of the chest  was obtained  without IV contrast material.  Images were reformatted in axial, coronal, and sagittal planes.   FINDINGS: LUNGS AND AIRWAYS: The trachea and central airways are patent. No endobronchial lesion.   There is a subpleural calcified nodule in the left lower lobe consistent with prior granulomatous disease. Measures less than 5 mm. There is no pleural effusion, pneumothorax, or airspace opacity. No other significant focal lung opacities are noted.   MEDIASTINUM AND NASRIN, LOWER NECK AND AXILLA: The visualized thyroid gland is within normal limits.   No evidence of thoracic lymphadenopathy by CT criteria.   Esophagus appears normal   HEART AND VESSELS: The the thoracic aorta is normal in course and caliber. The caliber of the ascending aorta approximates 3.5 cm. There are calcifications on the aortic valve. No calcifications of the ascending aorta. Mild calcifications in the arch and descending aorta.   Main pulmonary artery and its branches are normal in caliber.   There are severe coronary atherosclerotic calcification the study is not optimized for evaluation of coronary arteries.   The cardiac chambers are not enlarged.   No evidence of pericardial effusion.   UPPER ABDOMEN: The visualized subdiaphragmatic structures demonstrate no remarkable findings.   CHEST WALL AND OSSEOUS STRUCTURES: There are no suspicious osseous lesions. Multilevel degenerative changes are present       1.  No significant ascending aortic calcifications. See discussion above   MACRO: None   Signed by: Joseph Schoenberger 12/6/2023 12:37 PM Dictation workstation:   [id number]      FiO2 (%):  [21 %] 21 %      Intake/Output Summary (Last 24 hours) at 12/11/2023 0723  Last data filed at 12/11/2023 0600  Gross per 24 hour   Intake 1489.02 ml   Output 2900 ml   Net -1410.98 ml           Assessment/Plan:    I am currently managing this critically ill patient for the following problems:    Post Op Pain  Acute vs Subacute infarct of Lesa    Hx of Nicotine abuse  Hx of former IV drug use  Hx of HTN  Triple Vessel CAD s/p CABG x 3  Hyperglycemia   Post Op Respiratory Insufficiency     Neuro/Psych/Pain Ctrl/Sedation:  Post-Op Pain  Former IV drug use  Hx of Nicotine abuse   Acute vs Subacute Infarct of Lesa   MRI did show subtle 5 mm focus of diffusion restriction consistent with acute to early subacute infarct is present in the dorsal tegmentum of the lesa, of the right of midline, immediately anterior to the 4th ventricle near the expected location of the right medial longitudinal fasciculus and right nucleus of cranial nerve 6. There is slight local mass effect without evidence of hemorrhagic transformation.    -Incisional pain  -Discontinue narcotics, pain control with tylenol and Toradol   -Neuro consulted for infarct, recommending plavix- okay to start per CTS   -Neurochecks per ICU protocol   -Nicotine patch    Respiratory/ENT:  Post-Op Respiratory Insufficiency- resolved   -Hx of PPD smoking, no documented pulmonary insufficiency   -Patient was extubated on 12/7 to NC  -Given 20 mg IV Lasix for diuresis yesterday   -Wean supplemental oxygen for spO2 goal > 92%  -TID duonebs, IS  -Mucinex Daily     Cardiovascular:  Triple Vessel CAD s/p CABG x 3  Patient underwent on pump CABG x3 LIMA-LAD, RA-OM, SVG-PDA.,   -swan and montes removed 12/8, V wires cut 12/8 in prep for MRI 2 pleural, 1 mediastinal chest tube removed 12/8  -Daily BMP, keep K >4, Mg > 2  -Continue ASA and statin   - Home BB resumed 50mg BID on 12/8, imdur, lisinopril  -CTS following and appreciate further management     GI:  Possible post op ileus resolved 12/10  PPI for GI prophylaxis  Bowel regimen- PRN   IV Reglan for GI motility   Tolerating cardiac diet    Renal/Volume Status (Intra & Extravascular):  Daily CMP  Keep MG >2 and K >4  Replace PRN per protocol   Hourly Is and Os    Endocrine  Hyperglycemia 122  Hypothroidism  -No history of DM, likely stress induced  -Strict blood  "glucose control post-operatively  -Glucose goal   -Resume home synthroid    Infectious Disease:  Leukocytosis reactive, resolved  Trend CBC  Monitor for sx of SIRS    Heme/Onc:  Acute Blood Loss Anemia Post-Op resolved  -Daily CBC  -Transfuse for Hgb goal < 7 or massive blood loss with hemodynamic instability     OBGYN/MSK:  PT and OT POD # 1  OOB as tolerated     Ethics/Code Status:  Full Code     [facility] Cardiothoracic Surgery/ICU Quality Check      New Onset Organ Failure (ATA, Shock, ALI etc): No  >2 Vasopressors/Inotropes (Levophed > 0.1mcg/kg/min + Vasopressin) for more than 8 hours: No  Ongoing Blood Product Transfusion: No  Sepsis/New Infection: No  Delirium: no  Readmission to ICU: No  Family/Patient Concerns: concerned for rehab for eye sight following stroke. Patient was given eye patch for time being.     If any \"yes\" to the above, action plan as noted below: Neurology following for infarct - Rounds completed in conjunction with CTS NP     :  DVT Prophylaxis: lovenox   GI Prophylaxis: PPI  Bowel Regimen: miralax, ducalox, suppository PRN  Diet: Cardiac  CVC: Cordis removed 12/10  Ferguson: no  Diggs: no  Restraints: none  Dispo: Transfer vs discharge to home per CTS      Haley Johnson APRN, CNP  Critical Care Medicine   [facility]    "

## 2023-12-11 NOTE — DISCHARGE SUMMARY
Discharge Diagnosis  Coronary artery disease involving native coronary artery of native heart with unstable angina pectoris (CMS/HCC)    Issues Requiring Follow-Up  Blurry vision 2/2 cerebral  infarct; Left eye palsy     Test Results Pending At Discharge  Pending Labs       No current pending labs.            Hospital Course  Liban Celeste is a 58 y.o. male with past medical history of hypertension, hyperlipidemia, hypothyroidism, substance abuse, smoker, cervical stenosis and radiculopathy and left rotator cuff injury. Over the last several months he has noted increasing symptoms of chest pain and dyspnea on exertion. He was evaluated at [facility] on 10/23/2023 for symptoms of recurrent chest pain.  At that time laboratory studies were unremarkable except for his elevated TSH. He was then seen by Dr. Rodriguez and was scheduled for cardiac catheterization. In the interim he did have an episode of recurrent angina and required evaluation at [facility] emergency room where he was seen by Dr. Casillas who performed cardiac catheterization there. Cardiac cath showed 90% LAD stenosis and 95% left circumflex stenosis as well as 50% left main, right coronary was without significant disease and left ventricular ejection fraction at that time was normal at 60%. Dr. Casillas felt that his findings warranted bypass surgery and he was referred to cardiovascular surgery at [facility].      On 12/7, patient underwent elective CABG x 3; LIMA-LAD, RA-OM, SVG-PDA. Cross clamp time was 57 min, on pump for 69 min. He returned to ICU in stable postoperative condition with Ventricular wires, 1 mediastinal chest tube, 1 R pleural chest tube and 1 left pleural chest tube, intubated and sedated on propofol.     The post operative period was complicated by CVA and ileus.     CVA: Around 1700 POD#1 pt reported blurry/double vision upon returning from a walk around unit. Intensivist notified immediately and neuro exam performed  noting left eye 6th nerve palsy. Double vision resolved with left eye being covered. No facial droop. 5/5 motor strength to all extremities. CVTS notified who agreed with clipping temporary epicardial wires and obtaining MRI. MRI revealed subtle 5mm focus of diffusion restriction c/w acute to early subacute infarct present in the dorsal tegmentum of the david, of the right of midline, immediately anterior to the 4th ventricle near the expected location of the right medial longitudinal fasciculus and right nucleus of cranial nerve 6. Slight local mass effect without evidence of hemorrhagic conversion. MRI of orbits showed no acute intraorbital pathology. Neurology consulted and pt started on Plavix. At time of DC pt reported improvement in double vision and left eye weakness.     Ileus: POD#2 pt reported that he had not yet passed flatus post operatively. KUB obtained showing mild distention of bowel segments suggesting colonic ileus. Pt made NPO with sips and chips. KUB repeated the next morning showed persistent bowel distention more suggestive of ileus. Strict NPO with maintenance fluids implemented as well as scheduled IV reglan. Pt had a large bowel movement later that day (POD#3) and diet was resumed. Day of discharge (POD#4) pt had another BM that morning and was tolerating diet without abdominal complaints.     The remainder of the post operative period was uneventful. Wellington, arterial line, chest tubes, Diggs, and CVC were removed sequentially. Ventricular epicardial wires were clipped at the skin; education regarding retained wires provided. Pain regimen was adjusted for appropriate pain control. Of note patient has hx of his abuse and narcotics were limited as much as possible per his request. Neurontin, IV Tylenol, Toradol, and Lidocaine patch were utilized to minimize narcotics. Diet advanced as discussed above. Pt diuresed for volume overload; once daily 20mg Lasix continued at discharge x5 days. Pt worked  with therapy and was determined to be appropriate for discharge to home; C arranged.     At time of discharge patient is afebrile and hemodynamically stable. Maintaining sinus rhythm. Adequate SPO2 on RA. Pain well controlled. Tolerating diet without  nausea. Had BM this morning. Ambulating about room independently and in halls with walker with steady gait. Prior to DC pt  advised of restrictions to activity, lifting, bathing, and driving. Education regarding discharge medications, incision care, and s/s infection also provided. Pt verbalized understanding and was discharged to home in HDS condition. Surgical follow up documented in DC instructions.         Pertinent Physical Exam At Time of Discharge  Physical Exam  Vitals and nursing note reviewed.   Constitutional:       Appearance: Normal appearance. He is normal weight.   HENT:      Head: Normocephalic and atraumatic.      Nose: Nose normal.      Mouth/Throat:      Mouth: Mucous membranes are moist.      Pharynx: Oropharynx is clear.   Eyes:      General: Visual field deficit present.      Extraocular Movements:      Left eye: Abnormal extraocular motion present.      Pupils: Pupils are equal, round, and reactive to light.      Comments: Left 6th nerve palsy   +double vision in primary and lateral gaze     Cardiovascular:      Rate and Rhythm: Normal rate and regular rhythm.      Comments: Sternotomy is well approximated without erythema or drainage. Sternum stable.   Pulmonary:      Comments: Fair inspiratory volume  Diminished mid lung to bases bilaterally. No adventitious sounds appreciated   Abdominal:      General: Abdomen is flat. Bowel sounds are normal.      Palpations: Abdomen is soft.      Comments: LBM day of discharge    Musculoskeletal:         General: Normal range of motion.      Right lower leg: Edema present.      Left lower leg: Edema present.      Comments: Generalized post op weakness.  Ambulates independently in room, uses walker in  halls; gait steady   Trivial lower extremity edema   Skin:     General: Skin is warm and dry.   Neurological:      Mental Status: He is alert and oriented to person, place, and time.      Comments: Left eye palsy   Psychiatric:      Comments: Intermittently agitated and has been aggressive towards staff but currently cooperative with care.          Home Medications     Medication List      START taking these medications     clopidogrel 75 mg tablet; Commonly known as: Plavix; Take 1 tablet (75   mg) by mouth once daily. Do not start before December 12, 2023.; Start   taking on: December 12, 2023   furosemide 20 mg tablet; Commonly known as: Lasix; Take 1 tablet (20 mg)   by mouth once daily in the morning. First dose 12/12. Take once daily in   the morning for 5 days.   gabapentin 300 mg capsule; Commonly known as: Neurontin; Take 1 capsule   (300 mg) by mouth every 8 hours.   lidocaine 4 % patch; Place 1 patch over 12 hours on the skin once daily.   Remove & discard patch within 12 hours or as directed by MD. Do not start   before December 12, 2023.; Start taking on: December 12, 2023   potassium chloride CR 10 mEq ER tablet; Commonly known as: Klor-Con;   Take 1 tablet (10 mEq) by mouth once daily. Take 1 tablet daily while   taking Furosemide (x5 days). First dose 12/12. Do not crush, chew, or   split.     CHANGE how you take these medications     metoprolol tartrate 50 mg tablet; Commonly known as: Lopressor; Take 1   tablet by mouth 2 times a day.; What changed: when to take this,   additional instructions     CONTINUE taking these medications     Advair HFA 45-21 mcg/actuation inhaler; Generic drug: fluticasone   propion-salmeteroL   aspirin 81 mg EC tablet; Take 1 tablet (81 mg) by mouth once daily in   the morning. Take before meals.   atorvastatin 80 mg tablet; Commonly known as: Lipitor; Take 1 tablet (80   mg) by mouth once daily.   blood sugar diagnostic strip; 1 each once daily.   FreeStyle glucose  monitoring kit; 1 each 3 times a day as needed (for   low blood sugar symptoms).   isosorbide mononitrate ER 30 mg 24 hr tablet; Commonly known as: Imdur;   Take 1 tablet (30 mg) by mouth once daily.   lancets misc; 1 each once daily.   levothyroxine 88 mcg tablet; Commonly known as: Synthroid, Levoxyl; Take   1 tablet (88 mcg) by mouth once daily.   lisinopril 20 mg tablet; Take 1 tablet (20 mg) by mouth once daily.   nitroglycerin 0.4 mg SL tablet; Commonly known as: Nitrostat; Place 1   tablet (0.4 mg) under the tongue every 5 minutes if needed for chest pain.     STOP taking these medications     mupirocin 2 % ointment; Commonly known as: Bactroban   ranolazine 500 mg 12 hr tablet; Commonly known as: Ranexa       Outpatient Follow-Up  Future Appointments   Date Time Provider Department Center   12/22/2023 10:00 AM ONIEL DE GUZMAN [id number] NURSE [id number] [place]   1/8/2024  3:00 PM Natanael Wallace MD [id number] [facility]   1/18/2024  1:15 PM Ger Murphy MD [id number] [place]   2/7/2024  1:00 PM Joselin Simon PA-C [id number] [place]   5/2/2024  3:00 PM Torsten Richard MD [id number] [place]       Dariela Rust, APRN-CNP

## 2023-12-11 NOTE — PROGRESS NOTES
Nutrition Progress Note    RD attempted to complete [place] education though was sleeping and did not awaken to name. To follow-up as RD schedule allows and as pt available.

## 2023-12-11 NOTE — DISCHARGE INSTRUCTIONS
Best exercise is walking 3-4 times per day. Taking short frequent walks instead of long walks helps to build stamina and reduces the incidence of DVTs. Gradually increase activity level every day.     Pt instructed to inform Radiology of retained wires if ever needs MRI and to make appointment with cardiac surgeon if wires ever poke out or pt develops chronic drainage from the site of the previous wires; pt expressed understanding.    No NSAIDs (common over-the-counter NSAIDs are ibuprofen/Motrin/Advil, naproxen/Naprosyn/Aleve), for 3 months after cardiac surgery, if NSAIDs needed after 3 months clear use with cardiologist before starting.    Maintain sternal precautions for 6 weeks following surgery  No lifting more than 10 pounds for 6 weeks   May shower, no tub bathing for 2 weeks  Weigh yourself daily  Notify cardiac surgeon for any fevers, increased incisional pain or drainage or other concerning symptoms  No driving until cleared by Cardiac surgeon. Please ride in backseat whenever possible.     DO NOT drive until evaluated by neurology/ophthalmology and cleared.

## 2023-12-11 NOTE — PROGRESS NOTES
12/11/23 9342   Discharge Planning   Living Arrangements Spouse/significant other;Family members;Children   Support Systems Spouse/significant other;Family members   Assistance Needed NONE   Type of Residence Private residence   Do you have animals or pets at home? Yes   Type of Animals or Pets cat   Who is requesting discharge planning? Provider   Home or Post Acute Services In home services   Type of Home Care Services Home nursing visits;Home OT;Home PT   Patient expects to be discharged to: home with HHC   Does the patient need discharge transport arranged? No   Patient Choice   Provider Choice list and CMS website (https://medicare.gov/care-compare#search) for post-acute Quality and Resource Measure Data were provided and reviewed with: Patient   Patient / Family choosing to utilize agency / facility established prior to hospitalization Yes     Met with patient at bedside, states he is ready to be discharged home today. Pt is open to having home health care.  PCP and address, phone confirmed. Pt is independent, drives and uses no assistive devices.  POD 4 CABG/ Dr Cheryl Murphy. Pt follows with  PCP Joselin [facility]ILLO and [facility],  notified team and orders requested.

## 2023-12-11 NOTE — PROGRESS NOTES
"Liban Jaramillo is a 58 y.o. male on day 4 of admission presenting with Coronary artery disease involving native coronary artery of native heart with unstable angina pectoris (CMS/HCC).      Subjective   Pt. Walking around in room, states his double vision is better and he wants to go home. On exam CN VI palsy improving. No facial droop, no slurred speech. He states he intends to quit smoking when he goes home.        Objective     Last Recorded Vitals  Blood pressure 119/72, pulse 80, temperature 37.1 °C (98.8 °F), temperature source Tympanic, resp. rate 25, height 1.854 m (6' 0.99\"), weight 96.9 kg (213 lb 10 oz), SpO2 96 %.      Neurological Exam  Mental Status  Awake, alert and oriented to person, place and time. Speech is normal. Language is fluent with no aphasia. Attention and concentration are normal.    Cranial Nerves  CN III, IV, VI: Abnormal extraocular movements: Left. Pupils equal round and reactive to light bilaterally.  And EOM's Normal.    Motor   Strength is 5/5 throughout all four extremities.    Sensory  Light touch is normal in upper and lower extremities.       Relevant Results  Results for orders placed or performed during the hospital encounter of 12/07/23 (from the past 24 hour(s))   Magnesium   Result Value Ref Range    Magnesium 2.00 1.60 - 2.40 mg/dL   Renal Function Panel   Result Value Ref Range    Glucose 122 (H) 74 - 99 mg/dL    Sodium 136 136 - 145 mmol/L    Potassium 3.9 3.5 - 5.3 mmol/L    Chloride 106 98 - 107 mmol/L    Bicarbonate 22 21 - 32 mmol/L    Anion Gap 12 10 - 20 mmol/L    Urea Nitrogen 10 6 - 23 mg/dL    Creatinine 0.77 0.50 - 1.30 mg/dL    eGFR >90 >60 mL/min/1.73m*2    Calcium 8.2 (L) 8.6 - 10.3 mg/dL    Phosphorus 2.6 2.5 - 4.9 mg/dL    Albumin 3.3 (L) 3.4 - 5.0 g/dL   Coagulation Screen   Result Value Ref Range    Protime 12.3 9.8 - 12.8 seconds    INR 1.1 0.9 - 1.1    aPTT 31 27 - 38 seconds   CBC and Auto Differential   Result Value Ref Range    WBC 9.2 4.4 - 11.3 " x10*3/uL    nRBC 0.0 0.0 - 0.0 /100 WBCs    RBC 3.90 (L) 4.50 - 5.90 x10*6/uL    Hemoglobin 12.0 (L) 13.5 - 17.5 g/dL    Hematocrit 35.5 (L) 41.0 - 52.0 %    MCV 91 80 - 100 fL    MCH 30.8 26.0 - 34.0 pg    MCHC 33.8 32.0 - 36.0 g/dL    RDW 13.7 11.5 - 14.5 %    Platelets 192 150 - 450 x10*3/uL    Neutrophils % 63.2 40.0 - 80.0 %    Immature Granulocytes %, Automated 0.5 0.0 - 0.9 %    Lymphocytes % 18.2 13.0 - 44.0 %    Monocytes % 13.2 2.0 - 10.0 %    Eosinophils % 4.7 0.0 - 6.0 %    Basophils % 0.2 0.0 - 2.0 %    Neutrophils Absolute 5.79 1.20 - 7.70 x10*3/uL    Immature Granulocytes Absolute, Automated 0.05 0.00 - 0.70 x10*3/uL    Lymphocytes Absolute 1.67 1.20 - 4.80 x10*3/uL    Monocytes Absolute 1.21 (H) 0.10 - 1.00 x10*3/uL    Eosinophils Absolute 0.43 0.00 - 0.70 x10*3/uL    Basophils Absolute 0.02 0.00 - 0.10 x10*3/uL   IMPRESSION:  MRI BRAIN:  1. Subtle 5 mm focus of diffusion restriction consistent with acute  to early subacute infarct is present in the dorsal tegmentum of the  david, of the right of midline, immediately anterior to the 4th  ventricle near the expected location of the right medial longitudinal  fasciculus and right nucleus of cranial nerve 6. There is slight  local mass effect without evidence of hemorrhagic transformation.  2. No additional areas of diffusion restriction or acute  abnormalities are identified intracranially.  3. Within limitations of mildly motion degraded exam, several subtle  foci of hyperintense FLAIR and T2 signal present in the  periventricular and subcortical white matter of bilateral cerebral  hemispheres are nonspecific, but favored to represent changes of  microvascular disease.      MRI ORBITS:  1. No acute intraorbital pathology is identified. Optic nerves are  symmetric in size without evidence of edema bilaterally, within  limitations of somewhat motion degraded study.  Scheduled medications   Medication Dose Route Frequency    aspirin  81 mg oral Daily     atorvastatin  80 mg oral Nightly    clopidogrel  75 mg oral Daily    fluticasone  2 spray Each Nostril Daily    furosemide  20 mg oral Daily    gabapentin  300 mg oral q8h BRANDON    heparin (porcine)  5,000 Units subcutaneous q8h    isosorbide mononitrate ER  30 mg oral Daily    levothyroxine  88 mcg oral Daily    lidocaine  1 patch transdermal Daily    lisinopril  20 mg oral Daily    metoclopramide  10 mg intravenous TID    metoprolol tartrate  50 mg oral BID    nicotine  1 patch transdermal Daily    potassium chloride CR  10 mEq oral BID                       [place] Coma Scale  Best Eye Response: Spontaneous  Best Verbal Response: Oriented  Best Motor Response: Follows commands  [place] Coma Scale Score: 15                             Assessment/Plan      Principal Problem:    Coronary artery disease involving native coronary artery of native heart with unstable angina pectoris (CMS/HCC)    Impression:  Diplopia, abnormal EOM r/t acute right sided pontine s/p   History of CAD, s/p CABG  History of nicotine use  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 Sleep study to r/o ANA (wife reports snoring)  PT/OT    Discussed bleeding precautions with patient while on anti platelet and/or anticoagulation therapy. Discussed stroke prevention such as: HgbA1c <7, tight blood pressure control < 130/<80, antiplatelet and statin therapy (if indicated), and lifestyle modification (Mediterranean diet, daily exercise, nicotine cessation & limiting alcohol use). CPAP compliance. Discussed s/sx of stroke and to call 911 immediately.?      Can f/up with me as outpt.         I spent 35 minutes in the professional and overall care of this patient.      KENNEDY Bennett-CNP    Patient seen and examined.  Agree with the plan.  Continue with current treatment signs and of the respective CVL milligrams in the precautions were discussed at great length    Okay to  discharge on aspirin Plavix and statin    Patient can follow-up with us in 4 to 6 weeks.  [id number]    Due to technical limitations of voice recognition and human error, this note may not accurately reflect the care of the patient.

## 2023-12-12 ENCOUNTER — DOCUMENTATION (OUTPATIENT)
Dept: PRIMARY CARE | Facility: CLINIC | Age: 58
End: 2023-12-12
Payer: COMMERCIAL

## 2023-12-12 ENCOUNTER — HOME CARE VISIT (OUTPATIENT)
Dept: HOME HEALTH SERVICES | Facility: HOME HEALTH | Age: 58
End: 2023-12-12
Payer: COMMERCIAL

## 2023-12-12 VITALS
HEIGHT: 73 IN | HEART RATE: 82 BPM | DIASTOLIC BLOOD PRESSURE: 80 MMHG | RESPIRATION RATE: 18 BRPM | SYSTOLIC BLOOD PRESSURE: 130 MMHG | WEIGHT: 213 LBS | OXYGEN SATURATION: 99 % | TEMPERATURE: 97.7 F | BODY MASS INDEX: 28.23 KG/M2

## 2023-12-12 PROCEDURE — G0299 HHS/HOSPICE OF RN EA 15 MIN: HCPCS

## 2023-12-12 PROCEDURE — 0023 HH SOC

## 2023-12-12 PROCEDURE — G0152 HHCP-SERV OF OT,EA 15 MIN: HCPCS

## 2023-12-12 ASSESSMENT — ENCOUNTER SYMPTOMS
CHANGE IN APPETITE: UNCHANGED
PAIN LOCATION: CHEST
PAIN LOCATION - EXACERBATING FACTORS: COUGHING
PAIN LOCATION - PAIN QUALITY: SORE
PAIN LOCATION - PAIN FREQUENCY: CONSTANT
PAIN LOCATION - PAIN SEVERITY: 8/10
SUBJECTIVE PAIN PROGRESSION: UNCHANGED
HIGHEST PAIN SEVERITY IN PAST 24 HOURS: 9/10
PAIN: 1
PAIN: 1
PAIN SEVERITY GOAL: 0/10
PAIN LOCATION: CHEST
DOUBLE VISION: 1
LOWEST PAIN SEVERITY IN PAST 24 HOURS: 4/10
APPETITE LEVEL: GOOD
PAIN LOCATION - PAIN SEVERITY: 8/10

## 2023-12-12 ASSESSMENT — ACTIVITIES OF DAILY LIVING (ADL)
DRESSING_LB_CURRENT_FUNCTION: INDEPENDENT
ENTERING_EXITING_HOME: NEEDS ASSISTANCE
OASIS_M1830: 01
PHYSICAL TRANSFERS ASSESSED: 1
AMBULATION ASSISTANCE: INDEPENDENT
DRESSING_UB_CURRENT_FUNCTION: INDEPENDENT
AMBULATION ASSISTANCE: 1
CURRENT_FUNCTION: INDEPENDENT

## 2023-12-12 ASSESSMENT — LIFESTYLE VARIABLES: SMOKING_STATUS: 1

## 2023-12-13 ENCOUNTER — HOME CARE VISIT (OUTPATIENT)
Dept: HOME HEALTH SERVICES | Facility: HOME HEALTH | Age: 58
End: 2023-12-13
Payer: COMMERCIAL

## 2023-12-13 PROCEDURE — G0151 HHCP-SERV OF PT,EA 15 MIN: HCPCS

## 2023-12-13 SDOH — HEALTH STABILITY: PHYSICAL HEALTH
EXERCISE COMMENTS: INSTRUCTED IN HOME PROGRAM OF PROGRESIVE AMBUL TO MILD FATIGUE SEVERAL X DAY. DISCUSSED ATTENDING OUT PT CARD REHAB WHEN CARDIOLOGIST PERMITS

## 2023-12-13 ASSESSMENT — ENCOUNTER SYMPTOMS
PAIN: 1
HIGHEST PAIN SEVERITY IN PAST 24 HOURS: 8/10
PAIN LOCATION: CHEST
PERSON REPORTING PAIN: PATIENT
SUBJECTIVE PAIN PROGRESSION: UNCHANGED
LOWEST PAIN SEVERITY IN PAST 24 HOURS: 8/10
PAIN SEVERITY GOAL: 2/10

## 2023-12-13 ASSESSMENT — ACTIVITIES OF DAILY LIVING (ADL): AMBULATION ASSISTANCE ON FLAT SURFACES: 1

## 2023-12-13 NOTE — PROGRESS NOTES
Discharge Facility: [facility]  Discharge Diagnosis: Coronary artery disease involving native coronary artery of native heart with unstable angina pectoris (CMS/[facility]); CABG x3 (LIMA-LAD, RA-OM, SVG-PDA)  Admission Date: 12/7/2023  Discharge Date: 12/11/2023    PCP Appointment Date: TBD-office tasked to arrange follow up with PCP as needed  Specialist Appointment Date: 12/22 cardiac surg nurse; 1/8 cardiology; 1/18 Dr. Cheryl Murphy  Hospital Encounter and Summary: Linked  See discharge assessment below for further details  Engagement  Call Start Time: 1052 (12/13/2023 11:17 AM)    Medications  Medications reviewed with patient/caregiver?: Yes (new meds/changes discussed) (12/13/2023 11:17 AM)  Is the patient having any side effects they believe may be caused by any medication additions or changes?: No (12/13/2023 11:17 AM)  Does the patient have all medications ordered at discharge?: Yes (12/13/2023 11:17 AM)  Care Management Interventions: No intervention needed (12/13/2023 11:17 AM)  Prescription Comments: see med list (plavix; furosemide; gabapentin; lidocaine patch; potassium; metoprolol) (12/13/2023 11:17 AM)  Is the patient taking all medications as directed (includes completed medication regime)?: Yes (12/13/2023 11:17 AM)  Care Management Interventions: Provided patient education (12/13/2023 11:17 AM)    Appointments  Does the patient have a primary care provider?: Yes (12/13/2023 11:17 AM)  Care Management Interventions: Verified appointment date/time/provider (12/13/2023 11:17 AM)  Has the patient kept scheduled appointments due by today?: Yes (12/13/2023 11:17 AM)  Care Management Interventions: Advised patient to keep appointment (12/13/2023 11:17 AM)    Self Management  What is the home health agency?: [facility] (12/13/2023 11:17 AM)  Has home health visited the patient within 72 hours of discharge?: Yes (12/13/2023 11:17 AM)  What Durable Medical Equipment (DME) was ordered?: walker (12/13/2023  "11:17 AM)  Has all Durable Medical Equipment (DME) been delivered?: Yes (12/13/2023 11:17 AM)    Patient Teaching  Does the patient have access to their discharge instructions?: Yes (12/13/2023 11:17 AM)  Care Management Interventions: Reviewed instructions with patient (12/13/2023 11:17 AM)  What is the patient's perception of their health status since discharge?: Improving (12/13/2023 11:17 AM)  Is the patient/caregiver able to teach back the hierarchy of who to call/visit for symptoms/problems? PCP, Specialist, Home Health nurse, Urgent Care, ED, 911: Yes (12/13/2023 11:17 AM)  Patient/Caregiver Education Comments: Patient states he is doing well but has a concern about a \"lump\" that feels swollen in his sternum. States he felt a \"pop yesterday\" and then it appears and he is worried he pulled something. Advised patient to contact his cardiac surgeon due to his concerns. Patient alsos states a [facility] nurse will be there today so I also instructed him to show [facility] area of concern. Patient states his pain has been well managed and he has not had to take any pain medication. Medications, activity restrictions, and follow up appts discussed with patient during outreach. Office notified of patient's need to see his PCP asap. (12/13/2023 11:17 AM)        "

## 2023-12-13 NOTE — HOME HEALTH
pt lives with wife in basement of his sisters house. no stairs to enter side entrance. 9 stairs down to basement from landing. pt id self. ambul without device pre cabg [id number]. has no goals for p.t. is concerned about his double vision he developed from cva he had after cabg. is to see cardiologist [id number].

## 2023-12-14 ENCOUNTER — APPOINTMENT (OUTPATIENT)
Dept: CARDIOLOGY | Facility: HOSPITAL | Age: 58
End: 2023-12-14
Payer: COMMERCIAL

## 2023-12-14 ENCOUNTER — HOSPITAL ENCOUNTER (EMERGENCY)
Facility: HOSPITAL | Age: 58
Discharge: HOME | End: 2023-12-14
Attending: EMERGENCY MEDICINE
Payer: COMMERCIAL

## 2023-12-14 ENCOUNTER — OFFICE VISIT (OUTPATIENT)
Dept: CARDIAC SURGERY | Facility: CLINIC | Age: 58
End: 2023-12-14
Payer: COMMERCIAL

## 2023-12-14 ENCOUNTER — APPOINTMENT (OUTPATIENT)
Dept: RADIOLOGY | Facility: HOSPITAL | Age: 58
End: 2023-12-14
Payer: COMMERCIAL

## 2023-12-14 VITALS
BODY MASS INDEX: 29.42 KG/M2 | DIASTOLIC BLOOD PRESSURE: 69 MMHG | HEIGHT: 73 IN | TEMPERATURE: 99.2 F | HEART RATE: 79 BPM | OXYGEN SATURATION: 97 % | WEIGHT: 222 LBS | SYSTOLIC BLOOD PRESSURE: 112 MMHG

## 2023-12-14 VITALS
WEIGHT: 216 LBS | RESPIRATION RATE: 18 BRPM | OXYGEN SATURATION: 97 % | HEART RATE: 94 BPM | SYSTOLIC BLOOD PRESSURE: 126 MMHG | TEMPERATURE: 97.2 F | BODY MASS INDEX: 28.63 KG/M2 | HEIGHT: 73 IN | DIASTOLIC BLOOD PRESSURE: 75 MMHG

## 2023-12-14 DIAGNOSIS — S09.90XA CLOSED HEAD INJURY, INITIAL ENCOUNTER: ICD-10-CM

## 2023-12-14 DIAGNOSIS — I25.110 CORONARY ARTERY DISEASE INVOLVING NATIVE CORONARY ARTERY OF NATIVE HEART WITH UNSTABLE ANGINA PECTORIS (MULTI): Primary | ICD-10-CM

## 2023-12-14 DIAGNOSIS — J18.9 PNEUMONIA OF LEFT LUNG DUE TO INFECTIOUS ORGANISM, UNSPECIFIED PART OF LUNG: ICD-10-CM

## 2023-12-14 DIAGNOSIS — J90 PLEURISY WITH PLEURAL EFFUSION: Primary | ICD-10-CM

## 2023-12-14 LAB
ALBUMIN SERPL BCP-MCNC: 3.8 G/DL (ref 3.4–5)
ALP SERPL-CCNC: 53 U/L (ref 33–120)
ALT SERPL W P-5'-P-CCNC: 86 U/L (ref 10–52)
ANION GAP SERPL CALC-SCNC: 14 MMOL/L (ref 10–20)
AST SERPL W P-5'-P-CCNC: 43 U/L (ref 9–39)
BASOPHILS # BLD AUTO: 0.04 X10*3/UL (ref 0–0.1)
BASOPHILS NFR BLD AUTO: 0.4 %
BILIRUB SERPL-MCNC: 0.5 MG/DL (ref 0–1.2)
BUN SERPL-MCNC: 9 MG/DL (ref 6–23)
CALCIUM SERPL-MCNC: 8.7 MG/DL (ref 8.6–10.3)
CHLORIDE SERPL-SCNC: 103 MMOL/L (ref 98–107)
CO2 SERPL-SCNC: 22 MMOL/L (ref 21–32)
CREAT SERPL-MCNC: 0.79 MG/DL (ref 0.5–1.3)
EOSINOPHIL # BLD AUTO: 0.56 X10*3/UL (ref 0–0.7)
EOSINOPHIL NFR BLD AUTO: 4.9 %
ERYTHROCYTE [DISTWIDTH] IN BLOOD BY AUTOMATED COUNT: 13.6 % (ref 11.5–14.5)
FLUAV RNA RESP QL NAA+PROBE: NOT DETECTED
FLUBV RNA RESP QL NAA+PROBE: NOT DETECTED
GFR SERPL CREATININE-BSD FRML MDRD: >90 ML/MIN/1.73M*2
GLUCOSE SERPL-MCNC: 130 MG/DL (ref 74–99)
HCT VFR BLD AUTO: 38 % (ref 41–52)
HGB BLD-MCNC: 12.5 G/DL (ref 13.5–17.5)
HOLD SPECIMEN: NORMAL
HOLD SPECIMEN: NORMAL
IMM GRANULOCYTES # BLD AUTO: 0.14 X10*3/UL (ref 0–0.7)
IMM GRANULOCYTES NFR BLD AUTO: 1.2 % (ref 0–0.9)
LACTATE SERPL-SCNC: 1.9 MMOL/L (ref 0.4–2)
LYMPHOCYTES # BLD AUTO: 2 X10*3/UL (ref 1.2–4.8)
LYMPHOCYTES NFR BLD AUTO: 17.6 %
MCH RBC QN AUTO: 30 PG (ref 26–34)
MCHC RBC AUTO-ENTMCNC: 32.9 G/DL (ref 32–36)
MCV RBC AUTO: 91 FL (ref 80–100)
MONOCYTES # BLD AUTO: 1.19 X10*3/UL (ref 0.1–1)
MONOCYTES NFR BLD AUTO: 10.5 %
NEUTROPHILS # BLD AUTO: 7.41 X10*3/UL (ref 1.2–7.7)
NEUTROPHILS NFR BLD AUTO: 65.4 %
NRBC BLD-RTO: 0 /100 WBCS (ref 0–0)
PLATELET # BLD AUTO: 354 X10*3/UL (ref 150–450)
POTASSIUM SERPL-SCNC: 3.6 MMOL/L (ref 3.5–5.3)
PROT SERPL-MCNC: 7 G/DL (ref 6.4–8.2)
RBC # BLD AUTO: 4.17 X10*6/UL (ref 4.5–5.9)
SARS-COV-2 RNA RESP QL NAA+PROBE: NOT DETECTED
SODIUM SERPL-SCNC: 135 MMOL/L (ref 136–145)
WBC # BLD AUTO: 11.3 X10*3/UL (ref 4.4–11.3)

## 2023-12-14 PROCEDURE — 71046 X-RAY EXAM CHEST 2 VIEWS: CPT | Performed by: RADIOLOGY

## 2023-12-14 PROCEDURE — 99284 EMERGENCY DEPT VISIT MOD MDM: CPT | Performed by: EMERGENCY MEDICINE

## 2023-12-14 PROCEDURE — 87636 SARSCOV2 & INF A&B AMP PRB: CPT | Performed by: EMERGENCY MEDICINE

## 2023-12-14 PROCEDURE — 93005 ELECTROCARDIOGRAM TRACING: CPT

## 2023-12-14 PROCEDURE — 80053 COMPREHEN METABOLIC PANEL: CPT | Performed by: EMERGENCY MEDICINE

## 2023-12-14 PROCEDURE — 4004F PT TOBACCO SCREEN RCVD TLK: CPT | Performed by: THORACIC SURGERY (CARDIOTHORACIC VASCULAR SURGERY)

## 2023-12-14 PROCEDURE — 70450 CT HEAD/BRAIN W/O DYE: CPT

## 2023-12-14 PROCEDURE — 71046 X-RAY EXAM CHEST 2 VIEWS: CPT

## 2023-12-14 PROCEDURE — 83605 ASSAY OF LACTIC ACID: CPT | Performed by: EMERGENCY MEDICINE

## 2023-12-14 PROCEDURE — 3074F SYST BP LT 130 MM HG: CPT | Performed by: THORACIC SURGERY (CARDIOTHORACIC VASCULAR SURGERY)

## 2023-12-14 PROCEDURE — 3062F POS MACROALBUMINURIA REV: CPT | Performed by: THORACIC SURGERY (CARDIOTHORACIC VASCULAR SURGERY)

## 2023-12-14 PROCEDURE — 70450 CT HEAD/BRAIN W/O DYE: CPT | Performed by: RADIOLOGY

## 2023-12-14 PROCEDURE — 3048F LDL-C <100 MG/DL: CPT | Performed by: THORACIC SURGERY (CARDIOTHORACIC VASCULAR SURGERY)

## 2023-12-14 PROCEDURE — 3078F DIAST BP <80 MM HG: CPT | Performed by: THORACIC SURGERY (CARDIOTHORACIC VASCULAR SURGERY)

## 2023-12-14 PROCEDURE — 99285 EMERGENCY DEPT VISIT HI MDM: CPT

## 2023-12-14 PROCEDURE — 99285 EMERGENCY DEPT VISIT HI MDM: CPT | Mod: 25

## 2023-12-14 PROCEDURE — 96372 THER/PROPH/DIAG INJ SC/IM: CPT

## 2023-12-14 PROCEDURE — 36415 COLL VENOUS BLD VENIPUNCTURE: CPT | Performed by: EMERGENCY MEDICINE

## 2023-12-14 PROCEDURE — 3044F HG A1C LEVEL LT 7.0%: CPT | Performed by: THORACIC SURGERY (CARDIOTHORACIC VASCULAR SURGERY)

## 2023-12-14 PROCEDURE — 2500000004 HC RX 250 GENERAL PHARMACY W/ HCPCS (ALT 636 FOR OP/ED): Performed by: EMERGENCY MEDICINE

## 2023-12-14 PROCEDURE — 85025 COMPLETE CBC W/AUTO DIFF WBC: CPT | Performed by: EMERGENCY MEDICINE

## 2023-12-14 PROCEDURE — 99213 OFFICE O/P EST LOW 20 MIN: CPT | Performed by: THORACIC SURGERY (CARDIOTHORACIC VASCULAR SURGERY)

## 2023-12-14 PROCEDURE — 4010F ACE/ARB THERAPY RXD/TAKEN: CPT | Performed by: THORACIC SURGERY (CARDIOTHORACIC VASCULAR SURGERY)

## 2023-12-14 RX ORDER — PREDNISONE 20 MG/1
60 TABLET ORAL DAILY
Qty: 15 TABLET | Refills: 0 | Status: SHIPPED | OUTPATIENT
Start: 2023-12-14 | End: 2023-12-19

## 2023-12-14 RX ORDER — DOXYCYCLINE 100 MG/1
100 CAPSULE ORAL 2 TIMES DAILY
Qty: 20 CAPSULE | Refills: 0 | Status: SHIPPED | OUTPATIENT
Start: 2023-12-14 | End: 2023-12-24

## 2023-12-14 RX ORDER — HYDROMORPHONE HYDROCHLORIDE 1 MG/ML
1 INJECTION, SOLUTION INTRAMUSCULAR; INTRAVENOUS; SUBCUTANEOUS ONCE
Status: DISCONTINUED | OUTPATIENT
Start: 2023-12-14 | End: 2023-12-14 | Stop reason: HOSPADM

## 2023-12-14 RX ORDER — KETOROLAC TROMETHAMINE 30 MG/ML
30 INJECTION, SOLUTION INTRAMUSCULAR; INTRAVENOUS ONCE
Status: COMPLETED | OUTPATIENT
Start: 2023-12-14 | End: 2023-12-14

## 2023-12-14 RX ORDER — ALBUTEROL SULFATE 90 UG/1
1-2 AEROSOL, METERED RESPIRATORY (INHALATION) EVERY 6 HOURS PRN
Qty: 18 G | Refills: 0 | Status: SHIPPED | OUTPATIENT
Start: 2023-12-14 | End: 2024-01-13

## 2023-12-14 RX ADMIN — KETOROLAC TROMETHAMINE 30 MG: 30 INJECTION, SOLUTION INTRAMUSCULAR; INTRAVENOUS at 16:28

## 2023-12-14 ASSESSMENT — LIFESTYLE VARIABLES
REASON UNABLE TO ASSESS: YES
EVER HAD A DRINK FIRST THING IN THE MORNING TO STEADY YOUR NERVES TO GET RID OF A HANGOVER: NO
EVER FELT BAD OR GUILTY ABOUT YOUR DRINKING: NO
HAVE PEOPLE ANNOYED YOU BY CRITICIZING YOUR DRINKING: NO
HAVE YOU EVER FELT YOU SHOULD CUT DOWN ON YOUR DRINKING: NO

## 2023-12-14 ASSESSMENT — PAIN SCALES - GENERAL: PAINLEVEL_OUTOF10: 0 - NO PAIN

## 2023-12-14 ASSESSMENT — PAIN - FUNCTIONAL ASSESSMENT: PAIN_FUNCTIONAL_ASSESSMENT: 0-10

## 2023-12-14 ASSESSMENT — COLUMBIA-SUICIDE SEVERITY RATING SCALE - C-SSRS
1. IN THE PAST MONTH, HAVE YOU WISHED YOU WERE DEAD OR WISHED YOU COULD GO TO SLEEP AND NOT WAKE UP?: NO
2. HAVE YOU ACTUALLY HAD ANY THOUGHTS OF KILLING YOURSELF?: NO
6. HAVE YOU EVER DONE ANYTHING, STARTED TO DO ANYTHING, OR PREPARED TO DO ANYTHING TO END YOUR LIFE?: NO

## 2023-12-14 NOTE — ED PROVIDER NOTES
"HPI   Chief Complaint   Patient presents with    Fall     Hit head, fell out of bed, on plavix, had triple bipass 12/7, states he has a blood clot in the brain after the surgery and had a stroke         History provided by:  Patient (Dr. Houston)  58-year-old male presents with cough and increased postoperative pain from his triple bypass that he had approximately 10 days ago.  He states that the cough is productive of clearish sputum.  No fever or chills.  No back or flank pain.  No abdominal pain.  No leg swelling.  Nothing seems to make his pain worse or better.  He shares that this morning he coughed so hard that he fell out of bed and hit his head.  He did not lose consciousness.  He is on Plavix.  No treatment prior to arrival.  Nothing seems to make his symptoms worse or better.                    [place] Coma Scale Score: 15                  Patient History   Past Medical History:   Diagnosis Date    Anxiety     Arthritis     Cervical disc disease     COPD (chronic obstructive pulmonary disease) (CMS/HCC)     Coronary artery disease     Diabetes mellitus (CMS/HCC)     \"diet controlled\" and is not testing his glucose    Disease of thyroid gland     GERD (gastroesophageal reflux disease)     Hyperlipidemia     Hypertension     Myocardial infarction (CMS/HCC)     Nephrolithiasis     PTSD (post-traumatic stress disorder)     Skin disorder     states rash on occassion    Wears glasses     [place] teeth extracted      Past Surgical History:   Procedure Laterality Date    CARDIAC CATHETERIZATION      HERNIA REPAIR      TONSILLECTOMY      WISDOM TOOTH EXTRACTION       Family History   Problem Relation Name Age of Onset    Diabetes Mother      Cancer Mother      Diabetes Father      Liver disease Father      Esophageal cancer Brother      Throat cancer Maternal Grandfather       Social History     Tobacco Use    Smoking status: Former     Packs/day: 0.50     Years: 46.00     Additional pack years: 0.00     Total pack " years: 23.00     Types: Cigarettes     Quit date: 2023     Years since quittin.0    Smokeless tobacco: Never   Vaping Use    Vaping Use: Never used   Substance Use Topics    Alcohol use: Yes     Alcohol/week: 3.0 standard drinks of alcohol     Types: 3 Cans of beer per week    Drug use: Not Currently     Comment: before everything       Physical Exam   ED Triage Vitals [23 1510]   Temp Heart Rate Resp BP   36.2 °C (97.2 °F) 82 18 133/71      SpO2 Temp src Heart Rate Source Patient Position   99 % -- -- --      BP Location FiO2 (%)     -- --       Physical Exam  Physical Exam:    ED Triage Vitals [23 1510]   Temp Heart Rate Resp BP   36.2 °C (97.2 °F) 82 18 133/71      SpO2 Temp src Heart Rate Source Patient Position   99 % -- -- --      BP Location FiO2 (%)     -- --         Constitutional: Vital signs per nursing notes.  Well developed, well nourished.  No acute distress.    Psychiatric: alert and oriented to person, place, and time; no abnormalities of mood or affect; memory intact  Eyes: PERRL; conjunctivae and lids normal; EOMI  ENT: otoscopic exam of external canal and TM´s normal; nasal mucosa, turbinates, and septum normal; mouth, tongue, and pharynx normal; pharynx without edema, exudate, or injection  Neck: neck supple, no meningismus; trachea midline without deviation; no lymphadenopathy; no thyromegaly; carotid pulses even bilaterally  Chest: no masses or tenderness, no discharge  Respiratory: normal respiratory effort and excursion; no rales, rhonchi, or wheezes; equal air entry  Cardiovascular: regular rate and rhythm; no murmurs, rubs or gallops; symmetric pulses; no edema; normal capillary refill; distal pulses present  Neurological: normal speech; CN II-XII grossly intact; normal motor and sensory function; no nystagmus; no pronator drift  GI: no masses, tenderness, rebound or guarding; no palpable, pulsatile mass; no organomegaly; no hernia; normal bowel sounds; (-) Araiza´s  sign; (-) McBurney´s sign; (-) CVA tenderness  Lymphatic: no adenopathy of neck  Musculoskeletal: normal gait and station; normal digits and nails; no gross tendon or ligament injury; normal to palpation; normal strength/tone; neurovascular status intact; (-) Rico´s sign  Skin: normal to inspection; normal to palpation; no rash; postop scar on his chest is clean, dry and intact  GCS: 15    ED Course & MDM   Diagnoses as of 12/14/23 1635   Pleurisy with pleural effusion   Pneumonia of left lung due to infectious organism, unspecified part of lung   Closed head injury, initial encounter       Medical Decision Making  Medical Decision Making:    Differential Diagnoses Considered: Postoperative pain, pneumonia, pleurisy, ACS, PE     EKG: My interpretation of EKG is normal sinus rhythm at 75 bpm with nonspecific ST-T changes    Labs Reviewed   CBC WITH AUTO DIFFERENTIAL - Abnormal       Result Value    WBC 11.3      nRBC 0.0      RBC 4.17 (*)     Hemoglobin 12.5 (*)     Hematocrit 38.0 (*)     MCV 91      MCH 30.0      MCHC 32.9      RDW 13.6      Platelets 354      Neutrophils % 65.4      Immature Granulocytes %, Automated 1.2 (*)     Lymphocytes % 17.6      Monocytes % 10.5      Eosinophils % 4.9      Basophils % 0.4      Neutrophils Absolute 7.41      Immature Granulocytes Absolute, Automated 0.14      Lymphocytes Absolute 2.00      Monocytes Absolute 1.19 (*)     Eosinophils Absolute 0.56      Basophils Absolute 0.04     COMPREHENSIVE METABOLIC PANEL - Abnormal    Glucose 130 (*)     Sodium 135 (*)     Potassium 3.6      Chloride 103      Bicarbonate 22      Anion Gap 14      Urea Nitrogen 9      Creatinine 0.79      eGFR >90      Calcium 8.7      Albumin 3.8      Alkaline Phosphatase 53      Total Protein 7.0      AST 43 (*)     Bilirubin, Total 0.5      ALT 86 (*)    LACTATE - Normal    Lactate 1.9      Narrative:     Venipuncture immediately after or during the administration of Metamizole may lead to falsely low  results. Testing should be performed immediately  prior to Metamizole dosing.   SARS-COV-2 AND INFLUENZA A/B PCR - Normal    Flu A Result Not Detected      Flu B Result Not Detected      Coronavirus 2019, PCR Not Detected      Narrative:     This assay has received FDA Emergency Use Authorization (EUA) and  is only authorized for the duration of time that circumstances exist to justify the authorization of the emergency use of in vitro diagnostic tests for the detection of SARS-CoV-2 virus and/or diagnosis of COVID-19 infection under section 564(b)(1) of the Act, 21 U.S.C. 360bbb-3(b)(1). Testing for SARS-CoV-2 is only recommended for patients who meet current clinical and/or epidemiological criteria as defined by federal, state, or local public health directives. This assay is an in vitro diagnostic nucleic acid amplification test for the qualitative detection of SARS-CoV-2, Influenza A, and Influenza B from nasopharyngeal specimens and has been validated for use at [facility]. Negative results do not preclude COVID-19 infections or Influenza A/B infections, and should not be used as the sole basis for diagnosis, treatment, or other management decisions. If Influenza A/B and RSV PCR results are negative, testing for Parainfluenza virus, Adenovirus and Metapneumovirus is routinely performed for [facility] pediatric oncology and intensive care inpatients, and is available on other patients by placing an add-on request.    BLOOD GAS VENOUS FULL PANEL       CT head wo IV contrast   Final Result   No acute intracranial process.        Sinusitis changes as detailed.        MACRO:   None.        Signed by: Waldo Carmichael 12/14/2023 3:56 PM   Dictation workstation:   [id number]      XR chest 2 views   Final Result   1.  New left basilar atelectasis versus small infiltrate with   possible small volume pleural effusion.                  MACRO:   None.        Signed by: Waldo Carmichael 12/14/2023 3:53 PM    Dictation workstation:   [id number]          Diagnostic testing considered:         Review of recent and relevant records:    ED Medication Administration:   ED Medication Administration from 12/14/2023 1437 to 12/14/2023 1635         Date/Time Order Dose Route Action Action by     12/14/2023 1525 EST HYDROmorphone (Dilaudid) injection 1 mg 1 mg intravenous Not Given GALDINO Bentley     12/14/2023 1628 EST ketorolac (Toradol) injection 30 mg 30 mg intramuscular Given GALDINO Arrieta            Prescription Medication Consideration/Given:     Social Determinants of Health Significantly Affecting Care:      Summary:    /75 (12/14/23 1630)    Temp      Pulse 94 (12/14/23 1630)   Resp 18 (12/14/23 1630)    SpO2 97 % (12/14/23 1630)      Abnormal Labs Reviewed   CBC WITH AUTO DIFFERENTIAL - Abnormal; Notable for the following components:       Result Value    RBC 4.17 (*)     Hemoglobin 12.5 (*)     Hematocrit 38.0 (*)     Immature Granulocytes %, Automated 1.2 (*)     Monocytes Absolute 1.19 (*)     All other components within normal limits   COMPREHENSIVE METABOLIC PANEL - Abnormal; Notable for the following components:    Glucose 130 (*)     Sodium 135 (*)     AST 43 (*)     ALT 86 (*)     All other components within normal limits       Prior to the patient's arrival in the emergency department, I did receive a call from the nurse practitioner for his cardiac surgeon.  The cardiac surgeon and his team did also come down to the emergency department to see the patient and talk to me.  They share that they just wanted a chest x-ray and some IV Toradol for the patient to make sure that he did not have a pneumonia and get pain control.      Diagnostic evaluation was completed.  CBC showed a normal white blood cell count so I do not suspect an overwhelming infectious etiology.  There is mild anemia with a hemoglobin of 12.5.  I suspect this is likely postoperative and chronic in nature and does not represent acute blood loss.   Influenza and COVID are negative.  Metabolic panel showed a slightly elevated glucose at 130.  Sodium is slightly low at 135.  Otherwise it was unremarkable.  AST and ALT were slightly elevated at 43 and 86.  Lactate was in the normal range so do not suspect sepsis.  CT of the head shows no acute intracranial process.  Chest x-ray shows new left basilar atelectasis versus small infiltrate with possible small volume pleural effusion.    The patient was treated with a dose of IM Toradol.  He was initially offered IM Dilaudid but the patient declined as he has a history of opiate use disorder and does not want that.    On repeat evaluation at 1635 the patient is feeling better.  His vital signs are stable.  He is resting comfortably.    I suspected the patient likely has pleurisy secondary to the infiltrate and effusion.  He will be discharged home with short-term follow-up with his doctors.  He will be prescribed an inhaler, steroids and antibiotics.    I discussed the results and discharge plan with the patient and/or family/friend if present.  I emphasized the importance of follow up with the physician I referred them to in the timeframe recommended.  I explained reasons for the patient to return to the Emergency Department.  Questions were addressed.  The patient and/or family/friend expressed understanding.      Diagnoses as of 12/14/23 1635   Pleurisy with pleural effusion   Pneumonia of left lung due to infectious organism, unspecified part of lung   Closed head injury, initial encounter         Procedure  Procedures     Vic TRAN MD  12/14/23 1635

## 2023-12-14 NOTE — PROGRESS NOTES
"Subjective   Patient is a 58 y.o. male who presents with aortic valve disease secondary to { ETIOLOGY:09611}. Current symptoms that the patient reports are {SYMPTOMS:83400}. Echocardiography reveals {ECHO FINDINGS:50235}. Other cardiac history includes {diagnoses; cardiac:41290}. Additional pertinant findings include { FINDINGS:40809}. Cardiac risk factors include {findings; risks cardiac:36532}.    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 (CMS/[facility]) 11/07/2023    Peyronie disease 11/02/2023    Erectile dysfunction 11/02/2023    Abnormal EKG 10/25/2023    Family history of ischemic heart disease 10/25/2023    [place] (dyspnea on exertion) 10/25/2023    Pain of left upper extremity 10/25/2023    Tobacco abuse 10/25/2023    Neck pain 10/25/2023    Radicular pain 10/25/2023    Rotator cuff arthropathy, left 10/25/2023    Snores 10/25/2023    ANA (obstructive sleep apnea) 10/25/2023    Bilateral carotid bruits 10/25/2023    Hypothyroidism 09/15/2023    Hypertension 09/15/2023     Past Medical History:   Diagnosis Date    Anxiety     Arthritis     Cervical disc disease     COPD (chronic obstructive pulmonary disease) (CMS/[facility])     Coronary artery disease     Diabetes mellitus (CMS/[facility])     \"diet controlled\" and is not testing his glucose    Disease of thyroid gland     GERD (gastroesophageal reflux disease)     Hyperlipidemia     Hypertension     Myocardial infarction (CMS/[facility])     Nephrolithiasis     PTSD (post-traumatic stress disorder)     Skin disorder     states rash on occassion    Wears glasses     [place] teeth extracted       Past Surgical History:   Procedure Laterality Date    CARDIAC CATHETERIZATION      HERNIA REPAIR "      TONSILLECTOMY      WISDOM TOOTH EXTRACTION        (Not in a hospital admission)      Review of systems negative except for ***.      Data Review: {icu labs:31517}    ECG: {EC}

## 2023-12-14 NOTE — PROGRESS NOTES
"CARDIOTHORACIC SURGERY FOLLOW-UP PROGRESS NOTE  Subjective   Liban Jaramillo [id number] 1965 12/14/2023    Patient is a 58 y.o. male who presents for routine post-operative follow up. Complains of incisional pain and cough    Objective   /69 (BP Location: Right arm, Patient Position: Sitting)   Pulse 79   Temp 37.3 °C (99.2 °F) (Tympanic)   Ht 1.854 m (6' 1\")   Wt 101 kg (222 lb)   SpO2 97%   BMI 29.29 kg/m²   Heart: Regular rate and rhythm, S1, S2 normal, no murmur, click, rub or gallop.  Lungs: Clear to auscultation bilaterally.  Abdomen: Soft, non-distended, non-tender to palpation.  Extremities: Warm, well perfused, pulses intact, no cyanosis, clubbing, or edema.  Neuro: Alert and oriented X4, moving all extremities with no deficits observed.  Incision(s): Well healing without erythema or drainage.  Imaging: Chest x-ray reviewed. Sternum healing well.    Assessment/Plan   There are no diagnoses linked to this encounter.  Send to er for cxr and pain managment  Ger Murphy MD    "

## 2023-12-15 ENCOUNTER — APPOINTMENT (OUTPATIENT)
Dept: CARDIAC SURGERY | Facility: CLINIC | Age: 58
End: 2023-12-15
Payer: COMMERCIAL

## 2023-12-16 ENCOUNTER — E-PRESCRIBE (OUTPATIENT)
Dept: CARDIAC SURGERY | Facility: CLINIC | Age: 58
End: 2023-12-16

## 2023-12-16 DIAGNOSIS — Z95.1 S/P CABG (CORONARY ARTERY BYPASS GRAFT): ICD-10-CM

## 2023-12-16 DIAGNOSIS — B59 PNEUMONIA OF BOTH UPPER LOBES DUE TO PNEUMOCYSTIS JIROVECII (MULTI): Primary | ICD-10-CM

## 2023-12-16 PROCEDURE — 99214 OFFICE O/P EST MOD 30 MIN: CPT | Mod: ZK | Performed by: NURSE PRACTITIONER

## 2023-12-16 RX ORDER — CYCLOBENZAPRINE HCL 5 MG
5 TABLET ORAL EVERY 8 HOURS PRN
Qty: 30 TABLET | Refills: 0 | Status: SHIPPED | OUTPATIENT
Start: 2023-12-16 | End: 2023-12-26

## 2023-12-16 RX ORDER — OXYCODONE AND ACETAMINOPHEN 5; 325 MG/1; MG/1
1 TABLET ORAL EVERY 6 HOURS PRN
Qty: 5 TABLET | Refills: 0 | Status: SHIPPED | OUTPATIENT
Start: 2023-12-16 | End: 2023-12-18

## 2023-12-16 RX ORDER — HYDROCODONE POLISTIREX AND CHLORPHENIRAMINE POLISTIREX 10; 8 MG/5ML; MG/5ML
5 SUSPENSION, EXTENDED RELEASE ORAL EVERY 12 HOURS PRN
Qty: 50 ML | Refills: 0 | Status: SHIPPED | OUTPATIENT
Start: 2023-12-16 | End: 2023-12-23

## 2023-12-18 ENCOUNTER — OFFICE VISIT (OUTPATIENT)
Dept: PRIMARY CARE | Facility: CLINIC | Age: 58
End: 2023-12-18
Payer: COMMERCIAL

## 2023-12-18 VITALS
WEIGHT: 229 LBS | HEIGHT: 73 IN | BODY MASS INDEX: 30.35 KG/M2 | HEART RATE: 75 BPM | DIASTOLIC BLOOD PRESSURE: 84 MMHG | OXYGEN SATURATION: 96 % | SYSTOLIC BLOOD PRESSURE: 118 MMHG | TEMPERATURE: 97.8 F | RESPIRATION RATE: 18 BRPM

## 2023-12-18 DIAGNOSIS — I63.49 CEREBROVASCULAR ACCIDENT (CVA) DUE TO EMBOLISM OF OTHER CEREBRAL ARTERY (MULTI): ICD-10-CM

## 2023-12-18 DIAGNOSIS — Z95.1 STATUS POST CORONARY ARTERY BYPASS GRAFT: ICD-10-CM

## 2023-12-18 DIAGNOSIS — I25.118 CORONARY ARTERY DISEASE OF NATIVE ARTERY OF NATIVE HEART WITH STABLE ANGINA PECTORIS (CMS-HCC): Primary | ICD-10-CM

## 2023-12-18 DIAGNOSIS — B59 PNEUMONIA OF BOTH UPPER LOBES DUE TO PNEUMOCYSTIS JIROVECII (MULTI): ICD-10-CM

## 2023-12-18 PROBLEM — I25.110 CORONARY ARTERY DISEASE INVOLVING NATIVE CORONARY ARTERY OF NATIVE HEART WITH UNSTABLE ANGINA PECTORIS (MULTI): Status: RESOLVED | Noted: 2023-11-30 | Resolved: 2023-12-18

## 2023-12-18 PROCEDURE — 99215 OFFICE O/P EST HI 40 MIN: CPT | Performed by: PHYSICIAN ASSISTANT

## 2023-12-18 PROCEDURE — 4004F PT TOBACCO SCREEN RCVD TLK: CPT | Performed by: PHYSICIAN ASSISTANT

## 2023-12-18 PROCEDURE — 3044F HG A1C LEVEL LT 7.0%: CPT | Performed by: PHYSICIAN ASSISTANT

## 2023-12-18 PROCEDURE — 3074F SYST BP LT 130 MM HG: CPT | Performed by: PHYSICIAN ASSISTANT

## 2023-12-18 PROCEDURE — 3062F POS MACROALBUMINURIA REV: CPT | Performed by: PHYSICIAN ASSISTANT

## 2023-12-18 PROCEDURE — 3079F DIAST BP 80-89 MM HG: CPT | Performed by: PHYSICIAN ASSISTANT

## 2023-12-18 PROCEDURE — 3048F LDL-C <100 MG/DL: CPT | Performed by: PHYSICIAN ASSISTANT

## 2023-12-18 PROCEDURE — 4010F ACE/ARB THERAPY RXD/TAKEN: CPT | Performed by: PHYSICIAN ASSISTANT

## 2023-12-18 RX ORDER — OXYCODONE AND ACETAMINOPHEN 7.5; 325 MG/1; MG/1
1 TABLET ORAL EVERY 6 HOURS PRN
Qty: 28 TABLET | Refills: 0 | Status: SHIPPED | OUTPATIENT
Start: 2023-12-18 | End: 2023-12-25

## 2023-12-18 RX ORDER — CODEINE PHOSPHATE AND GUAIFENESIN 10; 100 MG/5ML; MG/5ML
5 SOLUTION ORAL 4 TIMES DAILY
Qty: 400 ML | Refills: 0 | Status: SHIPPED | OUTPATIENT
Start: 2023-12-18

## 2023-12-18 NOTE — PROGRESS NOTES
"Subjective   Patient ID: Liban Jaramillo is a 58 y.o. male who presents for Hospital Follow-up (Pt here today for a [facility] follow up from 12/7/2023-12/11/2023 for CAD and had bypass x3 and then had stroke in hospital and pneumonia on left side pf chest. Then was in ER twice after that. Pt states every time he coughs he can feel bones moving inside his chest; Tramadol or Percocet isn't working and they gave him a cough syrup but didn't have it at his pharmacy so has to go to somewhere else. Pt is very weak, coughing so hard, pain from stomach up, can't sleep and tired. ).    HPI   Follow up visit after CABG     Diplopia:   - Began while in the hospital   - Causing associated dizziness and vertigo     Coughing:  - Has known COPD and has been diagnosed w/ post-operative pneumonia and atelectasis   - Reports coughing up sticky clear phlegm and coughing so hard it feels like \"his breast bones are pulling apart\"    Pain:  - Reports post-operative pain as \"worst of his life\"      Review of Systems  - Denies fever, constipation, diarrhea.  - Reports neck pain.    Objective   /84   Pulse 75   Temp 36.6 °C (97.8 °F)   Resp 18   Ht 1.854 m (6' 1\")   Wt 104 kg (229 lb)   SpO2 96%   BMI 30.21 kg/m²     Physical Exam  - Diffuse, bibasilar rhonchi and crackles worst in lower lobes  - Dyspneic at rest  - Cannot take a deep breath in without coughing  - No peripheral edema  - Tearful and stressed about his current state of health    Assessment/Plan   -  Rx for codeine-guaifenesin.  -  Rx for oxycodone-acetaminophen.  - Follow-up w/ cardiology.  - Schedule an appointment w/ neurology at earliest convenience.        Problem List Items Addressed This Visit          Cardiac and Vasculature    Coronary artery disease of native artery of native heart with stable angina pectoris (CMS/HCC) - Primary    Overview     - Cardiac cath 10/30/23 - LM 50%, LAD 90%, Cx 95-99%, RCA moderate diffuse plaque. LVEF 60% (bnp " 53).  - CABGx3 was done 12/7/23 by Dr. Cheryl ANDREWS-LAD, RA-OM, SVG-PDA

## 2023-12-18 NOTE — PROGRESS NOTES
"Subjective   Patient ID: Liban Jaramillo is a 58 y.o. male who presents for Hospital Follow-up (Pt here today for a [facility] follow up from 12/7/2023-12/11/2023 for CAD and had bypass x3 and then had stroke in hospital and pneumonia on left side pf chest. Then was in ER twice after that. Pt states every time he coughs he can feel bones moving inside his chest; Tramadol or Percocet isn't working and they gave him a cough syrup but didn't have it at his pharmacy so has to go to somewhere else. Pt is very weak, coughing so hard, pain from stomach up, can't sleep and tired. ).    HPI     Follow up visit after CABG/ pneumonia/ stroke   - struggling     Per wife, will be half asleep and will start talking which is worrisome   When sleeping will be swinging like he's fighting someone     Stoke in [place]     Review of Systems   Respiratory:  Positive for cough and shortness of breath.    Cardiovascular:  Positive for chest pain. Negative for palpitations and leg swelling.   Psychiatric/Behavioral:  Positive for dysphoric mood.        Objective   /84   Pulse 75   Temp 36.6 °C (97.8 °F)   Resp 18   Ht 1.854 m (6' 1\")   Wt 104 kg (229 lb)   SpO2 96%   BMI 30.21 kg/m²     Physical Exam  Constitutional:       General: He is not in acute distress.     Appearance: Normal appearance. He is not ill-appearing.   HENT:      Head: Normocephalic.   Cardiovascular:      Rate and Rhythm: Normal rate and regular rhythm.      Pulses: Normal pulses.      Heart sounds: Normal heart sounds. No murmur heard.     Comments: Incision along sternum - healing well, no dehiscence   Pulmonary:      Effort: Pulmonary effort is normal.      Breath sounds: Rhonchi present.   Neurological:      Mental Status: He is alert.   Psychiatric:         Mood and Affect: Mood normal.         Behavior: Behavior normal.       Assessment/Plan     Problem List Items Addressed This Visit       Coronary artery disease of native artery of native heart with " stable angina pectoris (CMS/HCC) - Primary    Overview     - Cardiac cath 10/30/23 - LM 50%, LAD 90%, Cx 95-99%, RCA moderate diffuse plaque. LVEF 60% (bnp 53).  - CABGx3 was done 12/7/23 by Dr. Cheryl Murphy - ANDREWS-LAD, RA-OM, SVG-PDA         Current Assessment & Plan     - Hospital follow up visit today after his CABG which was complicated by pneumonia and stroke   - Pt is still feeling unwell - cough is frequent and causing a great deal of pain around his incision site. Pt is in severe pain - will rx short course Percocet for pain relief. He is not hypoxic, normal vitals. Some rhonchi on exam but not in respiratory distress. No evidence of significant fluid overload - no edema.   - He does have follow up with his cardiac surgeon Dr. Cheryl Murphy this week Friday 12/22/23. Likely will get post-op echocardiogram at that time.   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

## 2023-12-19 ENCOUNTER — HOME CARE VISIT (OUTPATIENT)
Dept: HOME HEALTH SERVICES | Facility: HOME HEALTH | Age: 58
End: 2023-12-19
Payer: COMMERCIAL

## 2023-12-19 VITALS
HEART RATE: 74 BPM | RESPIRATION RATE: 18 BRPM | DIASTOLIC BLOOD PRESSURE: 76 MMHG | OXYGEN SATURATION: 97 % | TEMPERATURE: 97.4 F | SYSTOLIC BLOOD PRESSURE: 120 MMHG

## 2023-12-19 PROCEDURE — G0300 HHS/HOSPICE OF LPN EA 15 MIN: HCPCS

## 2023-12-19 ASSESSMENT — ACTIVITIES OF DAILY LIVING (ADL): MONEY MANAGEMENT (EXPENSES/BILLS): NEEDS ASSISTANCE

## 2023-12-19 ASSESSMENT — ENCOUNTER SYMPTOMS
BOWEL PATTERN NORMAL: 1
APPETITE LEVEL: GOOD
DEPRESSION: 1
DYSPHORIC MOOD: 1
PALPITATIONS: 0
HIGHEST PAIN SEVERITY IN PAST 24 HOURS: 0/10
DENIES PAIN: 1
SHORTNESS OF BREATH: 1
SUBJECTIVE PAIN PROGRESSION: UNCHANGED
OCCASIONAL FEELINGS OF UNSTEADINESS: 0
PAIN SEVERITY GOAL: 0/10
LOWEST PAIN SEVERITY IN PAST 24 HOURS: 0/10
LAST BOWEL MOVEMENT: 66827
COUGH: 1
LOSS OF SENSATION IN FEET: 0
CHANGE IN APPETITE: UNCHANGED
PERSON REPORTING PAIN: PATIENT

## 2023-12-19 ASSESSMENT — PAIN SCALES - PAIN ASSESSMENT IN ADVANCED DEMENTIA (PAINAD)
CONSOLABILITY: 0 - NO NEED TO CONSOLE.
NEGVOCALIZATION: 0
BODYLANGUAGE: 0 - RELAXED.
NEGVOCALIZATION: 0 - NONE.
BREATHING: 0
BODYLANGUAGE: 0
TOTALSCORE: 0
CONSOLABILITY: 0
FACIALEXPRESSION: 0 - SMILING OR INEXPRESSIVE.
FACIALEXPRESSION: 0

## 2023-12-19 NOTE — ASSESSMENT & PLAN NOTE
- Hospital follow up visit today after his CABG which was complicated by pneumonia and stroke   - Pt is still feeling unwell - cough is frequent and causing a great deal of pain around his incision site. Pt is in severe pain - will rx short course Percocet for pain relief. He is not hypoxic, normal vitals. Some rhonchi on exam but not in respiratory distress. No evidence of significant fluid overload - no edema.   - He does have follow up with his cardiac surgeon Dr. Cheryl Murphy this week Friday 12/22/23. Likely will get post-op echocardiogram at that time.   - Pt also still having some diplopia which is residual from his stroke - referral placed to neurologist for follow up - continue with aggressive risk factor modification - on Asa, Plavix, statin

## 2023-12-22 ENCOUNTER — APPOINTMENT (OUTPATIENT)
Dept: CARDIAC SURGERY | Facility: CLINIC | Age: 58
End: 2023-12-22
Payer: COMMERCIAL

## 2023-12-22 PROCEDURE — G0180 MD CERTIFICATION HHA PATIENT: HCPCS | Performed by: PHYSICIAN ASSISTANT

## 2023-12-26 ENCOUNTER — HOME CARE VISIT (OUTPATIENT)
Dept: HOME HEALTH SERVICES | Facility: HOME HEALTH | Age: 58
End: 2023-12-26
Payer: COMMERCIAL

## 2023-12-26 VITALS
BODY MASS INDEX: 28.76 KG/M2 | OXYGEN SATURATION: 98 % | HEART RATE: 78 BPM | DIASTOLIC BLOOD PRESSURE: 80 MMHG | RESPIRATION RATE: 20 BRPM | SYSTOLIC BLOOD PRESSURE: 130 MMHG | HEIGHT: 73 IN | WEIGHT: 217 LBS

## 2023-12-26 PROCEDURE — G0299 HHS/HOSPICE OF RN EA 15 MIN: HCPCS

## 2023-12-26 SDOH — ECONOMIC STABILITY: GENERAL

## 2023-12-26 ASSESSMENT — PAIN SCALES - PAIN ASSESSMENT IN ADVANCED DEMENTIA (PAINAD)
FACIALEXPRESSION: 2 - FACIAL GRIMACING.
BODYLANGUAGE: 1 - TENSE. DISTRESSED PACING. FIDGETING.
CONSOLABILITY: 0
BODYLANGUAGE: 1
FACIALEXPRESSION: 2
NEGVOCALIZATION: 0
CONSOLABILITY: 0 - NO NEED TO CONSOLE.
NEGVOCALIZATION: 0 - NONE.
BREATHING: 0
TOTALSCORE: 3

## 2023-12-26 ASSESSMENT — ACTIVITIES OF DAILY LIVING (ADL)
CURRENT_FUNCTION: STAND BY ASSIST
MONEY MANAGEMENT (EXPENSES/BILLS): INDEPENDENT
AMBULATION ASSISTANCE: 1
AMBULATION ASSISTANCE: STAND BY ASSIST
PHYSICAL TRANSFERS ASSESSED: 1

## 2023-12-26 ASSESSMENT — ENCOUNTER SYMPTOMS
SPUTUM COLOR: YELLOW
COUGH: 1
APPETITE LEVEL: GOOD
SPUTUM PRODUCTION: 1
LOWEST PAIN SEVERITY IN PAST 24 HOURS: 4/10
CHEST TIGHTNESS: 1
COUGH CHARACTERISTICS: PRODUCTIVE
MUSCLE WEAKNESS: 1
PAIN: 1
PAIN LOCATION: CHEST
SHORTNESS OF BREATH: 1
SPUTUM AMOUNT: MODERATE
SUBJECTIVE PAIN PROGRESSION: GRADUALLY IMPROVING
SPUTUM CONSISTENCY: THICK
HIGHEST PAIN SEVERITY IN PAST 24 HOURS: 5/10
PAIN SEVERITY GOAL: 0/10
CHEST PAIN QUALITY: ACHING
DYSPNEA ACTIVITY LEVEL: AFTER AMBULATING MORE THAN 20 FT

## 2023-12-27 ENCOUNTER — PATIENT OUTREACH (OUTPATIENT)
Dept: PRIMARY CARE | Facility: CLINIC | Age: 58
End: 2023-12-27
Payer: COMMERCIAL

## 2023-12-27 ENCOUNTER — TELEPHONE (OUTPATIENT)
Dept: CARDIOLOGY | Facility: CLINIC | Age: 58
End: 2023-12-27
Payer: COMMERCIAL

## 2023-12-27 NOTE — TELEPHONE ENCOUNTER
Pt's spouse calling states that he is lethargic and exhausted the past to 3 to 4 days. Pt states that he can only stay awake to get a drink of water and goes back to sleep. Pt's BP is 100/56 and wants to know if he should cut back on meds. Pt had triple by pass, stroke, and pneumonia recently and is still on antibiotic. Advised ER for evaluation. Pt declines and would like to know Dr. Natanael Wallace MD, FACC recommendations. Suhas

## 2023-12-28 ENCOUNTER — TELEPHONE (OUTPATIENT)
Dept: PRIMARY CARE | Facility: CLINIC | Age: 58
End: 2023-12-28
Payer: COMMERCIAL

## 2023-12-28 DIAGNOSIS — J44.9 CHRONIC OBSTRUCTIVE PULMONARY DISEASE, UNSPECIFIED COPD TYPE (MULTI): Primary | ICD-10-CM

## 2023-12-28 DIAGNOSIS — E11.59 TYPE 2 DIABETES MELLITUS WITH CARDIAC COMPLICATION (MULTI): ICD-10-CM

## 2023-12-28 NOTE — TELEPHONE ENCOUNTER
Dr. Conrad can you please review in Joselin's absence    Patient's wife called in the office stating that patient saw Joselin on 12/18/23 and mentioned doing a chest xray but they declined at the time. Patient is almost done with antibiotics and still cough and would now like xray.  She stated patient recently had triple by pass, stroke and pneumonia.      FYI IT SHOWS A MESSAGE WAS ALSO CALLED IN TO THE CARDIOLOGIST YESTERDAY AND PT WAS ADVISED TO GO TO ER AND HE DECLINED.

## 2023-12-28 NOTE — TELEPHONE ENCOUNTER
On-call.  Patient's wife called through the answering service about the patient's current illness.    The patient has multiple medical conditions including coronary artery disease for which she recently had a bypass.  The patient wife reports that he had nasal congestion and feeling tired and subsequently did a home COVID test which was positive.  Upon review of the patient's chart a call was made to Dr. Conrad covering for Dr. Chandler his PCP who had responded that he should go to the emergency room considering the patient's multiple medical conditions and his recent surgery.  They apparently called the cardiologist office also and it was documented that they should go to the emergency room.  I explained this to the wife of the patient who appropriately responded that they were no called and I was able to confirm in the patient's chart that Dr. Conrad's message was not conveyed to them.   She agreed to take him to the emergency room for evaluation.

## 2024-01-08 ENCOUNTER — APPOINTMENT (OUTPATIENT)
Dept: CARDIOLOGY | Facility: CLINIC | Age: 59
End: 2024-01-08
Payer: COMMERCIAL

## 2024-01-09 ENCOUNTER — OFFICE VISIT (OUTPATIENT)
Dept: CARDIOLOGY | Facility: CLINIC | Age: 59
End: 2024-01-09
Payer: COMMERCIAL

## 2024-01-09 VITALS
BODY MASS INDEX: 29.13 KG/M2 | WEIGHT: 219.8 LBS | DIASTOLIC BLOOD PRESSURE: 90 MMHG | HEIGHT: 73 IN | SYSTOLIC BLOOD PRESSURE: 118 MMHG | HEART RATE: 78 BPM

## 2024-01-09 DIAGNOSIS — R07.9 CHEST PAIN, UNSPECIFIED TYPE: ICD-10-CM

## 2024-01-09 DIAGNOSIS — I63.9 CEREBROVASCULAR ACCIDENT (CVA), UNSPECIFIED MECHANISM (MULTI): ICD-10-CM

## 2024-01-09 DIAGNOSIS — I25.10 CORONARY ARTERY DISEASE INVOLVING NATIVE CORONARY ARTERY OF NATIVE HEART WITHOUT ANGINA PECTORIS: ICD-10-CM

## 2024-01-09 DIAGNOSIS — I25.118 CORONARY ARTERY DISEASE OF NATIVE ARTERY OF NATIVE HEART WITH STABLE ANGINA PECTORIS (CMS-HCC): ICD-10-CM

## 2024-01-09 DIAGNOSIS — Z82.49 FAMILY HISTORY OF ISCHEMIC HEART DISEASE: ICD-10-CM

## 2024-01-09 DIAGNOSIS — R06.09 DOE (DYSPNEA ON EXERTION): ICD-10-CM

## 2024-01-09 DIAGNOSIS — R73.9 HYPERGLYCEMIA: ICD-10-CM

## 2024-01-09 DIAGNOSIS — E07.9 THYROID DISEASE: ICD-10-CM

## 2024-01-09 DIAGNOSIS — I10 PRIMARY HYPERTENSION: ICD-10-CM

## 2024-01-09 DIAGNOSIS — E78.2 MIXED HYPERLIPIDEMIA: ICD-10-CM

## 2024-01-09 DIAGNOSIS — R00.2 PALPITATIONS: ICD-10-CM

## 2024-01-09 DIAGNOSIS — M12.812 ROTATOR CUFF ARTHROPATHY, LEFT: ICD-10-CM

## 2024-01-09 DIAGNOSIS — G47.33 OSA (OBSTRUCTIVE SLEEP APNEA): ICD-10-CM

## 2024-01-09 DIAGNOSIS — Z72.0 TOBACCO ABUSE: ICD-10-CM

## 2024-01-09 DIAGNOSIS — N52.01 ERECTILE DYSFUNCTION DUE TO ARTERIAL INSUFFICIENCY: ICD-10-CM

## 2024-01-09 DIAGNOSIS — R06.83 SNORES: ICD-10-CM

## 2024-01-09 DIAGNOSIS — R94.31 ABNORMAL EKG: Primary | ICD-10-CM

## 2024-01-09 DIAGNOSIS — E11.59 TYPE 2 DIABETES MELLITUS WITH CARDIAC COMPLICATION (MULTI): ICD-10-CM

## 2024-01-09 DIAGNOSIS — J44.9 CHRONIC OBSTRUCTIVE PULMONARY DISEASE, UNSPECIFIED COPD TYPE (MULTI): ICD-10-CM

## 2024-01-09 DIAGNOSIS — E03.9 ACQUIRED HYPOTHYROIDISM: ICD-10-CM

## 2024-01-09 DIAGNOSIS — M79.602 PAIN OF LEFT UPPER EXTREMITY: ICD-10-CM

## 2024-01-09 DIAGNOSIS — I25.2 HISTORY OF ACUTE ANTERIOR WALL MI: ICD-10-CM

## 2024-01-09 DIAGNOSIS — R09.89 BILATERAL CAROTID BRUITS: ICD-10-CM

## 2024-01-09 DIAGNOSIS — M54.10 RADICULAR PAIN: ICD-10-CM

## 2024-01-09 PROCEDURE — 3080F DIAST BP >= 90 MM HG: CPT | Performed by: INTERNAL MEDICINE

## 2024-01-09 PROCEDURE — 3074F SYST BP LT 130 MM HG: CPT | Performed by: INTERNAL MEDICINE

## 2024-01-09 PROCEDURE — 4010F ACE/ARB THERAPY RXD/TAKEN: CPT | Performed by: INTERNAL MEDICINE

## 2024-01-09 PROCEDURE — 99214 OFFICE O/P EST MOD 30 MIN: CPT | Performed by: INTERNAL MEDICINE

## 2024-01-09 PROCEDURE — 1036F TOBACCO NON-USER: CPT | Performed by: INTERNAL MEDICINE

## 2024-01-09 RX ORDER — LISINOPRIL 20 MG/1
20 TABLET ORAL DAILY
Qty: 90 TABLET | Refills: 1 | Status: SHIPPED | OUTPATIENT
Start: 2024-01-09

## 2024-01-09 RX ORDER — NITROGLYCERIN 0.4 MG/1
0.4 TABLET SUBLINGUAL EVERY 5 MIN PRN
Qty: 30 TABLET | Refills: 3 | Status: SHIPPED | OUTPATIENT
Start: 2024-01-09 | End: 2025-01-08

## 2024-01-09 RX ORDER — CLOPIDOGREL BISULFATE 75 MG/1
75 TABLET ORAL DAILY
Qty: 90 TABLET | Refills: 1 | Status: SHIPPED | OUTPATIENT
Start: 2024-01-09

## 2024-01-09 RX ORDER — METOPROLOL SUCCINATE 25 MG/1
25 TABLET, EXTENDED RELEASE ORAL DAILY
Qty: 90 TABLET | Refills: 1 | Status: SHIPPED | OUTPATIENT
Start: 2024-01-09

## 2024-01-09 RX ORDER — ATORVASTATIN CALCIUM 80 MG/1
80 TABLET, FILM COATED ORAL DAILY
Qty: 90 TABLET | Refills: 1 | Status: SHIPPED | OUTPATIENT
Start: 2024-01-09

## 2024-01-09 RX ORDER — ASPIRIN 81 MG/1
81 TABLET ORAL
Qty: 90 TABLET | Refills: 1 | Status: SHIPPED | OUTPATIENT
Start: 2024-01-09

## 2024-01-09 NOTE — PROGRESS NOTES
CARDIOLOGY OFFICE NOTE    Date:   1/9/2024    Patient:    Liban Jaramillo    YOB: 1965    Primary Physician: Joselin Simon PA-C       Reason for Visit: 2-month follow-up.    HPI:     Liban Jaramillo was seen in cardiac evaluation at the  Cardiology office January 9, 2024.      The patients problems are listed as in the impression below.    Electronic medical records reviewed.    Patient returns.  He had bypass surgery 12/7/2023 at [facility] with LIMA to the LAD free radial to the OM and saphenous vein graft to the PDA.  He unfortunately had pontine CVA and has secondary double vision since.  Otherwise he states that he feels much improved.  He also had recent COVID.  He has had a persistent cough.  He has no other cardiovascular complaints.    Patient denies Chest Pain, SOB, Lightheadedness, Dizziness.  No recurrent TIA or CVA symptoms.  No CHF or Edema.  No Palpitations.  No GI,  or Bleeding Issues. No Recent Fever or Chills.     Cardiovascular and general review of systems is otherwise negative.    A 14-system review is otherwise negative, other than noted.     PHYSICAL EXAMINATION:      Vitals:    01/09/24 1058   BP: 118/90   Pulse: 78     General: No acute distress. Vital signs as noted. Alert and oriented.  Head And Neck Examination: No jugular venous distention, 2/6 bilateral carotid bruits, no mass. Carotid upstrokes preserved. Oral mucosa moist.  No xanthelasma. Head and neck examination otherwise unremarkable.  Lungs: Clear to auscultation and percussion. No wheezes, no rales,  and no rhonchi.  Chest: Excursion appeared to be normal. No chest wall tenderness on palpation.  Postoperative incisions.  Heart: Normal S1 and S2. No S3. No S4. No rub. Grade 1/6 systolic murmur, best heard at the left sternal border. Point of maximal impulse was within normal limits.  Abdomen: Soft. Nontender. No organomegaly. No bruits. No masses. Obese.  Extremities: No bipedal edema. No clubbing. No cyanosis.  Pulses  are strong throughout. No bruits.  Musculoskeletal Exam: No ulcers, otherwise unremarkable.  Neuro: Neurologically appeared grossly intact, no double vision at distance.  .  IMPRESSION:       Cardiovascular status stable  Postoperative pontine stroke 12/2023, secondary blurred double vision.  Abnormal ECG concerning for prior MI  Coronary artery disease, post cardiac catheterization 11/2023 (90% LAD, 95% left circumflex, 50% left main), post three-vessel bypass 12/2023 with LIMA to LAD, RFA to OM, SVG to PDA.  Normal LV systolic function, LVEF 60%.  Hyperdynamic left ventricular function by echocardiogram 11/2023.  Aortic valve sclerosis without stenosis  Hypertension  Hyperlipidemia  Hypothyroidism recently diagnosed  Hyperglycemic  Cervical disc disease with radiculopathy.  Carotid bruits.  Negative carotid ultrasound 11/2023  Degenerative joint disease with left rotator cuff disease.  Alcohol history.  Tobacco history  Family history of coronary artery disease  Otherwise as per assessment below.    RECOMMENDATIONS:      Patient had coronary bypass surgery and feels well secondarily except for a complication of pontine stroke for which she has residual double vision.  He is seeing neurology and ophthalmology as well as rehab.    He has scheduled follow-up with surgery January 22, 2024.    Would suggest that he continue his current medications with the addition of Toprol-XL 25 mg daily (he had discontinued his high-dose Lopressor due to low blood pressures).  Refills were provided.    Exercise dietary program.  Hydration.    Cardiac rehab was encouraged.    [employer] portal use was encouraged.    We will plan to see back in 6 weeks with Laboratory Studies and ECG as ordered.     Patient will follow up with their primary physician for general care.    The patient knows to contact medical care earlier if need be.      ALLERGIES:     Allergies   Allergen Reactions    Penicillins Anaphylaxis, Hives, Rash and Swelling         MEDICATIONS:     Current Outpatient Medications   Medication Instructions    Advair HFA 45-21 mcg/actuation inhaler 2 puffs, inhalation, 2 times daily PRN, States he uses it PRN     albuterol 90 mcg/actuation inhaler 1-2 puffs, inhalation, Every 6 hours PRN    aspirin 81 mg, oral, Daily before breakfast    atorvastatin (LIPITOR) 80 mg, oral, Daily    blood sugar diagnostic strip 1 each, miscellaneous, Daily    clopidogrel (PLAVIX) 75 mg, oral, Daily    codeine-guaifenesin (Robitussin-AC)  mg/5 mL syrup 5 mL, oral, 4 times daily    FreeStyle glucose monitoring kit 1 each, miscellaneous, 3 times daily PRN    furosemide (LASIX) 20 mg, oral, Every morning, First dose 12/12. Take once daily in the morning for 5 days.    gabapentin (NEURONTIN) 300 mg, oral, Every 8 hours scheduled    isosorbide mononitrate ER (IMDUR) 30 mg, oral, Daily    lancets misc 1 each, miscellaneous, Daily    levothyroxine (SYNTHROID, LEVOXYL) 88 mcg, oral, Daily    lidocaine 4 % patch 1 patch, transdermal, Daily, Remove & discard patch within 12 hours or as directed by MD.    lisinopril 20 mg, oral, Daily    metoprolol tartrate (LOPRESSOR) 50 mg, oral, 2 times daily    nitroglycerin (NITROSTAT) 0.4 mg, sublingual, Every 5 min PRN, UP TO 3 TIMES IF NO RELIEF CALL 911    potassium chloride CR (Klor-Con) 10 mEq ER tablet 10 mEq, oral, Daily, Take 1 tablet daily while taking Furosemide (x5 days). First dose 12/12. Do not crush, chew, or split.       ELECTROCARDIOGRAM:      None    CARDIAC TESTING:      None    LABORATORY DATA:        CBC:   Lab Results   Component Value Date    WBC 11.3 12/14/2023    RBC 4.17 (L) 12/14/2023    HGB 12.5 (L) 12/14/2023    HCT 38.0 (L) 12/14/2023     12/14/2023        CMP:    Lab Results   Component Value Date     (L) 12/14/2023    K 3.6 12/14/2023     12/14/2023    CO2 22 12/14/2023    BUN 9 12/14/2023    CREATININE 0.79 12/14/2023    GLUCOSE 130 (H) 12/14/2023    CALCIUM 8.7 12/14/2023        Magnesium:    Lab Results   Component Value Date    MG 2.00 12/11/2023       Lipid Profile:    Lab Results   Component Value Date    TRIG 153 (H) 12/06/2023    HDL 34.1 12/06/2023    LDLCALC 80 12/06/2023       Hepatic Function Panel:    Lab Results   Component Value Date    ALKPHOS 53 12/14/2023    ALT 86 (H) 12/14/2023    AST 43 (H) 12/14/2023    PROT 7.0 12/14/2023    BILITOT 0.5 12/14/2023    BILIDIR 0.1 12/06/2023       TSH:    Lab Results   Component Value Date    TSH 22.11 (H) 12/06/2023       HgBA1c:    Lab Results   Component Value Date    HGBA1C 6.3 (H) 12/06/2023         PT/INR:    Lab Results   Component Value Date    PROTIME 12.3 12/11/2023    INR 1.1 12/11/2023           PROBLEM LIST:     Patient Active Problem List   Diagnosis    Hypothyroidism    Hypertension    Abnormal EKG    Family history of ischemic heart disease    SMALLWOOD (dyspnea on exertion)    Pain of left upper extremity    Tobacco abuse    Neck pain    Radicular pain    Rotator cuff arthropathy, left    Snores    ANA (obstructive sleep apnea)    Bilateral carotid bruits    Peyronie disease    Erectile dysfunction    Coronary artery disease of native artery of native heart with stable angina pectoris (CMS/[facility])    Type 2 diabetes mellitus with cardiac complication (CMS/HCC)    COPD (chronic obstructive pulmonary disease) (CMS/[facility])             Natanael Wallace MD, [facility]   [facility] /  Cardiology      Of Note:  [employer] voice recognition dictation software was utilized partially in the preparation of this note, therefore, inaccuracies in spelling, word choice and punctuation may have occurred which were not recognized the time of signing.    Patient was seen and examined with total time of visit including chart preparation, rooming, and chart completion exceeding 40 minutes.      ----

## 2024-01-16 ENCOUNTER — TELEPHONE (OUTPATIENT)
Dept: CARDIOLOGY | Facility: HOSPITAL | Age: 59
End: 2024-01-16
Payer: COMMERCIAL

## 2024-01-16 NOTE — TELEPHONE ENCOUNTER
[place] participant 30 day visit completed via phone 1/10.  Coordinator will meet patient at visit 1/18 to provide study ID card.

## 2024-01-16 NOTE — TELEPHONE ENCOUNTER
Late Entry 12/13/23 Discharge visit completed for [facility] Trial via phone.  Patient aware of follow up appointments and contact numbers for study.

## 2024-01-25 ENCOUNTER — PATIENT OUTREACH (OUTPATIENT)
Dept: PRIMARY CARE | Facility: CLINIC | Age: 59
End: 2024-01-25
Payer: COMMERCIAL

## 2024-02-20 DIAGNOSIS — E11.59 TYPE 2 DIABETES MELLITUS WITH CARDIAC COMPLICATION (MULTI): Primary | ICD-10-CM

## 2024-02-23 ENCOUNTER — HOME CARE VISIT (OUTPATIENT)
Dept: HOME HEALTH SERVICES | Facility: HOME HEALTH | Age: 59
End: 2024-02-23

## 2024-02-23 ASSESSMENT — ACTIVITIES OF DAILY LIVING (ADL)
CURRENT_FUNCTION: INDEPENDENT
AMBULATION ASSISTANCE: 1
HOME_HEALTH_OASIS: 00
PHYSICAL TRANSFERS ASSESSED: 1
OASIS_M1830: 00
AMBULATION ASSISTANCE: INDEPENDENT

## 2024-02-23 ASSESSMENT — ENCOUNTER SYMPTOMS: DENIES PAIN: 1

## 2024-02-27 DIAGNOSIS — E11.59 TYPE 2 DIABETES MELLITUS WITH CARDIAC COMPLICATION (MULTI): ICD-10-CM

## 2024-02-28 RX ORDER — LANCETS
EACH MISCELLANEOUS
Qty: 200 EACH | Refills: 10 | Status: SHIPPED | OUTPATIENT
Start: 2024-02-28

## 2024-03-01 ENCOUNTER — DOCUMENTATION (OUTPATIENT)
Dept: CARDIOLOGY | Facility: HOSPITAL | Age: 59
End: 2024-03-01
Payer: COMMERCIAL

## 2024-03-01 NOTE — PROGRESS NOTES
Attempted to see patient in office and via phone call for [facility] Trial (90 day stroke assessment).  Unable to reach patient at either encounter.  Will continue to reach out.

## 2024-03-05 ENCOUNTER — DOCUMENTATION (OUTPATIENT)
Dept: RESEARCH | Age: 59
End: 2024-03-05
Payer: COMMERCIAL

## 2024-03-05 NOTE — RESEARCH NOTES
Attempted contact with patient for LeA[facility]s Trial 90 day stroke follow up.  Unable to reach.  Voicemail full.  Will reach out for a 3rd time later this week.

## 2024-03-08 ENCOUNTER — APPOINTMENT (OUTPATIENT)
Dept: CARDIOLOGY | Facility: CLINIC | Age: 59
End: 2024-03-08
Payer: COMMERCIAL

## 2024-03-25 DIAGNOSIS — E11.59 TYPE 2 DIABETES MELLITUS WITH CARDIAC COMPLICATION (MULTI): ICD-10-CM

## 2024-03-26 ENCOUNTER — DOCUMENTATION (OUTPATIENT)
Dept: CARDIOLOGY | Facility: HOSPITAL | Age: 59
End: 2024-03-26
Payer: COMMERCIAL

## 2024-03-26 RX ORDER — CALCIUM CITRATE/VITAMIN D3 200MG-6.25
TABLET ORAL 3 TIMES DAILY
Qty: 100 EACH | Refills: 1 | Status: SHIPPED | OUTPATIENT
Start: 2024-03-26

## 2024-03-26 NOTE — PROGRESS NOTES
Research Note-spoke with the patients wife Friday 3/22/24.  Patient is currently in [facility].  She is unsure of when he will get out.  Due to his incarceration I was unable to complete 90 day stroke follow up for David.  Will continue to reach out for regularly scheduled visits and left message with patients spouse.

## 2024-05-08 ENCOUNTER — DOCUMENTATION (OUTPATIENT)
Dept: CARDIOLOGY | Facility: HOSPITAL | Age: 59
End: 2024-05-08
Payer: MEDICAID

## 2024-05-08 NOTE — PROGRESS NOTES
Attempted to reach patient for Le[facility] Trial 6 month follow up.  Phone number is out of service.  Will attempt to reach out again over the next 60 days.

## 2024-05-14 ENCOUNTER — TELEPHONE (OUTPATIENT)
Dept: PRIMARY CARE | Facility: CLINIC | Age: 59
End: 2024-05-14
Payer: MEDICAID

## 2024-05-14 NOTE — TELEPHONE ENCOUNTER
----- Message from Joselin Simon PA-C sent at 5/13/2024  3:55 PM EDT -----  Call Liban Jaramillo please:   Pt never got his colonoscopy. Is he still willing to get this screening done to prevent colon cancer? If not, does he want to do the Cologuard stool test instead?   RICK Olivera

## 2024-05-21 ENCOUNTER — DOCUMENTATION (OUTPATIENT)
Dept: CARDIOLOGY | Facility: HOSPITAL | Age: 59
End: 2024-05-21
Payer: MEDICAID

## 2024-05-21 NOTE — PROGRESS NOTES
Attempted to reach patient again for 6 month [facility] follow up and phone number is disconnected.  Will continue attempts to patient or next contact.

## 2024-06-26 ENCOUNTER — DOCUMENTATION (OUTPATIENT)
Dept: CARDIOLOGY | Facility: HOSPITAL | Age: 59
End: 2024-06-26
Payer: MEDICAID

## 2024-06-26 NOTE — PROGRESS NOTES
Reached out to patient for last attempt at 6 months [facility] follow [facility].  Number is still not in service.  Will file deviation appropriately and continue attempts at future follow up.

## 2024-07-11 ENCOUNTER — APPOINTMENT (OUTPATIENT)
Dept: CARDIOLOGY | Facility: CLINIC | Age: 59
End: 2024-07-11
Payer: MEDICAID

## 2024-07-11 PROBLEM — N52.01 ERECTILE DYSFUNCTION DUE TO ARTERIAL INSUFFICIENCY: Status: ACTIVE | Noted: 2023-11-02

## 2024-07-11 PROBLEM — I63.9 CEREBROVASCULAR ACCIDENT (CVA) (MULTI): Status: ACTIVE | Noted: 2024-07-11

## 2024-07-11 PROBLEM — F17.200 NICOTINE DEPENDENCE: Status: ACTIVE | Noted: 2023-11-21

## 2024-07-11 PROBLEM — B37.31 CANDIDIASIS OF VAGINA: Status: ACTIVE | Noted: 2024-07-11

## 2024-07-11 PROBLEM — R06.81 APNEA: Status: ACTIVE | Noted: 2024-07-11

## 2024-07-11 PROBLEM — M79.10 MUSCLE PAIN: Status: ACTIVE | Noted: 2024-07-11

## 2024-08-27 ENCOUNTER — APPOINTMENT (OUTPATIENT)
Dept: CARDIOLOGY | Facility: CLINIC | Age: 59
End: 2024-08-27
Payer: MEDICAID

## 2024-08-27 VITALS
HEIGHT: 73 IN | BODY MASS INDEX: 29.42 KG/M2 | SYSTOLIC BLOOD PRESSURE: 128 MMHG | HEART RATE: 83 BPM | WEIGHT: 222 LBS | DIASTOLIC BLOOD PRESSURE: 88 MMHG

## 2024-08-27 DIAGNOSIS — I10 PRIMARY HYPERTENSION: ICD-10-CM

## 2024-08-27 DIAGNOSIS — N52.01 ERECTILE DYSFUNCTION DUE TO ARTERIAL INSUFFICIENCY: ICD-10-CM

## 2024-08-27 DIAGNOSIS — I63.9 CEREBROVASCULAR ACCIDENT (CVA), UNSPECIFIED MECHANISM (MULTI): ICD-10-CM

## 2024-08-27 DIAGNOSIS — R09.89 BILATERAL CAROTID BRUITS: ICD-10-CM

## 2024-08-27 DIAGNOSIS — R07.9 CHEST PAIN, UNSPECIFIED TYPE: ICD-10-CM

## 2024-08-27 DIAGNOSIS — R06.09 DOE (DYSPNEA ON EXERTION): ICD-10-CM

## 2024-08-27 DIAGNOSIS — E11.59 TYPE 2 DIABETES MELLITUS WITH CARDIAC COMPLICATION (MULTI): ICD-10-CM

## 2024-08-27 DIAGNOSIS — M79.602 PAIN OF LEFT UPPER EXTREMITY: ICD-10-CM

## 2024-08-27 DIAGNOSIS — I25.118 CORONARY ARTERY DISEASE OF NATIVE ARTERY OF NATIVE HEART WITH STABLE ANGINA PECTORIS (CMS-HCC): ICD-10-CM

## 2024-08-27 DIAGNOSIS — M12.812 ROTATOR CUFF ARTHROPATHY, LEFT: ICD-10-CM

## 2024-08-27 DIAGNOSIS — M54.10 RADICULAR PAIN: ICD-10-CM

## 2024-08-27 DIAGNOSIS — E03.9 ACQUIRED HYPOTHYROIDISM: ICD-10-CM

## 2024-08-27 DIAGNOSIS — I25.2 HISTORY OF ACUTE ANTERIOR WALL MI: ICD-10-CM

## 2024-08-27 DIAGNOSIS — R73.9 HYPERGLYCEMIA: ICD-10-CM

## 2024-08-27 DIAGNOSIS — E07.9 THYROID DISEASE: ICD-10-CM

## 2024-08-27 DIAGNOSIS — J44.9 CHRONIC OBSTRUCTIVE PULMONARY DISEASE, UNSPECIFIED COPD TYPE (MULTI): ICD-10-CM

## 2024-08-27 DIAGNOSIS — Z82.49 FAMILY HISTORY OF ISCHEMIC HEART DISEASE: ICD-10-CM

## 2024-08-27 DIAGNOSIS — R06.83 SNORES: ICD-10-CM

## 2024-08-27 DIAGNOSIS — Z72.0 TOBACCO ABUSE: ICD-10-CM

## 2024-08-27 DIAGNOSIS — G47.33 OSA (OBSTRUCTIVE SLEEP APNEA): ICD-10-CM

## 2024-08-27 DIAGNOSIS — E78.2 MIXED HYPERLIPIDEMIA: ICD-10-CM

## 2024-08-27 DIAGNOSIS — R00.2 PALPITATIONS: ICD-10-CM

## 2024-08-27 RX ORDER — LISINOPRIL 20 MG/1
20 TABLET ORAL DAILY
Qty: 90 TABLET | Refills: 1 | Status: SHIPPED | OUTPATIENT
Start: 2024-08-27

## 2024-08-27 RX ORDER — CLOPIDOGREL BISULFATE 75 MG/1
75 TABLET ORAL DAILY
Qty: 90 TABLET | Refills: 1 | Status: SHIPPED | OUTPATIENT
Start: 2024-08-27

## 2024-08-27 RX ORDER — ASPIRIN 81 MG/1
81 TABLET ORAL
Qty: 90 TABLET | Refills: 1 | Status: SHIPPED | OUTPATIENT
Start: 2024-08-27

## 2024-08-27 RX ORDER — ATORVASTATIN CALCIUM 80 MG/1
80 TABLET, FILM COATED ORAL DAILY
Qty: 90 TABLET | Refills: 1 | Status: SHIPPED | OUTPATIENT
Start: 2024-08-27

## 2024-08-27 RX ORDER — METOPROLOL SUCCINATE 25 MG/1
25 TABLET, EXTENDED RELEASE ORAL DAILY
Qty: 90 TABLET | Refills: 1 | Status: SHIPPED | OUTPATIENT
Start: 2024-08-27

## 2024-08-27 NOTE — PATIENT INSTRUCTIONS
PLEASE BRING ALL MEDICATION BOTTLES TO OFFICE VISITS.  STAY WELL HYDRATED.     HAVE LABS DONE BEFORE NEXT OFFICE VISIT. (FASTING LABS)

## 2024-08-27 NOTE — PROGRESS NOTES
CARDIOLOGY OFFICE NOTE     Date:   8/27/2024    Patient:    Liban Jaramillo    YOB: 1965    Primary Physician: Joselin Simon PA-C       Reason for Visit: 6-month cardiology follow-up.    HPI:     Liban Jaramillo was seen in cardiac evaluation at the  Cardiology office August 27, 2024.      The patients problems are listed as in the impression below.    Electronic medical records reviewed.    Patient returns.  Overall he states that he is doing well.  He is currently incarcerated in the [facility].  He is accompanied by guard and is in handcuffs.    This very pleasant he is states that he is doing okay.  He does have some dyspnea on exertion.  He has had some mid sternal discomfort which she describes more as a clicking sound when he bends forward or stretches.  He is at the lower end of his bypass incision.    He has no other significant cardiovascular complaints.  He states that his vision is normalized.    Patient denies Lightheadedness, Dizziness, TIA or CVA symptoms.  No CHF or Edema.  No Palpitations.  No GI,  or Bleeding Issues. No Recent Fever or Chills.     Cardiovascular and general review of systems is otherwise negative.    A 14-system review is otherwise negative, other than noted.     PHYSICAL EXAMINATION:      Vitals:    08/27/24 0830   BP: 128/88   Pulse: 83     General: No acute distress. Alert and oriented.  Head And Neck Examination: No jugular venous distention, 2/6 bilateral carotid bruits, no mass. Carotid upstrokes preserved. Oral mucosa moist.  No xanthelasma. Head and neck examination otherwise unremarkable.  Lungs: Clear to auscultation and percussion. No wheezes, no rales,  and no rhonchi.  Chest: Excursion appeared to be normal. No chest wall tenderness on palpation.  Postoperative incisions.  Heart: Normal S1 and S2. No S3. No S4. No rub. Grade 1/6 systolic murmur, best heard at the left sternal border. Point of maximal impulse was within normal limits.  Abdomen: Soft.  Nontender. No organomegaly. No bruits. No masses. Obese.  Extremities: No bipedal edema. No clubbing. No cyanosis.  Pulses are strong throughout. No bruits.  Musculoskeletal Exam: No ulcers, otherwise unremarkable.  Neuro: Neurologically appeared grossly intact, no double vision at distance.  .  IMPRESSION:       Cardiovascular status stable  Dyspnea on exertion  Chest pain, atypical, musculoskeletal / incisional.  Abnormal ECG concerning for prior MI  Coronary artery disease, post cardiac catheterization 11/2023 (90% LAD, 95% left circumflex, 50% left main), post three-vessel bypass 12/2023 with LIMA to LAD, RFA to OM, SVG to PDA.  Normal LV systolic function, LVEF 60%.  Hyperdynamic left ventricular function by echocardiogram 11/2023.  Aortic valve sclerosis without stenosis  Hypertension  Hyperlipidemia  Hypothyroidism recently diagnosed  Hyperglycemic  Postoperative pontine stroke 12/2023  Cervical disc disease with radiculopathy.  Carotid bruits.  Negative carotid ultrasound 11/2023  Degenerative joint disease with left rotator cuff disease.  Alcohol history.  Tobacco history  Family history of coronary artery disease  Otherwise as per assessment below.     RECOMMENDATIONS:      Patient symptoms do not appear to be significant at this time.  He is doing well overall.  Would suggest that he continue his current medications.  Refills were provided.    Exercise dietary program was encouraged.    Hydration.    [employer] portal use was encouraged.    We will plan to see back in 6 months with Laboratory Studies and ECG as ordered.     Patient will follow up with their primary physician for general care.    The patient knows to contact medical care earlier if need be.      ALLERGIES:     Allergies   Allergen Reactions    Penicillins Anaphylaxis, Hives, Rash and Swelling        MEDICATIONS:     Current Outpatient Medications   Medication Instructions    Advair HFA 45-21 mcg/actuation inhaler 2 puffs, inhalation, 2 times  daily PRN, States he uses it PRN     albuterol 90 mcg/actuation inhaler 1-2 puffs, inhalation, Every 6 hours PRN    aspirin 81 mg, oral, Daily before breakfast    atorvastatin (LIPITOR) 80 mg, oral, Daily    clopidogrel (PLAVIX) 75 mg, oral, Daily    codeine-guaifenesin (Robitussin-AC)  mg/5 mL syrup 5 mL, oral, 4 times daily    FreeStyle glucose monitoring kit 1 each, miscellaneous, 3 times daily PRN    gabapentin (NEURONTIN) 300 mg, oral, Every 8 hours scheduled    levothyroxine (SYNTHROID, LEVOXYL) 88 mcg, oral, Daily    lisinopril 20 mg, oral, Daily    metoprolol succinate XL (TOPROL-XL) 25 mg, oral, Daily    nitroglycerin (NITROSTAT) 0.4 mg, sublingual, Every 5 min PRN, UP TO 3 TIMES IF NO RELIEF CALL 911    potassium chloride CR (Klor-Con) 10 mEq ER tablet 10 mEq, oral, Daily, Take 1 tablet daily while taking Furosemide (x5 days). First dose 12/12. Do not crush, chew, or split.    True Metrix Glucose Test Strip strip 3 times daily    Unile[employer] GP Lancet misc USE AS DIRECTED THREE TIMES A DAY       ELECTROCARDIOGRAM:      None this visit    CARDIAC TESTING:      None this visit    LABORATORY DATA:      None this visit          PROBLEM LIST:     Patient Active Problem List   Diagnosis    Hypothyroidism    Hypertension    Abnormal EKG    Family history of ischemic heart disease    SMALLWOOD (dyspnea on exertion)    Pain of left upper extremity    Tobacco abuse    Neck pain    Radicular pain    Rotator cuff arthropathy, left    Snores    ANA (obstructive sleep apnea)    Bilateral carotid bruits    Peyronie disease    Erectile dysfunction    Coronary artery disease of native artery of native heart with stable angina pectoris (CMS-[facility])    Type 2 diabetes mellitus with cardiac complication (Multi)    COPD (chronic obstructive pulmonary disease) (Multi)    Apnea    Candidiasis of vagina    Cerebrovascular accident (CVA) (Multi)    Erectile dysfunction due to arterial insufficiency    Muscle pain    Nicotine dependence              Natanael Wallace MD, [facility] / [facility] /  Cardiology      Of Note:  [employer] voice recognition dictation software was utilized partially in the preparation of this note, therefore, inaccuracies in spelling, word choice and punctuation may have occurred which were not recognized the time of signing.    Patient was seen and examined with total time of visit including chart preparation, rooming, and chart completion exceeding 40 minutes.      ----

## 2024-10-30 DIAGNOSIS — F17.210 CIGARETTE NICOTINE DEPENDENCE WITHOUT COMPLICATION: ICD-10-CM

## 2024-11-08 ENCOUNTER — APPOINTMENT (OUTPATIENT)
Dept: PRIMARY CARE | Facility: CLINIC | Age: 59
End: 2024-11-08
Payer: MEDICAID

## 2024-11-12 ENCOUNTER — APPOINTMENT (OUTPATIENT)
Dept: PRIMARY CARE | Facility: CLINIC | Age: 59
End: 2024-11-12
Payer: COMMERCIAL

## 2024-11-12 DIAGNOSIS — R94.31 ABNORMAL EKG: ICD-10-CM

## 2024-11-12 DIAGNOSIS — R07.9 CHEST PAIN, UNSPECIFIED TYPE: ICD-10-CM

## 2024-11-12 DIAGNOSIS — R06.09 DOE (DYSPNEA ON EXERTION): ICD-10-CM

## 2024-11-12 DIAGNOSIS — I25.10 CORONARY ARTERY DISEASE INVOLVING NATIVE CORONARY ARTERY OF NATIVE HEART WITHOUT ANGINA PECTORIS: ICD-10-CM

## 2024-11-12 DIAGNOSIS — Z72.0 TOBACCO ABUSE: ICD-10-CM

## 2024-11-12 DIAGNOSIS — E07.9 THYROID DISEASE: ICD-10-CM

## 2024-11-12 DIAGNOSIS — Z82.49 FAMILY HISTORY OF ISCHEMIC HEART DISEASE: ICD-10-CM

## 2024-11-12 DIAGNOSIS — R00.2 PALPITATIONS: ICD-10-CM

## 2024-11-12 DIAGNOSIS — M54.10 RADICULAR PAIN: ICD-10-CM

## 2024-11-12 DIAGNOSIS — E03.9 HYPOTHYROIDISM, UNSPECIFIED TYPE: ICD-10-CM

## 2024-11-12 DIAGNOSIS — E03.9 ACQUIRED HYPOTHYROIDISM: ICD-10-CM

## 2024-11-12 DIAGNOSIS — I25.118 CORONARY ARTERY DISEASE OF NATIVE ARTERY OF NATIVE HEART WITH STABLE ANGINA PECTORIS: ICD-10-CM

## 2024-11-12 DIAGNOSIS — N52.01 ERECTILE DYSFUNCTION DUE TO ARTERIAL INSUFFICIENCY: ICD-10-CM

## 2024-11-12 DIAGNOSIS — G47.33 OSA (OBSTRUCTIVE SLEEP APNEA): ICD-10-CM

## 2024-11-12 DIAGNOSIS — E11.59 TYPE 2 DIABETES MELLITUS WITH CARDIAC COMPLICATION: ICD-10-CM

## 2024-11-12 DIAGNOSIS — J44.9 CHRONIC OBSTRUCTIVE PULMONARY DISEASE, UNSPECIFIED COPD TYPE (MULTI): ICD-10-CM

## 2024-11-12 DIAGNOSIS — I10 PRIMARY HYPERTENSION: ICD-10-CM

## 2024-11-12 DIAGNOSIS — I63.9 CEREBROVASCULAR ACCIDENT (CVA), UNSPECIFIED MECHANISM (MULTI): ICD-10-CM

## 2024-11-12 DIAGNOSIS — R73.9 HYPERGLYCEMIA: ICD-10-CM

## 2024-11-12 DIAGNOSIS — R09.89 BILATERAL CAROTID BRUITS: ICD-10-CM

## 2024-11-12 DIAGNOSIS — M79.602 PAIN OF LEFT UPPER EXTREMITY: ICD-10-CM

## 2024-11-12 DIAGNOSIS — R06.83 SNORES: ICD-10-CM

## 2024-11-12 DIAGNOSIS — M12.812 ROTATOR CUFF ARTHROPATHY, LEFT: ICD-10-CM

## 2024-11-12 DIAGNOSIS — I25.2 HISTORY OF ACUTE ANTERIOR WALL MI: ICD-10-CM

## 2024-11-12 DIAGNOSIS — E78.2 MIXED HYPERLIPIDEMIA: ICD-10-CM

## 2024-11-12 RX ORDER — FLUTICASONE PROPIONATE AND SALMETEROL XINAFOATE 45; 21 UG/1; UG/1
2 AEROSOL, METERED RESPIRATORY (INHALATION)
Qty: 12 G | Refills: 0 | Status: SHIPPED | OUTPATIENT
Start: 2024-11-12

## 2024-11-12 RX ORDER — LEVOTHYROXINE SODIUM 88 UG/1
88 TABLET ORAL DAILY
Qty: 30 TABLET | Refills: 0 | Status: SHIPPED | OUTPATIENT
Start: 2024-11-12

## 2024-11-12 RX ORDER — NITROGLYCERIN 0.4 MG/1
0.4 TABLET SUBLINGUAL EVERY 5 MIN PRN
Qty: 30 TABLET | Refills: 3 | Status: SHIPPED | OUTPATIENT
Start: 2024-11-12 | End: 2025-11-12

## 2024-11-12 RX ORDER — LISINOPRIL 20 MG/1
20 TABLET ORAL DAILY
Qty: 30 TABLET | Refills: 0 | Status: SHIPPED | OUTPATIENT
Start: 2024-11-12

## 2024-11-12 RX ORDER — METOPROLOL SUCCINATE 25 MG/1
25 TABLET, EXTENDED RELEASE ORAL DAILY
Qty: 30 TABLET | Refills: 0 | Status: SHIPPED | OUTPATIENT
Start: 2024-11-12 | End: 2024-12-12

## 2024-11-12 RX ORDER — CLOPIDOGREL BISULFATE 75 MG/1
75 TABLET ORAL DAILY
Qty: 30 TABLET | Refills: 0 | Status: SHIPPED | OUTPATIENT
Start: 2024-11-12

## 2024-11-12 RX ORDER — ATORVASTATIN CALCIUM 80 MG/1
80 TABLET, FILM COATED ORAL DAILY
Qty: 30 TABLET | Refills: 0 | Status: SHIPPED | OUTPATIENT
Start: 2024-11-12

## 2024-11-12 NOTE — TELEPHONE ENCOUNTER
Left message on the voicemail of cardiology requesting they outreach patient for a sooner appointment - patient reports bilateral feet swelling - currently scheduled to see Dr. Wallace 02-

## 2024-11-19 ENCOUNTER — TELEPHONE (OUTPATIENT)
Dept: PRIMARY CARE | Facility: CLINIC | Age: 59
End: 2024-11-19
Payer: COMMERCIAL

## 2024-11-19 NOTE — TELEPHONE ENCOUNTER
Liban Jaramillo    1965    Patient is scheduled for a CT Lung Screening 36774.  [facility] requires clinical documents to be submitted.  There are none in the chart.  A P2P may be completed at [phone] and the tracking number is [id number].  Please let me know if this will be completed or not.    Thank you,    Sarah Solis   [employer]  [email or web address]  [phone] phone and  fax [phone]

## 2024-11-27 ENCOUNTER — APPOINTMENT (OUTPATIENT)
Facility: HOSPITAL | Age: 59
End: 2024-11-27
Payer: COMMERCIAL

## 2024-12-03 ENCOUNTER — APPOINTMENT (OUTPATIENT)
Dept: PRIMARY CARE | Facility: CLINIC | Age: 59
End: 2024-12-03
Payer: COMMERCIAL

## 2025-01-13 ENCOUNTER — APPOINTMENT (OUTPATIENT)
Dept: PRIMARY CARE | Facility: CLINIC | Age: 60
End: 2025-01-13
Payer: COMMERCIAL

## 2025-01-13 ENCOUNTER — DOCUMENTATION (OUTPATIENT)
Dept: CARDIOLOGY | Facility: HOSPITAL | Age: 60
End: 2025-01-13

## 2025-01-13 VITALS
HEART RATE: 81 BPM | WEIGHT: 238.9 LBS | OXYGEN SATURATION: 97 % | DIASTOLIC BLOOD PRESSURE: 88 MMHG | BODY MASS INDEX: 31.66 KG/M2 | TEMPERATURE: 93.4 F | SYSTOLIC BLOOD PRESSURE: 146 MMHG | HEIGHT: 73 IN

## 2025-01-13 DIAGNOSIS — E55.9 VITAMIN D DEFICIENCY: ICD-10-CM

## 2025-01-13 DIAGNOSIS — Z12.5 SCREENING FOR PROSTATE CANCER: ICD-10-CM

## 2025-01-13 DIAGNOSIS — J90 PLEURISY WITH PLEURAL EFFUSION: ICD-10-CM

## 2025-01-13 DIAGNOSIS — I10 PRIMARY HYPERTENSION: Primary | ICD-10-CM

## 2025-01-13 DIAGNOSIS — E03.9 HYPOTHYROIDISM, UNSPECIFIED TYPE: ICD-10-CM

## 2025-01-13 DIAGNOSIS — R06.09 DOE (DYSPNEA ON EXERTION): ICD-10-CM

## 2025-01-13 DIAGNOSIS — G47.30 SLEEP APNEA, UNSPECIFIED TYPE: ICD-10-CM

## 2025-01-13 DIAGNOSIS — I25.118 CORONARY ARTERY DISEASE OF NATIVE ARTERY OF NATIVE HEART WITH STABLE ANGINA PECTORIS: ICD-10-CM

## 2025-01-13 DIAGNOSIS — J44.9 CHRONIC OBSTRUCTIVE PULMONARY DISEASE, UNSPECIFIED COPD TYPE (MULTI): ICD-10-CM

## 2025-01-13 DIAGNOSIS — E11.59 TYPE 2 DIABETES MELLITUS WITH CARDIAC COMPLICATION: ICD-10-CM

## 2025-01-13 DIAGNOSIS — E78.2 MIXED HYPERLIPIDEMIA: ICD-10-CM

## 2025-01-13 DIAGNOSIS — G25.81 RESTLESS LEG: ICD-10-CM

## 2025-01-13 RX ORDER — METOPROLOL SUCCINATE 25 MG/1
25 TABLET, EXTENDED RELEASE ORAL DAILY
Qty: 30 TABLET | Refills: 2 | Status: SHIPPED | OUTPATIENT
Start: 2025-01-13 | End: 2025-04-13

## 2025-01-13 RX ORDER — ASPIRIN 81 MG/1
81 TABLET ORAL
Qty: 90 TABLET | Refills: 1 | Status: SHIPPED | OUTPATIENT
Start: 2025-01-13

## 2025-01-13 RX ORDER — FLUTICASONE PROPIONATE AND SALMETEROL XINAFOATE 45; 21 UG/1; UG/1
2 AEROSOL, METERED RESPIRATORY (INHALATION)
Qty: 12 G | Refills: 5 | Status: SHIPPED | OUTPATIENT
Start: 2025-01-13

## 2025-01-13 RX ORDER — CLOPIDOGREL BISULFATE 75 MG/1
75 TABLET ORAL DAILY
Qty: 30 TABLET | Refills: 2 | Status: SHIPPED | OUTPATIENT
Start: 2025-01-13

## 2025-01-13 RX ORDER — ALBUTEROL SULFATE 90 UG/1
1-2 INHALANT RESPIRATORY (INHALATION) EVERY 6 HOURS PRN
Qty: 18 G | Refills: 5 | Status: SHIPPED | OUTPATIENT
Start: 2025-01-13 | End: 2025-02-12

## 2025-01-13 RX ORDER — ATORVASTATIN CALCIUM 80 MG/1
80 TABLET, FILM COATED ORAL DAILY
Qty: 30 TABLET | Refills: 2 | Status: SHIPPED | OUTPATIENT
Start: 2025-01-13

## 2025-01-13 RX ORDER — LISINOPRIL 20 MG/1
20 TABLET ORAL DAILY
Qty: 30 TABLET | Refills: 2 | Status: SHIPPED | OUTPATIENT
Start: 2025-01-13

## 2025-01-13 RX ORDER — ROPINIROLE 0.5 MG/1
0.5 TABLET, FILM COATED ORAL 3 TIMES DAILY
Qty: 90 TABLET | Refills: 1 | Status: SHIPPED | OUTPATIENT
Start: 2025-01-13 | End: 2026-01-13

## 2025-01-13 ASSESSMENT — PATIENT HEALTH QUESTIONNAIRE - PHQ9
1. LITTLE INTEREST OR PLEASURE IN DOING THINGS: NOT AT ALL
2. FEELING DOWN, DEPRESSED OR HOPELESS: NOT AT ALL
SUM OF ALL RESPONSES TO PHQ9 QUESTIONS 1 AND 2: 0

## 2025-01-13 NOTE — PROGRESS NOTES
"Subjective   Patient ID: Liban Jaramillo is a 59 y.o. male who presents for Follow-up.    HPI     Pt would like to discuss restless leg syndrome, blood work and medications.   Pt would like to also discuss sleeping issues unable to sleep, he states he only gets 3 hrs of sleep would like to get referral for sleep test.   Pt has noticed that \"legs go crazy\" x yrs with recent worsening. After the triple bipass it's gotten worse.   Max 3 hrs of sleep a night.   He has trouble falling and staying asleep.  Legs shift and move all night b/c of inability to sit still.   He has not tried OTC meds tried.     He also has snoring issues and witnessed apneas by wife x months. Chronic daytime sleepiness x yrs    H/o HTN- stopped all meds due to intermediate time.   Saw [facility] once while in intermediate. Needs a new visit.   Triple bipass 12/23. Not currently on plavix. Needs to restart all meds    Diabetic- not treated x yrs. Overdue for labs. + polydipsia      COPD- smoker, smokes 1 ppd. Needs refill of inhalers.     Pt reports not seeing a neuro since 2023 stroke, overdue for f/up    Pt reports not have been taking his medication he was incarcerated 6 months for drug related stuff.       Review of Systems  Constitutional: Patient denies any fever, chills, loss of appetite, or unexplained weight loss.  Cardiovascular: Patient denies any chest pain, shortness of breath with exertion, tachycardia, palpitations, orthopnea, or paroxysmal nocturnal dyspnea.  Respiratory: Patient denies any cough, shortness breath, or wheezing.  Gastrointestinal patient denies any nausea, vomiting, diarrhea, constipation, melena, hematochezia, or reflux symptoms  Skin: Denies any rashes or skin lesions   Neurology: Patient denies any new motor or sensory losses.  Denies any numbness, tingling, weakness, and incoordination of the extremities.  Patient also denies any tremor, seizures, or gait instability.  Endocrinology: Denies any polyuria, " "polydipsia, polyphagia, or heat/cold intolerance.    Objective   /88   Pulse 81   Temp 34.1 °C (93.4 °F) (Temporal)   Ht 1.854 m (6' 1\")   Wt 108 kg (238 lb 14.4 oz)   SpO2 97%   BMI 31.52 kg/m²     Physical Exam  Gen. appearance: Alert and cooperative, in no acute distress, well-developed, well-nourished obese male.  MP score of IV  Neck: Supple and without adenopathy or rigidity.  There is no JVD at 90° and no carotid bruits are noted.  There is no thyromegaly, thyroid tenderness, or palpable thyroid nodules.  Heart: Regular rate and rhythm without murmur or ectopy.  Lungs: Lungs are clear to auscultation bilaterally with good air exchange.  Skin: Good turgor, moist, warm and without rashes or lesions.  Neurological exam: Alert and oriented ×3, no tremor, normal gait.  Extremities: No clubbing, cyanosis, or edema    Assessment/Plan   Diagnoses and all orders for this visit:  Primary hypertension  -     lisinopril 20 mg tablet; Take 1 tablet (20 mg) by mouth once daily.  -     metoprolol succinate XL (Toprol-XL) 25 mg 24 hr tablet; Take 1 tablet (25 mg) by mouth once daily.  -     CBC and Auto Differential; Future  Will restart his lisinopril and metoprolol.  His blood pressure today in the office was not bad but he does need to restart both to help with cardioprotection and kidney protection.  He understands that he should take half a tablet daily for 2 weeks and then increase to a full tablet so that this does not \"shock his system\"    Mixed hyperlipidemia  -     atorvastatin (Lipitor) 80 mg tablet; Take 1 tablet (80 mg) by mouth once daily.  -     Comprehensive Metabolic Panel; Future  -     Lipid Panel; Future  Patient is to restart his Lipitor 80 mg once daily.  Repeat labs were ordered and we will call him with that result as soon as they are reviewed.    Type 2 diabetes mellitus with cardiac complication  -     Hemoglobin A1C; Future  -     Follow Up In [facility] - PCP - Established; " Future  Patient is overdue for diabetic evaluation.  He is currently unmedicated and admits to polydipsia.  An A1c was ordered and again, we will decide on treatment once his labs have been reviewed.    Coronary artery disease of native artery of native heart with stable angina pectoris  -     clopidogrel (Plavix) 75 mg tablet; Take 1 tablet (75 mg) by mouth once daily.  -     aspirin 81 mg EC tablet; Take 1 tablet (81 mg) by mouth once daily in the morning. Take before meals.  Patient will restart Plavix 75 mg daily, 81 mg of aspirin and have a follow-up appointment with his cardiologist, Dr. Rodriguez as soon as possible.  He has been untreated with Plavix because it was too expensive while he was incarcerated.    Sleep apnea, unspecified type  -     In-Center Sleep Study (Non-Sleep Provider Only); Future  Has witnessed apneas and patient has excessive daytime sleepiness times years.  Sleep study was ordered and he should expect a phone call from them for scheduling within the next week.      Chronic obstructive pulmonary disease, unspecified COPD type (Multi)  -     Advair HFA 45-21 mcg/actuation inhaler; Inhale 2 puffs 2 times a day. States he uses it PRN        -     albuterol 90 mcg/actuation inhaler; Inhale 1-2 puffs every 6 hours if needed for wheezing.  He will restart Advair and albuterol as prescribed.  Smoking cessation was discussed but he is not willing to stop at this point in time.  Patient understands that continued smoking will increase the risks of lung disease, vascular disease, and multiple malignancies.    Hypothyroidism, unspecified type  -     TSH with reflex to Free T4 if abnormal; Future  Patient is currently untreated.  Previous dose was 88 mcg.  We will decide on dosing once his TSH has been reviewed.    Screening for prostate cancer  -     Prostate Specific Antigen, Screen; Future  Pt is currently asymptomatic    Restless leg  -     rOPINIRole (Requip) 0.5 mg tablet; Take 1 tablet (0.5  mg) by mouth 3 times a day.  -     Follow Up In [facility] - PCP - Established; Future  Patient's restless legs not only keep him from sleeping at night but also disturb him throughout the day.  He is to start taking 0.5 Requip 1 in the AM and 2 in the PM and we will decide on increase of dosing after he has been on this for at least 6 weeks.    Vitamin D deficiency  -     Vitamin D 1,25 Dihydroxy (for eval of hypercalcemia); Future  Patient is currently not on supplementation.    Patient is to return to the clinic to see Joselin in 4 weeks after restarting all medication and to review the effectiveness of Requip for RLS.    Patient understands that should they have testing outside   facilities that we may not receive the results and was told to call us if they have not heard from our office within a week after testing.    [facility] uses voice recognition technology for dictations. Sometimes the software misinterprets words. Please take this into account when reading this.

## 2025-01-13 NOTE — PROGRESS NOTES
Spoke with patient today as follow up to the [facility] Trial.  Able to see patient at in person visit and give study ID card.

## 2025-01-17 ENCOUNTER — LAB (OUTPATIENT)
Dept: LAB | Facility: LAB | Age: 60
End: 2025-01-17
Payer: COMMERCIAL

## 2025-01-17 DIAGNOSIS — I10 PRIMARY HYPERTENSION: ICD-10-CM

## 2025-01-17 DIAGNOSIS — E11.59 TYPE 2 DIABETES MELLITUS WITH CARDIAC COMPLICATION: ICD-10-CM

## 2025-01-17 DIAGNOSIS — E55.9 VITAMIN D DEFICIENCY: ICD-10-CM

## 2025-01-17 DIAGNOSIS — Z12.5 SCREENING FOR PROSTATE CANCER: ICD-10-CM

## 2025-01-17 DIAGNOSIS — E03.9 HYPOTHYROIDISM, UNSPECIFIED TYPE: ICD-10-CM

## 2025-01-17 DIAGNOSIS — E78.2 MIXED HYPERLIPIDEMIA: ICD-10-CM

## 2025-01-17 LAB
ALBUMIN SERPL BCP-MCNC: 3.9 G/DL (ref 3.4–5)
ALP SERPL-CCNC: 51 U/L (ref 33–120)
ALT SERPL W P-5'-P-CCNC: 114 U/L (ref 10–52)
ANION GAP SERPL CALC-SCNC: 8 MMOL/L (ref 10–20)
AST SERPL W P-5'-P-CCNC: 82 U/L (ref 9–39)
BASOPHILS # BLD AUTO: 0.05 X10*3/UL (ref 0–0.1)
BASOPHILS NFR BLD AUTO: 0.7 %
BILIRUB SERPL-MCNC: 0.5 MG/DL (ref 0–1.2)
BUN SERPL-MCNC: 10 MG/DL (ref 6–23)
CALCIUM SERPL-MCNC: 8.7 MG/DL (ref 8.6–10.3)
CHLORIDE SERPL-SCNC: 104 MMOL/L (ref 98–107)
CHOLEST SERPL-MCNC: 162 MG/DL (ref 0–199)
CHOLESTEROL/HDL RATIO: 5.2
CO2 SERPL-SCNC: 28 MMOL/L (ref 21–32)
CREAT SERPL-MCNC: 0.89 MG/DL (ref 0.5–1.3)
EGFRCR SERPLBLD CKD-EPI 2021: >90 ML/MIN/1.73M*2
EOSINOPHIL # BLD AUTO: 0.39 X10*3/UL (ref 0–0.7)
EOSINOPHIL NFR BLD AUTO: 5.5 %
ERYTHROCYTE [DISTWIDTH] IN BLOOD BY AUTOMATED COUNT: 14 % (ref 11.5–14.5)
EST. AVERAGE GLUCOSE BLD GHB EST-MCNC: 169 MG/DL
GLUCOSE SERPL-MCNC: 168 MG/DL (ref 74–99)
HBA1C MFR BLD: 7.5 %
HCT VFR BLD AUTO: 43.1 % (ref 41–52)
HDLC SERPL-MCNC: 31.4 MG/DL
HGB BLD-MCNC: 14.4 G/DL (ref 13.5–17.5)
IMM GRANULOCYTES # BLD AUTO: 0.02 X10*3/UL (ref 0–0.7)
IMM GRANULOCYTES NFR BLD AUTO: 0.3 % (ref 0–0.9)
LDLC SERPL CALC-MCNC: 102 MG/DL
LYMPHOCYTES # BLD AUTO: 2.12 X10*3/UL (ref 1.2–4.8)
LYMPHOCYTES NFR BLD AUTO: 30.2 %
MCH RBC QN AUTO: 29.9 PG (ref 26–34)
MCHC RBC AUTO-ENTMCNC: 33.4 G/DL (ref 32–36)
MCV RBC AUTO: 90 FL (ref 80–100)
MONOCYTES # BLD AUTO: 0.77 X10*3/UL (ref 0.1–1)
MONOCYTES NFR BLD AUTO: 11 %
NEUTROPHILS # BLD AUTO: 3.68 X10*3/UL (ref 1.2–7.7)
NEUTROPHILS NFR BLD AUTO: 52.3 %
NON HDL CHOLESTEROL: 131 MG/DL (ref 0–149)
NRBC BLD-RTO: 0 /100 WBCS (ref 0–0)
PLATELET # BLD AUTO: 219 X10*3/UL (ref 150–450)
POTASSIUM SERPL-SCNC: 4.3 MMOL/L (ref 3.5–5.3)
PROT SERPL-MCNC: 6.2 G/DL (ref 6.4–8.2)
PSA SERPL-MCNC: 0.59 NG/ML
RBC # BLD AUTO: 4.81 X10*6/UL (ref 4.5–5.9)
SODIUM SERPL-SCNC: 136 MMOL/L (ref 136–145)
T4 FREE SERPL-MCNC: 0.44 NG/DL (ref 0.61–1.12)
TRIGL SERPL-MCNC: 142 MG/DL (ref 0–149)
TSH SERPL-ACNC: 34.22 MIU/L (ref 0.44–3.98)
VLDL: 28 MG/DL (ref 0–40)
WBC # BLD AUTO: 7 X10*3/UL (ref 4.4–11.3)

## 2025-01-17 PROCEDURE — 82652 VIT D 1 25-DIHYDROXY: CPT

## 2025-01-17 PROCEDURE — 85025 COMPLETE CBC W/AUTO DIFF WBC: CPT

## 2025-01-17 PROCEDURE — 36415 COLL VENOUS BLD VENIPUNCTURE: CPT

## 2025-01-17 PROCEDURE — 84443 ASSAY THYROID STIM HORMONE: CPT

## 2025-01-17 PROCEDURE — 80053 COMPREHEN METABOLIC PANEL: CPT

## 2025-01-17 PROCEDURE — 80061 LIPID PANEL: CPT

## 2025-01-17 PROCEDURE — 84439 ASSAY OF FREE THYROXINE: CPT

## 2025-01-17 PROCEDURE — 83036 HEMOGLOBIN GLYCOSYLATED A1C: CPT

## 2025-01-17 PROCEDURE — 84153 ASSAY OF PSA TOTAL: CPT

## 2025-01-20 ENCOUNTER — PATIENT MESSAGE (OUTPATIENT)
Dept: PRIMARY CARE | Facility: CLINIC | Age: 60
End: 2025-01-20
Payer: COMMERCIAL

## 2025-01-20 DIAGNOSIS — E03.9 HYPOTHYROIDISM, UNSPECIFIED TYPE: ICD-10-CM

## 2025-01-20 LAB — 1,25(OH)2D SERPL-MCNC: 46.9 PG/ML (ref 19.9–79.3)

## 2025-01-20 RX ORDER — LEVOTHYROXINE SODIUM 100 UG/1
100 TABLET ORAL DAILY
Qty: 30 TABLET | Refills: 2 | Status: SHIPPED | OUTPATIENT
Start: 2025-01-20 | End: 2025-04-20

## 2025-02-13 ENCOUNTER — APPOINTMENT (OUTPATIENT)
Dept: PRIMARY CARE | Facility: CLINIC | Age: 60
End: 2025-02-13
Payer: COMMERCIAL

## 2025-02-28 ENCOUNTER — APPOINTMENT (OUTPATIENT)
Dept: CARDIOLOGY | Facility: CLINIC | Age: 60
End: 2025-02-28
Payer: MEDICAID

## 2025-03-13 ENCOUNTER — APPOINTMENT (OUTPATIENT)
Dept: UROLOGY | Facility: CLINIC | Age: 60
End: 2025-03-13
Payer: COMMERCIAL

## 2025-04-21 ENCOUNTER — APPOINTMENT (OUTPATIENT)
Dept: PRIMARY CARE | Facility: CLINIC | Age: 60
End: 2025-04-21
Payer: COMMERCIAL

## 2025-04-21 VITALS
DIASTOLIC BLOOD PRESSURE: 96 MMHG | BODY MASS INDEX: 30.35 KG/M2 | OXYGEN SATURATION: 97 % | TEMPERATURE: 97.7 F | WEIGHT: 229 LBS | HEIGHT: 73 IN | HEART RATE: 100 BPM | RESPIRATION RATE: 18 BRPM | SYSTOLIC BLOOD PRESSURE: 140 MMHG

## 2025-04-21 DIAGNOSIS — J44.9 CHRONIC OBSTRUCTIVE PULMONARY DISEASE, UNSPECIFIED COPD TYPE (MULTI): ICD-10-CM

## 2025-04-21 DIAGNOSIS — R06.09 DOE (DYSPNEA ON EXERTION): ICD-10-CM

## 2025-04-21 DIAGNOSIS — I63.40 CEREBROVASCULAR ACCIDENT (CVA) DUE TO EMBOLISM OF CEREBRAL ARTERY (MULTI): ICD-10-CM

## 2025-04-21 DIAGNOSIS — E03.9 HYPOTHYROIDISM, UNSPECIFIED TYPE: ICD-10-CM

## 2025-04-21 DIAGNOSIS — G25.81 RESTLESS LEG: ICD-10-CM

## 2025-04-21 DIAGNOSIS — F17.210 CIGARETTE SMOKER: ICD-10-CM

## 2025-04-21 DIAGNOSIS — Z91.148 NON COMPLIANCE W MEDICATION REGIMEN: ICD-10-CM

## 2025-04-21 DIAGNOSIS — R09.89 BILATERAL CAROTID BRUITS: ICD-10-CM

## 2025-04-21 DIAGNOSIS — R51.9 ACUTE NONINTRACTABLE HEADACHE, UNSPECIFIED HEADACHE TYPE: ICD-10-CM

## 2025-04-21 DIAGNOSIS — I25.118 CORONARY ARTERY DISEASE OF NATIVE ARTERY OF NATIVE HEART WITH STABLE ANGINA PECTORIS: ICD-10-CM

## 2025-04-21 DIAGNOSIS — R41.3 MEMORY IMPAIRMENT: ICD-10-CM

## 2025-04-21 DIAGNOSIS — J90 PLEURISY WITH PLEURAL EFFUSION: ICD-10-CM

## 2025-04-21 DIAGNOSIS — Z86.73 HISTORY OF STROKE: ICD-10-CM

## 2025-04-21 DIAGNOSIS — E78.2 MIXED HYPERLIPIDEMIA: ICD-10-CM

## 2025-04-21 DIAGNOSIS — E11.59 TYPE 2 DIABETES MELLITUS WITH CARDIAC COMPLICATION: ICD-10-CM

## 2025-04-21 DIAGNOSIS — Z00.00 ANNUAL PHYSICAL EXAM: ICD-10-CM

## 2025-04-21 DIAGNOSIS — I10 PRIMARY HYPERTENSION: ICD-10-CM

## 2025-04-21 DIAGNOSIS — K75.9 HEPATITIS: Primary | ICD-10-CM

## 2025-04-21 DIAGNOSIS — R42 LIGHTHEADED: ICD-10-CM

## 2025-04-21 DIAGNOSIS — E11.59 TYPE 2 DIABETES MELLITUS WITH OTHER CIRCULATORY COMPLICATION, WITHOUT LONG-TERM CURRENT USE OF INSULIN: ICD-10-CM

## 2025-04-21 PROBLEM — M54.2 NECK PAIN: Status: RESOLVED | Noted: 2023-10-25 | Resolved: 2025-04-21

## 2025-04-21 PROBLEM — N52.9 ERECTILE DYSFUNCTION: Status: RESOLVED | Noted: 2023-11-02 | Resolved: 2025-04-21

## 2025-04-21 PROBLEM — R06.81 APNEA: Status: RESOLVED | Noted: 2024-07-11 | Resolved: 2025-04-21

## 2025-04-21 PROBLEM — Z72.0 TOBACCO ABUSE: Status: RESOLVED | Noted: 2023-10-25 | Resolved: 2025-04-21

## 2025-04-21 PROBLEM — M54.10 RADICULAR PAIN: Status: RESOLVED | Noted: 2023-10-25 | Resolved: 2025-04-21

## 2025-04-21 PROBLEM — M79.10 MUSCLE PAIN: Status: RESOLVED | Noted: 2024-07-11 | Resolved: 2025-04-21

## 2025-04-21 PROBLEM — B37.31 CANDIDIASIS OF VAGINA: Status: RESOLVED | Noted: 2024-07-11 | Resolved: 2025-04-21

## 2025-04-21 PROBLEM — M79.602 PAIN OF LEFT UPPER EXTREMITY: Status: RESOLVED | Noted: 2023-10-25 | Resolved: 2025-04-21

## 2025-04-21 PROBLEM — R06.83 SNORES: Status: RESOLVED | Noted: 2023-10-25 | Resolved: 2025-04-21

## 2025-04-21 PROBLEM — R94.31 ABNORMAL EKG: Status: RESOLVED | Noted: 2023-10-25 | Resolved: 2025-04-21

## 2025-04-21 RX ORDER — ATORVASTATIN CALCIUM 80 MG/1
80 TABLET, FILM COATED ORAL DAILY
Qty: 30 TABLET | Refills: 3 | Status: SHIPPED | OUTPATIENT
Start: 2025-04-21

## 2025-04-21 RX ORDER — CLOPIDOGREL BISULFATE 75 MG/1
75 TABLET ORAL DAILY
Qty: 30 TABLET | Refills: 3 | Status: SHIPPED | OUTPATIENT
Start: 2025-04-21

## 2025-04-21 RX ORDER — ALBUTEROL SULFATE 90 UG/1
1-2 INHALANT RESPIRATORY (INHALATION) EVERY 6 HOURS PRN
Qty: 18 G | Refills: 5 | Status: SHIPPED | OUTPATIENT
Start: 2025-04-21 | End: 2025-05-21

## 2025-04-21 RX ORDER — FLUTICASONE PROPIONATE AND SALMETEROL XINAFOATE 45; 21 UG/1; UG/1
2 AEROSOL, METERED RESPIRATORY (INHALATION)
Qty: 12 G | Refills: 5 | Status: CANCELLED | OUTPATIENT
Start: 2025-04-21

## 2025-04-21 RX ORDER — METOPROLOL SUCCINATE 25 MG/1
25 TABLET, EXTENDED RELEASE ORAL DAILY
Qty: 30 TABLET | Refills: 3 | Status: SHIPPED | OUTPATIENT
Start: 2025-04-21

## 2025-04-21 RX ORDER — ROPINIROLE 0.5 MG/1
0.5 TABLET, FILM COATED ORAL 3 TIMES DAILY
Qty: 90 TABLET | Refills: 1 | Status: SHIPPED | OUTPATIENT
Start: 2025-04-21 | End: 2026-04-21

## 2025-04-21 RX ORDER — LISINOPRIL 20 MG/1
20 TABLET ORAL DAILY
Qty: 30 TABLET | Refills: 3 | Status: SHIPPED | OUTPATIENT
Start: 2025-04-21

## 2025-04-21 RX ORDER — SEMAGLUTIDE 1.34 MG/ML
INJECTION, SOLUTION SUBCUTANEOUS
Qty: 3 ML | Refills: 0 | Status: SHIPPED | OUTPATIENT
Start: 2025-04-21 | End: 2025-06-20

## 2025-04-21 RX ORDER — LEVOTHYROXINE SODIUM 100 UG/1
100 TABLET ORAL DAILY
Qty: 30 TABLET | Refills: 3 | Status: SHIPPED | OUTPATIENT
Start: 2025-04-21

## 2025-04-21 RX ORDER — ASPIRIN 81 MG/1
81 TABLET ORAL
Qty: 30 TABLET | Refills: 3 | Status: SHIPPED | OUTPATIENT
Start: 2025-04-21

## 2025-04-21 RX ORDER — TIOTROPIUM BROMIDE 18 UG/1
1 CAPSULE ORAL; RESPIRATORY (INHALATION)
Qty: 30 CAPSULE | Refills: 11 | Status: SHIPPED | OUTPATIENT
Start: 2025-04-21 | End: 2026-04-21

## 2025-04-21 NOTE — ASSESSMENT & PLAN NOTE
PREVENTIVE CARE SCREENING:  - Labs:  UTD   - Cologuard/ Colonoscopy: Due - deferred   Vaccines:   - Shingles Vaccine (start at 50): Due   - Tetanus (q10yrs): UTD  Lifestyle Modification:  - Discussed DIET - encouraged whole food diet with lots of vegetables, legumes, whole grains, fruits, lean meats, avoid processed foods, high saturated fat/greasy foods, sugary foods and drinks, fast foods/dining out.   - Discussed EXERCISE -   Recommended exercise 3-7 days a week - weight training for bone health and cardiovascular exercise.   - Avoid or quit cigarette smoking   - Limit or abstain from alcohol consumption   - Encouraged pt to get yearly eye and dental exams

## 2025-04-21 NOTE — ASSESSMENT & PLAN NOTE
- Pt admits to poor compliance with his medication regimen due to forgetfulness   - Discussed the risk of progression of ASCVD leading to another heart attack or stroked and encouraged better compliance   - Recommended dietary modifications and quit smoking  - Will be adding Ozempic for his DM which should provide some added cardioprotection    - Pt denies chest pain, palpitations, SMALLWOOD, orthopnea, PND   - Encouraged regular follow up with his cardiologist

## 2025-04-21 NOTE — ASSESSMENT & PLAN NOTE
- Pt admits to poor compliance with medication due to forgetfulness   - Encouraged better compliance and discussed some strategies to help remember e.g. pill pack   - Restart levothyroxine and recheck labs 3 months

## 2025-04-21 NOTE — ASSESSMENT & PLAN NOTE
- BP is high today and pt admits to poor compliance with his meds due to forgetfulness   - Reminded the pt of the risks of poorly controlled HTN, particularly the risk of ACVD progression and another heart attack or stroke.   - Encouraged better compliance, use pill pack/ daily organizer  - Close follow up for this is recommended - see me in 2-3 weeks

## 2025-04-21 NOTE — PROGRESS NOTES
"Subjective   Patient ID: Liban Jaramillo is a 59 y.o. male who presents for Follow-up (Pt here today for a follow up; pt's BP is up, balance off, not eating the best, a lot of stress going on, dropping things-worried about that blood clot in his brain previously, not sleeping, forgetfulness. //Wants to discuss ADHD as well; can't stay on task, can't focus. ), Med Refill (Pending ), and Sinus Problem (Constant pressure, stuffed up, nasal passage swelling).    HPI     Preventive:   - Labs: UTD  - Colon CA: Due   - Shingrix: Due   - Td:12/8/22 - next due 12/2032  - Tobacco: smokes 1 ppd   - Alcohol: none     Hypothyroid:   - noncompliant with medicine   - Most recent labs TSH was 34.22, free T4 0.44    HTN   - BP elevated today   - Has been off meds    T2DM  - Has been off meds   - Due for labs   - Dropping things lately, balance has been off, vision has been blurry  - Hasn't been taking his medications  - Space-y and forgetful - his wife sometimes moves his meds and then if he doesn't see it, he doesn't think about it   - \"My thoughts are like a ball of yarn and when I try to grab a strand and follow it, I get lost in the sauce\"   - Can't sleep, gets only 3 hours a night or so   - Missed the sleep study because he slept in   - Wants a pill pack       History of triple bypass - surgery CABG 12/7/23   History of stroke post-op CABG 2023        Active Problem List  Patient Active Problem List   Diagnosis    Hypothyroidism    Primary hypertension    Family history of ischemic heart disease    Rotator cuff arthropathy, left    ANA (obstructive sleep apnea)    Bilateral carotid bruits    Peyronie disease    Coronary artery disease of native artery of native heart with stable angina pectoris    Type 2 diabetes mellitus with cardiac complication    COPD (chronic obstructive pulmonary disease) (Multi)    Cerebrovascular accident (CVA) (Multi)    Erectile dysfunction due to arterial insufficiency    Cigarette smoker    Annual " physical exam        Comprehensive Medical/Surgical/Social/Family History  Social History     Socioeconomic History    Marital status:    Tobacco Use    Smoking status: Former     Current packs/day: 0.00     Average packs/day: 0.5 packs/day for 46.0 years (23.0 ttl pk-yrs)     Types: Cigarettes     Start date: 1977     Quit date: 2023     Years since quittin.3    Smokeless tobacco: Never   Vaping Use    Vaping status: Never Used   Substance and Sexual Activity    Alcohol use: Yes     Alcohol/week: 3.0 standard drinks of alcohol     Types: 3 Cans of beer per week    Drug use: Not Currently     Comment: before everything    Sexual activity: Defer     Social Drivers of Health     Transportation Needs: No Transportation Needs (2024)    OASIS : Transportation     Lack of Transportation (Medical): No     Lack of Transportation (Non-Medical): No     Patient Unable or Declines to Respond: No   Social Connections: Feeling Socially Integrated (2024)    OASIS : Social Isolation     Frequency of experiencing loneliness or isolation: Never        Family History   Problem Relation Name Age of Onset    Diabetes Mother      Cancer Mother      Diabetes Father      Liver disease Father      Esophageal cancer Brother      Throat cancer Maternal Grandfather          Past Surgical History:   Procedure Laterality Date    CARDIAC CATHETERIZATION      CARDIAC SURGERY      HERNIA REPAIR      TONSILLECTOMY      WISDOM TOOTH EXTRACTION          Allergies and Medication List  Allergies   Allergen Reactions    Penicillins Anaphylaxis, Hives, Rash and Swelling          Current Outpatient Medications:     nitroglycerin (Nitrostat) 0.4 mg SL tablet, Place 1 tablet (0.4 mg) under the tongue every 5 minutes if needed for chest pain. UP TO 3 TIMES IF NO RELIEF CALL 911, Disp: 30 tablet, Rfl: 3    albuterol 90 mcg/actuation inhaler, Inhale 1-2 puffs every 6 hours if needed for wheezing., Disp: 18 g, Rfl: 5     "aspirin 81 mg EC tablet, Take 1 tablet (81 mg) by mouth once daily in the morning. Take before meals., Disp: 30 tablet, Rfl: 3    atorvastatin (Lipitor) 80 mg tablet, Take 1 tablet (80 mg) by mouth once daily., Disp: 30 tablet, Rfl: 3    clopidogrel (Plavix) 75 mg tablet, Take 1 tablet (75 mg) by mouth once daily., Disp: 30 tablet, Rfl: 3    levothyroxine (Synthroid, Levoxyl) 100 mcg tablet, Take 1 tablet (100 mcg) by mouth once daily., Disp: 30 tablet, Rfl: 3    lisinopril 20 mg tablet, Take 1 tablet (20 mg) by mouth once daily., Disp: 30 tablet, Rfl: 3    metoprolol succinate XL (Toprol-XL) 25 mg 24 hr tablet, Take 1 tablet (25 mg) by mouth once daily., Disp: 30 tablet, Rfl: 3    rOPINIRole (Requip) 0.5 mg tablet, Take 1 tablet (0.5 mg) by mouth 3 times a day., Disp: 90 tablet, Rfl: 1    semaglutide (Ozempic) 0.25 mg or 0.5 mg(2 mg/1.5 mL) pen injector, Inject 0.25 mg under the skin 1 (one) time per week for 30 days, THEN 0.5 mg 1 (one) time per week., Disp: 3 mL, Rfl: 0    tiotropium (Spiriva with HandiHaler) 18 mcg inhalation capsule, Place 1 capsule (18 mcg) into inhaler and inhale once daily., Disp: 30 capsule, Rfl: 11       Review of Systems  Nothing concerning except as noted in the HPI    Objective   BP (!) 140/96   Pulse 100   Temp 36.5 °C (97.7 °F)   Resp 18   Ht 1.854 m (6' 1\")   Wt 104 kg (229 lb)   SpO2 97%   BMI 30.21 kg/m²     Physical Exam  Constitutional:       General: He is not in acute distress.     Appearance: Normal appearance. He is not ill-appearing.   HENT:      Head: Normocephalic.   Cardiovascular:      Rate and Rhythm: Normal rate and regular rhythm.      Pulses: Normal pulses.      Heart sounds: Normal heart sounds. No murmur heard.  Pulmonary:      Effort: Pulmonary effort is normal.      Breath sounds: Normal breath sounds.   Neurological:      Mental Status: He is alert.   Psychiatric:         Mood and Affect: Mood is anxious. Affect is angry.         Behavior: Behavior " normal.         Assessment/Plan     Problem List Items Addressed This Visit       Hypothyroidism    Overview   - Most recent labs: TSH was 34.22, free T4 was 0.44 (1/17/25)  - Current med: levothyroxine 100 mcg (HISTORY OF NONCOMPLIANCE)         Current Assessment & Plan   - Pt admits to poor compliance with medication due to forgetfulness   - Encouraged better compliance and discussed some strategies to help remember e.g. pill pack   - Restart levothyroxine and recheck labs 3 months          Relevant Medications    levothyroxine (Synthroid, Levoxyl) 100 mcg tablet    Other Relevant Orders    Thyroid Peroxidase (TPO) Antibody    Primary hypertension    Overview   - Current meds - lisinopril 20 mg, metoprolol 25 mg BID          Current Assessment & Plan   - BP is high today and pt admits to poor compliance with his meds due to forgetfulness   - Reminded the pt of the risks of poorly controlled HTN, particularly the risk of ACVD progression and another heart attack or stroke.   - Encouraged better compliance, use pill pack/ daily organizer  - Close follow up for this is recommended - see me in 2-3 weeks          Relevant Medications    lisinopril 20 mg tablet    metoprolol succinate XL (Toprol-XL) 25 mg 24 hr tablet    RESOLVED: SMALLWOOD (dyspnea on exertion)    Bilateral carotid bruits    Overview   - Carotid US 11/16/23 - <50% stenosis b/l           Current Assessment & Plan   - Having some new lightheadedness   - Recheck US         Coronary artery disease of native artery of native heart with stable angina pectoris    Overview   - Following with cardiologist, Dr. Wallace   - Current meds: atorvastatin 80 mg, Plavix 75 mg, aspirin 81 mg, lisinopril 20 mg, metoprolol xl 25 mg   - Cardiac cath 10/30/23 - LM 50%, LAD 90%, Cx 95-99%, RCA moderate diffuse plaque. LVEF 60% (bnp 53).  - CABGx3 was done 12/7/23 by Dr. Cheryl ANDREWS-LAD, RA-OM, SVG-PDA         Current Assessment & Plan   - Pt admits to poor compliance with  "his medication regimen due to forgetfulness   - Discussed the risk of progression of ASCVD leading to another heart attack or stroked and encouraged better compliance   - Recommended dietary modifications and quit smoking  - Will be adding Ozempic for his DM which should provide some added cardioprotection    - Pt denies chest pain, palpitations, SMALLWOOD, orthopnea, PND   - Encouraged regular follow up with his cardiologist          Relevant Medications    aspirin 81 mg EC tablet    clopidogrel (Plavix) 75 mg tablet    metoprolol succinate XL (Toprol-XL) 25 mg 24 hr tablet    Type 2 diabetes mellitus with cardiac complication    Overview   - New dx 11/7/23 with A1c 6.7% (strong fhx T2DM both parents, sister)   - Most recent A1c was 7.5% (1/17/25)         Current Assessment & Plan   - Unclear why he stopped metformin that was rx'd when he was originally dx'd in 2023. He admits he has been overwhelmed and forgetful and was briefly back in intermediate  - Recommend low carb/ low sugar diabetic diet  - Rx sent for Ozempic  - Referred to [facility] Pharmacist to assist with Ozempic compliance   - Follow up for this in 3 months          Relevant Medications    clopidogrel (Plavix) 75 mg tablet    metoprolol succinate XL (Toprol-XL) 25 mg 24 hr tablet    COPD (chronic obstructive pulmonary disease) (Multi)    Overview   - Current med: Advair (taking prn) and abluterol prn          Current Assessment & Plan   - Pt still with SOB and SMALLWOOD   - Will add Spiriva daily   - Encouraged smoking cessation  - Encouraged he follow up with cardiology as well          Relevant Medications    tiotropium (Spiriva with HandiHaler) 18 mcg inhalation capsule    Other Relevant Orders    Disability Placard    Cerebrovascular accident (CVA) (Multi)    Overview   - MRI brain 12/8/23 - showed, \"Subtle 5 mm focus of diffusion restriction consistent with acute to early subacute infarct is present in the dorsal tegmentum of the david...\"         Current Assessment & " Plan   - Pt HIGH risk for another stroke given poor compliance with treatment and lots of risk factors - diabetes, HTN, smoker   - Educated pt about his risk and encouraged better compliance with tx   - Sent refills of his medications   - Encourage smoking cessation   - Close follow up recommended          Cigarette smoker    Overview   - Currently smoking 1 ppd   - Risks: CAD, h/o MI and CVA         Current Assessment & Plan   - Encouraged smoking cessation and reminded pt of risks          Annual physical exam    Overview   - Lives in [place] with wife (Ольга), daughter (Thien)  - Having a hard time adjusting - recently got out of shelter (did 14 years altogether) - going to the [employer] trying to cope.   - Employment - not yet, filing for social security/ disability - PTSD etc (doesn't trust himself on how he'll react)          Current Assessment & Plan   PREVENTIVE CARE SCREENING:  - Labs:  UTD   - Cologuard/ Colonoscopy: Due - deferred   Vaccines:   - Shingles Vaccine (start at 50): Due   - Tetanus (q10yrs): UTD  Lifestyle Modification:  - Discussed DIET - encouraged whole food diet with lots of vegetables, legumes, whole grains, fruits, lean meats, avoid processed foods, high saturated fat/greasy foods, sugary foods and drinks, fast foods/dining out.   - Discussed EXERCISE -   Recommended exercise 3-7 days a week - weight training for bone health and cardiovascular exercise.   - Avoid or quit cigarette smoking   - Limit or abstain from alcohol consumption   - Encouraged pt to get yearly eye and dental exams           Other Visit Diagnoses         Hepatitis    -  Primary    Relevant Orders    Hepatic Function Panel    Hepatitis Panel, Acute    Protime-INR    Gamma-Glutamyl Transferase    Ferritin    JOE with Reflex to NAVJOT    Anti-Mitochondrial Antibody    Anti-Smooth Muscle Antibody    IgG    Liver/Kidney Microsome Type 1 Antibodies, Serum    US abdomen limited liver      Pleurisy with pleural effusion         Relevant Medications    albuterol 90 mcg/actuation inhaler      Mixed hyperlipidemia        Relevant Medications    atorvastatin (Lipitor) 80 mg tablet      Restless leg        Relevant Medications    rOPINIRole (Requip) 0.5 mg tablet      Type 2 diabetes mellitus with other circulatory complication, without long-term current use of insulin        Relevant Medications    semaglutide (Ozempic) 0.25 mg or 0.5 mg(2 mg/1.5 mL) pen injector    Other Relevant Orders    Referral to Clinical Pharmacy    Follow Up In [facility] - PCP      Acute nonintractable headache, unspecified headache type        Relevant Orders    MR brain wo IV contrast      History of stroke        Relevant Orders    MR brain wo IV contrast    Vascular US Carotid Artery Duplex Bilateral      Memory impairment        Relevant Orders    MR brain wo IV contrast      Lightheaded        Relevant Orders    Vascular US Carotid Artery Duplex Bilateral      Non compliance w medication regimen                              Liban Jaramillo - be aware that any referrals discussed should be placed today and tests that were ordered should result in prompt scheduling today.   If not done today-then a phone call for scheduling is expected in a timely manner (within 2 weeks).   If testing is to be done-a result should be available to patient within 2 weeks time unless otherwise specified.   You, the patient or caregiver, are responsible for making sure that what was discussed is actually scheduled and completed.  If suboptimal understanding of results of tests or referral reason-a follow up appointment with me should be made.  If above does not occur-you are to connect with us for an explanation.

## 2025-04-21 NOTE — ASSESSMENT & PLAN NOTE
- Pt HIGH risk for another stroke given poor compliance with treatment and lots of risk factors - diabetes, HTN, smoker   - Educated pt about his risk and encouraged better compliance with tx   - Sent refills of his medications   - Encourage smoking cessation   - Close follow up recommended

## 2025-04-21 NOTE — ASSESSMENT & PLAN NOTE
- Pt still with SOB and SMALLWOOD   - Will add Spiriva daily   - Encouraged smoking cessation  - Encouraged he follow up with cardiology as well

## 2025-04-21 NOTE — ASSESSMENT & PLAN NOTE
- Unclear why he stopped metformin that was rx'd when he was originally dx'd in 2023. He admits he has been overwhelmed and forgetful and was briefly back in alf  - Recommend low carb/ low sugar diabetic diet  - Rx sent for Ozempic  - Referred to [facility] Pharmacist to assist with Ozempic compliance   - Follow up for this in 3 months

## 2025-04-23 DIAGNOSIS — E11.59 TYPE 2 DIABETES MELLITUS WITH CARDIAC COMPLICATION: Primary | ICD-10-CM

## 2025-04-23 LAB
ALBUMIN SERPL-MCNC: 4.5 G/DL (ref 3.6–5.1)
ALBUMIN/GLOB SERPL: 1.7 (CALC) (ref 1–2.5)
ALP SERPL-CCNC: 62 U/L (ref 35–144)
ALT SERPL-CCNC: 89 U/L (ref 9–46)
ANA SER QL IF: NEGATIVE
AST SERPL-CCNC: 74 U/L (ref 10–35)
BILIRUB DIRECT SERPL-MCNC: 0.1 MG/DL
BILIRUB INDIRECT SERPL-MCNC: 0.4 MG/DL (CALC) (ref 0.2–1.2)
BILIRUB SERPL-MCNC: 0.5 MG/DL (ref 0.2–1.2)
FERRITIN SERPL-MCNC: 47 NG/ML (ref 38–380)
GGT SERPL-CCNC: 43 U/L (ref 3–85)
GLOBULIN SER CALC-MCNC: 2.6 G/DL (CALC) (ref 1.9–3.7)
HAV IGM SERPL QL IA: NORMAL
HBV CORE IGM SERPL QL IA: NORMAL
HBV SURFACE AG SERPL QL IA: NORMAL
HCV AB SERPL QL IA: NORMAL
IGG SERPL-MCNC: 1203 MG/DL (ref 600–1640)
INR PPP: 1
LKM-1 IGG SER IA-ACNC: <=20 U
MITOCHONDRIA AB SER QL IF: NEGATIVE
PROT SERPL-MCNC: 7.1 G/DL (ref 6.1–8.1)
PROTHROMBIN TIME: 10.5 SEC (ref 9–11.5)
SMOOTH MUSCLE AB SER QL IF: POSITIVE
SMOOTH MUSCLE AB TITR SER IF: ABNORMAL TITER
THYROPEROXIDASE AB SERPL-ACNC: 331 IU/ML

## 2025-04-23 RX ORDER — DULAGLUTIDE 0.75 MG/.5ML
0.75 INJECTION, SOLUTION SUBCUTANEOUS WEEKLY
Qty: 2 ML | Refills: 0 | Status: SHIPPED | OUTPATIENT
Start: 2025-04-23

## 2025-04-29 ENCOUNTER — HOSPITAL ENCOUNTER (OUTPATIENT)
Dept: RADIOLOGY | Facility: HOSPITAL | Age: 60
Discharge: HOME | End: 2025-04-29
Payer: COMMERCIAL

## 2025-04-30 NOTE — PROGRESS NOTES
Clinical Pharmacy Appointment    Patient ID: Liban Jaramillo is a 59 y.o. male who presents for No chief complaint on file..    Pt is here for First appointment.     Referring Provider: Joselin Simon PA-C  PCP: Joselin Simon PA-C  Last visit with PCP: 4/21/2025   Next visit with PCP: 5/9/2025    Subjective   Medication Reconciliation:  Changed: ***  Added: ***  Discontinued: ***    Drug Interactions  No relevant drug interactions were noted.    Medication System Management  Patient's preferred pharmacy: ***  Adherence/Organization: ***  Affordability/Accessibility: ***      Interval History  Ozempic PA denied    HPI  Type II Diabetes  Current  Pharmacotherapy:   Trulicity 0.75 mg once weekly      SECONDARY PREVENTION  - Statin? Atorvastatin 80 mg daily   LDL:   TC:  - ACE-I/ARB? Lisinopril 20 mg daily   UACR:   Albumin/Creatinine Ratio   Date Value Ref Range Status   12/06/2023   Final     Comment:     One or more analytes used in this calculation is outside of the analytical measurement range. Calculation cannot be performed.      BP:   - Aspirin? Yes       Current monitoring regimen:   Patient is using:      SMBG Fasting Readings:     Hypoglycemia?    Pertinent PMH Review:  - PMH of Pancreatitis: ***  - PMH/FH of Medullary Thyroid Cancer: ***  - PMH of Retinopathy: ***  - PMH of Urinary Tract Infections: ***    Complications:  - ANA: Yes  - CKD: N/A    -The ASCVD Risk score (Tierney GONZALES, et al., 2019) failed to calculate for the following reasons:    Risk score cannot be calculated because patient has a medical history suggesting prior/existing ASCVD      Diet/Lifestyle:         Objective   Allergies[1]  Social History     Social History Narrative    Not on file      Medication Review  Current Outpatient Medications   Medication Instructions    albuterol 90 mcg/actuation inhaler 1-2 puffs, inhalation, Every 6 hours PRN    aspirin 81 mg, oral, Daily before breakfast    atorvastatin (LIPITOR) 80 mg, oral, Daily     clopidogrel (PLAVIX) 75 mg, oral, Daily    levothyroxine (SYNTHROID, LEVOXYL) 100 mcg, oral, Daily    lisinopril 20 mg, oral, Daily    metoprolol succinate XL (TOPROL-XL) 25 mg, oral, Daily    nitroglycerin (NITROSTAT) 0.4 mg, sublingual, Every 5 min PRN, UP TO 3 TIMES IF NO RELIEF CALL 911    rOPINIRole (REQUIP) 0.5 mg, oral, 3 times daily    semaglutide (Ozempic) 0.25 mg or 0.5 mg(2 mg/1.5 mL) pen injector Inject 0.25 mg under the skin 1 (one) time per week for 30 days, THEN 0.5 mg 1 (one) time per week.    tiotropium (SPIRIVA WITH HANDIHALER) 18 mcg, inhalation, Daily RT    Trulicity 0.75 mg, subcutaneous, Weekly      Vitals  BP Readings from Last 2 Encounters:   04/21/25 (!) 140/96   01/13/25 146/88     BMI Readings from Last 1 Encounters:   04/21/25 30.21 kg/m²      Labs  A1C  Lab Results   Component Value Date    HGBA1C 7.5 (H) 01/17/2025    HGBA1C 6.3 (H) 12/06/2023    HGBA1C 6.7 (A) 11/07/2023     BMP  Lab Results   Component Value Date    CALCIUM 8.7 01/17/2025     01/17/2025    K 4.3 01/17/2025    CO2 28 01/17/2025     01/17/2025    BUN 10 01/17/2025    CREATININE 0.89 01/17/2025    EGFR >90 01/17/2025     LFTs  Lab Results   Component Value Date    ALT 89 (H) 04/21/2025    AST 74 (H) 04/21/2025    ALKPHOS 62 04/21/2025    BILITOT 0.5 04/21/2025     FLP  Lab Results   Component Value Date    TRIG 142 01/17/2025    CHOL 162 01/17/2025    LDLCALC 102 (H) 01/17/2025    HDL 31.4 01/17/2025     Urine Microalbumin  Lab Results   Component Value Date    MICROALBCREA  12/06/2023      Comment:      One or more analytes used in this calculation is outside of the analytical measurement range. Calculation cannot be performed.     Weight Management  Wt Readings from Last 3 Encounters:   04/21/25 104 kg (229 lb)   01/13/25 108 kg (238 lb 14.4 oz)   08/27/24 101 kg (222 lb)      There is no height or weight on file to calculate BMI.     Assessment/Plan   Problem List Items Addressed This Visit     None    Patient's goal A1c is < 7%.  Is pt at goal? No, 7.5%  Patient's SMBGs are ***.     Rationale for plan: Patient uncontrolled with recent A1c of 7.5%.     Medication Changes:  CONTINUE    STOP    START    INCREASE    DECREASE      Future Considerations:      Monitoring and Education:      Clinical Pharmacist follow-up: ***, Telehealth visit    Continue all meds under the continuation of care with the referring provider and clinical pharmacy team.    Abhi Mares PharmD      Verbal consent to manage patient's drug therapy was obtained from the patient. They were informed they may decline to participate or withdraw from participation in pharmacy services at any time.         [1]   Allergies  Allergen Reactions    Penicillins Anaphylaxis, Hives, Rash and Swelling

## 2025-05-01 ENCOUNTER — APPOINTMENT (OUTPATIENT)
Dept: PHARMACY | Facility: HOSPITAL | Age: 60
End: 2025-05-01
Payer: COMMERCIAL

## 2025-05-06 ENCOUNTER — HOSPITAL ENCOUNTER (OUTPATIENT)
Dept: RADIOLOGY | Facility: HOSPITAL | Age: 60
Discharge: HOME | End: 2025-05-06
Payer: COMMERCIAL

## 2025-05-06 ENCOUNTER — APPOINTMENT (OUTPATIENT)
Dept: RADIOLOGY | Facility: HOSPITAL | Age: 60
End: 2025-05-06
Payer: COMMERCIAL

## 2025-05-06 DIAGNOSIS — Z86.73 HISTORY OF STROKE: ICD-10-CM

## 2025-05-06 DIAGNOSIS — R42 LIGHTHEADED: ICD-10-CM

## 2025-05-06 DIAGNOSIS — K75.9 HEPATITIS: ICD-10-CM

## 2025-05-06 PROCEDURE — 76705 ECHO EXAM OF ABDOMEN: CPT

## 2025-05-06 PROCEDURE — 93880 EXTRACRANIAL BILAT STUDY: CPT

## 2025-05-07 ENCOUNTER — APPOINTMENT (OUTPATIENT)
Dept: RADIOLOGY | Facility: HOSPITAL | Age: 60
End: 2025-05-07
Payer: COMMERCIAL

## 2025-05-09 ENCOUNTER — APPOINTMENT (OUTPATIENT)
Dept: PRIMARY CARE | Facility: CLINIC | Age: 60
End: 2025-05-09
Payer: COMMERCIAL

## 2025-05-21 ENCOUNTER — APPOINTMENT (OUTPATIENT)
Dept: RADIOLOGY | Facility: HOSPITAL | Age: 60
End: 2025-05-21
Payer: COMMERCIAL

## 2025-05-21 ENCOUNTER — HOSPITAL ENCOUNTER (EMERGENCY)
Facility: HOSPITAL | Age: 60
Discharge: HOME | End: 2025-05-21
Attending: STUDENT IN AN ORGANIZED HEALTH CARE EDUCATION/TRAINING PROGRAM
Payer: COMMERCIAL

## 2025-05-21 VITALS
DIASTOLIC BLOOD PRESSURE: 75 MMHG | WEIGHT: 229 LBS | HEIGHT: 73 IN | OXYGEN SATURATION: 96 % | RESPIRATION RATE: 15 BRPM | BODY MASS INDEX: 30.35 KG/M2 | SYSTOLIC BLOOD PRESSURE: 142 MMHG | HEART RATE: 58 BPM | TEMPERATURE: 97.3 F

## 2025-05-21 DIAGNOSIS — T07.XXXA ABRASIONS OF MULTIPLE SITES: ICD-10-CM
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
## 2025-05-21 DIAGNOSIS — S00.03XA HEMATOMA OF SCALP, INITIAL ENCOUNTER: ICD-10-CM

## 2025-05-21 DIAGNOSIS — S49.91XA INJURY OF RIGHT SHOULDER, INITIAL ENCOUNTER: ICD-10-CM

## 2025-05-21 LAB
ABO GROUP (TYPE) IN BLOOD: NORMAL
ALBUMIN SERPL BCP-MCNC: 3.8 G/DL (ref 3.4–5)
ALP SERPL-CCNC: 55 U/L (ref 33–120)
ALT SERPL W P-5'-P-CCNC: 73 U/L (ref 10–52)
ANION GAP SERPL CALC-SCNC: 10 MMOL/L (ref 10–20)
ANTIBODY SCREEN: NORMAL
AST SERPL W P-5'-P-CCNC: 52 U/L (ref 9–39)
BASOPHILS # BLD AUTO: 0.03 X10*3/UL (ref 0–0.1)
BASOPHILS NFR BLD AUTO: 0.4 %
BILIRUB SERPL-MCNC: 0.5 MG/DL (ref 0–1.2)
BUN SERPL-MCNC: 10 MG/DL (ref 6–23)
CALCIUM SERPL-MCNC: 9.3 MG/DL (ref 8.6–10.3)
CHLORIDE SERPL-SCNC: 105 MMOL/L (ref 98–107)
CO2 SERPL-SCNC: 23 MMOL/L (ref 21–32)
CREAT SERPL-MCNC: 0.77 MG/DL (ref 0.5–1.3)
EGFRCR SERPLBLD CKD-EPI 2021: >90 ML/MIN/1.73M*2
EOSINOPHIL # BLD AUTO: 0.24 X10*3/UL (ref 0–0.7)
EOSINOPHIL NFR BLD AUTO: 2.9 %
ERYTHROCYTE [DISTWIDTH] IN BLOOD BY AUTOMATED COUNT: 13.9 % (ref 11.5–14.5)
ETHANOL SERPL-MCNC: <10 MG/DL
GLUCOSE SERPL-MCNC: 103 MG/DL (ref 74–99)
HCT VFR BLD AUTO: 39.4 % (ref 41–52)
HGB BLD-MCNC: 13.1 G/DL (ref 13.5–17.5)
IMM GRANULOCYTES # BLD AUTO: 0.03 X10*3/UL (ref 0–0.7)
IMM GRANULOCYTES NFR BLD AUTO: 0.4 % (ref 0–0.9)
INR PPP: 1.1 (ref 0.9–1.1)
LACTATE SERPL-SCNC: 1.3 MMOL/L (ref 0.4–2)
LYMPHOCYTES # BLD AUTO: 1.51 X10*3/UL (ref 1.2–4.8)
LYMPHOCYTES NFR BLD AUTO: 18 %
MCH RBC QN AUTO: 29.6 PG (ref 26–34)
MCHC RBC AUTO-ENTMCNC: 33.2 G/DL (ref 32–36)
MCV RBC AUTO: 89 FL (ref 80–100)
MONOCYTES # BLD AUTO: 0.99 X10*3/UL (ref 0.1–1)
MONOCYTES NFR BLD AUTO: 11.8 %
NEUTROPHILS # BLD AUTO: 5.59 X10*3/UL (ref 1.2–7.7)
NEUTROPHILS NFR BLD AUTO: 66.5 %
NRBC BLD-RTO: 0 /100 WBCS (ref 0–0)
PLATELET # BLD AUTO: 242 X10*3/UL (ref 150–450)
POTASSIUM SERPL-SCNC: 4 MMOL/L (ref 3.5–5.3)
PROT SERPL-MCNC: 6.7 G/DL (ref 6.4–8.2)
PROTHROMBIN TIME: 12.2 SECONDS (ref 9.8–12.4)
RBC # BLD AUTO: 4.42 X10*6/UL (ref 4.5–5.9)
RH FACTOR (ANTIGEN D): NORMAL
SODIUM SERPL-SCNC: 134 MMOL/L (ref 136–145)
WBC # BLD AUTO: 8.4 X10*3/UL (ref 4.4–11.3)

## 2025-05-21 PROCEDURE — 72125 CT NECK SPINE W/O DYE: CPT

## 2025-05-21 PROCEDURE — 73030 X-RAY EXAM OF SHOULDER: CPT | Mod: RIGHT SIDE | Performed by: RADIOLOGY

## 2025-05-21 PROCEDURE — 72125 CT NECK SPINE W/O DYE: CPT | Performed by: RADIOLOGY

## 2025-05-21 PROCEDURE — 86901 BLOOD TYPING SEROLOGIC RH(D): CPT | Performed by: PHYSICIAN ASSISTANT

## 2025-05-21 PROCEDURE — 71260 CT THORAX DX C+: CPT

## 2025-05-21 PROCEDURE — 70450 CT HEAD/BRAIN W/O DYE: CPT

## 2025-05-21 PROCEDURE — 70450 CT HEAD/BRAIN W/O DYE: CPT | Performed by: RADIOLOGY

## 2025-05-21 PROCEDURE — G0390 TRAUMA RESPONS W/HOSP CRITI: HCPCS

## 2025-05-21 PROCEDURE — 71260 CT THORAX DX C+: CPT | Mod: FOREIGN READ | Performed by: RADIOLOGY

## 2025-05-21 PROCEDURE — 73030 X-RAY EXAM OF SHOULDER: CPT | Mod: RT

## 2025-05-21 PROCEDURE — 82077 ASSAY SPEC XCP UR&BREATH IA: CPT | Performed by: PHYSICIAN ASSISTANT

## 2025-05-21 PROCEDURE — 36415 COLL VENOUS BLD VENIPUNCTURE: CPT | Performed by: PHYSICIAN ASSISTANT

## 2025-05-21 PROCEDURE — 72128 CT CHEST SPINE W/O DYE: CPT | Mod: FOREIGN READ | Performed by: RADIOLOGY

## 2025-05-21 PROCEDURE — 2500000004 HC RX 250 GENERAL PHARMACY W/ HCPCS (ALT 636 FOR OP/ED): Mod: JZ | Performed by: PHYSICIAN ASSISTANT

## 2025-05-21 PROCEDURE — 80053 COMPREHEN METABOLIC PANEL: CPT | Performed by: PHYSICIAN ASSISTANT

## 2025-05-21 PROCEDURE — 2550000001 HC RX 255 CONTRASTS: Performed by: PHYSICIAN ASSISTANT

## 2025-05-21 PROCEDURE — 74177 CT ABD & PELVIS W/CONTRAST: CPT | Mod: FOREIGN READ | Performed by: RADIOLOGY

## 2025-05-21 PROCEDURE — 96374 THER/PROPH/DIAG INJ IV PUSH: CPT | Mod: 59

## 2025-05-21 PROCEDURE — 99285 EMERGENCY DEPT VISIT HI MDM: CPT | Mod: 25 | Performed by: STUDENT IN AN ORGANIZED HEALTH CARE EDUCATION/TRAINING PROGRAM

## 2025-05-21 PROCEDURE — 85025 COMPLETE CBC W/AUTO DIFF WBC: CPT | Performed by: PHYSICIAN ASSISTANT

## 2025-05-21 PROCEDURE — 73080 X-RAY EXAM OF ELBOW: CPT | Mod: RIGHT SIDE | Performed by: RADIOLOGY

## 2025-05-21 PROCEDURE — 73080 X-RAY EXAM OF ELBOW: CPT | Mod: RT

## 2025-05-21 PROCEDURE — 72131 CT LUMBAR SPINE W/O DYE: CPT | Mod: FOREIGN READ | Performed by: RADIOLOGY

## 2025-05-21 PROCEDURE — 85610 PROTHROMBIN TIME: CPT | Performed by: PHYSICIAN ASSISTANT

## 2025-05-21 PROCEDURE — 83605 ASSAY OF LACTIC ACID: CPT | Performed by: PHYSICIAN ASSISTANT

## 2025-05-21 RX ORDER — FENTANYL CITRATE 50 UG/ML
50 INJECTION, SOLUTION INTRAMUSCULAR; INTRAVENOUS ONCE
Status: COMPLETED | OUTPATIENT
Start: 2025-05-21 | End: 2025-05-21

## 2025-05-21 RX ADMIN — IOHEXOL 100 ML: 350 INJECTION, SOLUTION INTRAVENOUS at 01:51

## 2025-05-21 RX ADMIN — FENTANYL CITRATE 50 MCG: 0.05 INJECTION, SOLUTION INTRAMUSCULAR; INTRAVENOUS at 03:57

## 2025-05-21 ASSESSMENT — LIFESTYLE VARIABLES
HAVE PEOPLE ANNOYED YOU BY CRITICIZING YOUR DRINKING: NO
EVER HAD A DRINK FIRST THING IN THE MORNING TO STEADY YOUR NERVES TO GET RID OF A HANGOVER: NO
TOTAL SCORE: 0
EVER FELT BAD OR GUILTY ABOUT YOUR DRINKING: NO
HAVE YOU EVER FELT YOU SHOULD CUT DOWN ON YOUR DRINKING: NO

## 2025-05-21 ASSESSMENT — PAIN DESCRIPTION - DESCRIPTORS: DESCRIPTORS: ACHING;DULL;STABBING

## 2025-05-21 ASSESSMENT — COLUMBIA-SUICIDE SEVERITY RATING SCALE - C-SSRS
6. HAVE YOU EVER DONE ANYTHING, STARTED TO DO ANYTHING, OR PREPARED TO DO ANYTHING TO END YOUR LIFE?: NO
1. IN THE PAST MONTH, HAVE YOU WISHED YOU WERE DEAD OR WISHED YOU COULD GO TO SLEEP AND NOT WAKE UP?: NO
2. HAVE YOU ACTUALLY HAD ANY THOUGHTS OF KILLING YOURSELF?: NO

## 2025-05-21 ASSESSMENT — PAIN SCALES - GENERAL: PAINLEVEL_OUTOF10: 4

## 2025-05-21 ASSESSMENT — PAIN - FUNCTIONAL ASSESSMENT: PAIN_FUNCTIONAL_ASSESSMENT: 0-10

## 2025-05-21 NOTE — ED PROVIDER NOTES
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  He states he fell down full flight of stairs because the width of the stairs is narrow and he slipped sliding down his right side the entire length of the stairs.  Initially stated he did not hit his head but later said he bumped his head on the right side.  No LOC.  Chiefly complains of right upper arm/shoulder and right elbow pain.  Limited trauma activation.  Received Dilaudid from EMS en route.      History provided by:  Patient   used: No            Patient History   Medical History[1]  Surgical History[2]  Family History[3]  Social History[4]    Physical Exam   ED Triage Vitals   Temperature Heart Rate Respirations BP   05/21/25 0130 05/21/25 0130 05/21/25 0130 05/21/25 0130   36.2 °C (97.1 °F) 89 18 149/87      Pulse Ox Temp Source Heart Rate Source Patient Position   05/21/25 0130 05/21/25 0130 05/21/25 0130 --   98 % Temporal Monitor       BP Location FiO2 (%)     05/21/25 0130 --     Right arm        Physical Exam    Primary Survey:  Airway: Intact & patent  Breathing: Equal breath sounds bilaterally  Circulation: 2+ radial and DP pulses bilaterally  Disability: GCS 15.  Gross motor and sensation intact in the bilateral upper and lower extremities.  Exposure: Patient fully exposed    Secondary Survey:    Gen.: Vitals noted no distress  Head: Normocephalic, right parietal scalp hematoma present.  Pupils PERRL, EOMI. Gaze is conjugate. No orbital ridge bony step-offs, or tenderness. No entrapment signs. Midface is stable. No nasal septal hematoma. No evidence of intraoral injury. Posterior oropharynx patent. No jaw malocclusion.  No intraoral lesions.  No blood or CSF leakage of the EACs.  Neck: Cervical collar not in place.  No midline or paraspinal tenderness. No step-off or crepitance.  Cardiac: Regular rate and rhythm. No murmur.  Lungs:  Clear to auscultation bilaterally with good aeration. No chest wall tenderness. No crepitus.  No flail segments.  No bruising or abrasions to the anterior chest wall.  Abdomen: Soft, nontender, nondistended.  No bruising or abrasions noted.  No distention.  Back: No midline T or L-spine tenderness palpation, step-offs, or deformities.  No lacerations, abrasions, or bruising noted.  Extremities: There is tenderness to palpation of the right AC joint and right superior deltoid.  No effusion.  There is tenderness to palpation of the head of the radius and a superficial abrasion of the right elbow.  There is a superficial abrasion of the right MTP area with no underlying bony tenderness.  Skin: Superficial abrasions.  No lacerations.  Neuro: GCS is 15. Alert and oriented to person, place, time.  Face symmetric, speech fluent.  Gross motor and sensory function intact in the bilateral upper and lower extremities.    ED Course & MDM   Diagnoses as of 05/21/25 0510   Fall, initial encounter   Injury of right shoulder, initial encounter   Abrasions of multiple sites   Hematoma of scalp, initial encounter                 No data recorded     [place] Coma Scale Score: 15 (05/21/25 0136 : Jenifer Horn RN)       NIH Stroke Scale: 0 (05/21/25 0136 : Jenifer Horn RN)                   Medical Decision Making  DDx: Subdural/epidural hematoma, subarachnoid hemorrhage, spinal/vertebral fracture, rib fracture, chest wall contusion, pneumothorax/hemothorax, intra-abdominal injury such as splenic or renal laceration, viscous injury    Patient presents as limited trauma activation due to hit head on Plavix and other injuries after a fall down a full flight of stairs.  Given the fall down full flight of stairs on Plavix patient was pan scanned.  X-ray of the shoulder is negative and the remainder of his scan was largely not indicative of any acute traumatic findings although ligamentous derangement of the shoulder not  "excluded so the patient was given a shoulder sling.  He was given 50 mcg IV fentanyl for pain control.  At this time feel patient can be discharged follow-up with orthopedic walk-in clinic.  Instructed to return to the nearest ED if any concerns or new or worsening symptoms. Patient verbalized understanding and agreement with plan. Discharged in stable condition.  This visit was staffed with the attending physician Dr. Donato.      Disclaimer: This note was dictated using speech recognition software. An attempt at proofreading was made to minimize errors. Minor errors in transcription may be present.    Amount and/or Complexity of Data Reviewed  Labs: ordered.  Radiology: ordered.        Procedure  Critical Care    Performed by: Mikhail Salazar PA-C  Authorized by: Yoan Donato MD    Critical care provider statement:     Critical care time (minutes):  23    Critical care time was exclusive of:  Separately billable procedures and treating other patients and teaching time    Critical care was necessary to treat or prevent imminent or life-threatening deterioration of the following conditions:  Trauma    Critical care was time spent personally by me on the following activities:  Examination of patient, ordering and review of radiographic studies, ordering and review of laboratory studies and re-evaluation of patient's condition         [1]   Past Medical History:  Diagnosis Date    Anxiety     Arthritis     Cervical disc disease     COPD (chronic obstructive pulmonary disease) (Multi)     Coronary artery disease     Diabetes mellitus (Multi)     \"diet controlled\" and is not testing his glucose    Disease of thyroid gland     GERD (gastroesophageal reflux disease)     Hyperlipidemia     Hypertension     Myocardial infarction (Multi)     Nephrolithiasis     PTSD (post-traumatic stress disorder)     Skin disorder     states rash on occassion    Wears glasses     [place] teeth extracted    [2]   Past Surgical " History:  Procedure Laterality Date    CARDIAC CATHETERIZATION      CARDIAC SURGERY      HERNIA REPAIR      TONSILLECTOMY      WISDOM TOOTH EXTRACTION     [3]   Family History  Problem Relation Name Age of Onset    Diabetes Mother      Cancer Mother      Diabetes Father      Liver disease Father      Esophageal cancer Brother      Throat cancer Maternal Grandfather     [4]   Social History  Tobacco Use    Smoking status: Former     Current packs/day: 0.00     Average packs/day: 0.5 packs/day for 46.0 years (23.0 ttl pk-yrs)     Types: Cigarettes     Start date: 1977     Quit date: 2023     Years since quittin.4    Smokeless tobacco: Never   Vaping Use    Vaping status: Never Used   Substance Use Topics    Alcohol use: Yes     Alcohol/week: 3.0 standard drinks of alcohol     Types: 3 Cans of beer per week    Drug use: Not Currently     Comment: before everything        Mikhail Salazar PA-C  25 0512

## 2025-05-23 ENCOUNTER — APPOINTMENT (OUTPATIENT)
Dept: ORTHOPEDIC SURGERY | Facility: CLINIC | Age: 60
End: 2025-05-23
Payer: COMMERCIAL

## 2025-05-23 DIAGNOSIS — E11.59 TYPE 2 DIABETES MELLITUS WITH CARDIAC COMPLICATION: ICD-10-CM

## 2025-05-23 RX ORDER — DULAGLUTIDE 0.75 MG/.5ML
0.75 INJECTION, SOLUTION SUBCUTANEOUS WEEKLY
Qty: 2 ML | Refills: 0 | Status: SHIPPED | OUTPATIENT
Start: 2025-05-23

## 2025-06-14 DIAGNOSIS — E11.59 TYPE 2 DIABETES MELLITUS WITH CARDIAC COMPLICATION: ICD-10-CM

## 2025-06-16 RX ORDER — DULAGLUTIDE 0.75 MG/.5ML
0.75 INJECTION, SOLUTION SUBCUTANEOUS WEEKLY
Qty: 2 ML | Refills: 0 | Status: SHIPPED | OUTPATIENT
Start: 2025-06-16 | End: 2025-06-18 | Stop reason: SDUPTHER

## 2025-06-17 DIAGNOSIS — E03.9 HYPOTHYROIDISM, UNSPECIFIED TYPE: ICD-10-CM

## 2025-06-17 DIAGNOSIS — I25.118 CORONARY ARTERY DISEASE OF NATIVE ARTERY OF NATIVE HEART WITH STABLE ANGINA PECTORIS: ICD-10-CM

## 2025-06-17 DIAGNOSIS — E78.2 MIXED HYPERLIPIDEMIA: ICD-10-CM

## 2025-06-17 DIAGNOSIS — I10 PRIMARY HYPERTENSION: ICD-10-CM

## 2025-06-17 DIAGNOSIS — J44.9 CHRONIC OBSTRUCTIVE PULMONARY DISEASE, UNSPECIFIED COPD TYPE (MULTI): ICD-10-CM

## 2025-06-17 DIAGNOSIS — E11.59 TYPE 2 DIABETES MELLITUS WITH CARDIAC COMPLICATION: ICD-10-CM

## 2025-06-17 DIAGNOSIS — J90 PLEURISY WITH PLEURAL EFFUSION: ICD-10-CM

## 2025-06-17 DIAGNOSIS — G25.81 RESTLESS LEG: ICD-10-CM

## 2025-06-17 RX ORDER — ALBUTEROL SULFATE 90 UG/1
INHALANT RESPIRATORY (INHALATION)
Qty: 54 G | Refills: 11 | Status: SHIPPED | OUTPATIENT
Start: 2025-06-17

## 2025-06-18 RX ORDER — TIOTROPIUM BROMIDE 18 UG/1
CAPSULE ORAL; RESPIRATORY (INHALATION)
Qty: 90 CAPSULE | Refills: 11 | Status: SHIPPED | OUTPATIENT
Start: 2025-06-18

## 2025-06-18 RX ORDER — LEVOTHYROXINE SODIUM 100 UG/1
TABLET ORAL
Qty: 90 TABLET | Refills: 11 | Status: SHIPPED | OUTPATIENT
Start: 2025-06-18

## 2025-06-18 RX ORDER — LISINOPRIL 20 MG/1
TABLET ORAL
Qty: 90 TABLET | Refills: 11 | Status: SHIPPED | OUTPATIENT
Start: 2025-06-18

## 2025-06-18 RX ORDER — DULAGLUTIDE 0.75 MG/.5ML
INJECTION, SOLUTION SUBCUTANEOUS
Qty: 6 ML | Refills: 11 | Status: SHIPPED | OUTPATIENT
Start: 2025-06-18

## 2025-06-18 RX ORDER — CLOPIDOGREL BISULFATE 75 MG/1
TABLET ORAL
Qty: 90 TABLET | Refills: 1 | Status: SHIPPED | OUTPATIENT
Start: 2025-06-18

## 2025-06-18 RX ORDER — ROPINIROLE 0.5 MG/1
TABLET, FILM COATED ORAL
Qty: 270 TABLET | Refills: 11 | Status: SHIPPED | OUTPATIENT
Start: 2025-06-18

## 2025-06-18 RX ORDER — ATORVASTATIN CALCIUM 80 MG/1
TABLET, FILM COATED ORAL
Qty: 90 TABLET | Refills: 1 | Status: SHIPPED | OUTPATIENT
Start: 2025-06-18

## 2025-06-18 RX ORDER — METOPROLOL SUCCINATE 25 MG/1
TABLET, EXTENDED RELEASE ORAL
Qty: 90 TABLET | Refills: 11 | Status: SHIPPED | OUTPATIENT
Start: 2025-06-18

## 2025-06-20 ENCOUNTER — APPOINTMENT (OUTPATIENT)
Dept: ORTHOPEDIC SURGERY | Facility: CLINIC | Age: 60
End: 2025-06-20
Payer: COMMERCIAL

## 2025-07-03 ENCOUNTER — APPOINTMENT (OUTPATIENT)
Dept: UROLOGY | Facility: CLINIC | Age: 60
End: 2025-07-03
Payer: COMMERCIAL

## 2025-07-08 ENCOUNTER — APPOINTMENT (OUTPATIENT)
Dept: UROLOGY | Facility: CLINIC | Age: 60
End: 2025-07-08
Payer: COMMERCIAL

## 2025-07-09 ENCOUNTER — TELEPHONE (OUTPATIENT)
Dept: UROLOGY | Facility: CLINIC | Age: 60
End: 2025-07-09
Payer: COMMERCIAL

## 2025-08-20 ENCOUNTER — DOCUMENTATION (OUTPATIENT)
Dept: CARDIOLOGY | Facility: HOSPITAL | Age: 60
End: 2025-08-20
Payer: COMMERCIAL

## (undated) DEVICE — TIP, SUCTION, YANKAUER, FLEXIBLE

## (undated) DEVICE — SUTURE, VICRYL, 4-0, 18 IN, UNDYED BR PS-2

## (undated) DEVICE — TUBING, SUCTION, CONNECTING, NON-CONDUCTIVE, SURE GRIP CONNECTORS, 3/16 X 18 IN, PVC

## (undated) DEVICE — MONITORING KIT, TRANSDUCER, RETROGRADE, MPS, W/EXTENSION, LF

## (undated) DEVICE — CANNULA, EOPA 20F W/O GUIDEWIRE

## (undated) DEVICE — TUBING, INSUFFLATION, LAPAROSCOPIC, FILTER, 10 FT

## (undated) DEVICE — FILTER, IV, BLOOD, MICROAGGREGATE, 40 MIC, RBC TRANSFUSION

## (undated) DEVICE — ELECTRODE, MULTIFUNCTION, QUICK-COMBO, EDGE SYSTEM, 2 FT

## (undated) DEVICE — PACING CABLE, EXTENSION, 12 FT BLUE, DISPOSABLE

## (undated) DEVICE — DRAPE, SHEET, XL

## (undated) DEVICE — CANNULA, VENOUS 2 STAGE 32/40

## (undated) DEVICE — CLIP, LIGATING, HORIZON, MEDIUM, TITANIUM

## (undated) DEVICE — DRAPE, SHEET, CARDIOVASCULAR, ANTIMICROBIAL, W/ANESTHESIA SCREEN, IOBAN 2, STERI DRAPE, 107 X 133 IN, DISPOSABLE, FABRIC, BLUE, STERILE

## (undated) DEVICE — GOWN, SURGICAL, ROYAL SILK, LG, STERILE

## (undated) DEVICE — SUTURE, PROLENE, 7-0, 30 IN, BV 175-8, BLUE

## (undated) DEVICE — SYRINGE, 20 CC, LUER LOCK

## (undated) DEVICE — SOLUTION, SODIUM CHLORIDE 0.9%, 3000ML, BAG

## (undated) DEVICE — SUTURE, PROLENE, 3-0, 36 IN, SH, DA, BLUE

## (undated) DEVICE — CONNECTOR, STRAIGHT, 0.5 X 0.5 IN

## (undated) DEVICE — BAG, DECANTER

## (undated) DEVICE — STRAP, VELCRO, BODY, 4 X 60IN, NS

## (undated) DEVICE — SENSOR, OXYGEN, ADULT, INVOS KIT

## (undated) DEVICE — INSERT, CLAMP, FOGARTY, SOFTJAW, 86MM, LF

## (undated) DEVICE — SUTURE, PROLENE 4-0, TAPER POINT, SH-1 BLUE 30 INCH

## (undated) DEVICE — LEAD, PACING, MYOCARDIAL, BIPOLAR, TEMPORARY

## (undated) DEVICE — BLANKET, WATER, MUL-T-PAD, 15 X 22 IN, DISPOSABLE

## (undated) DEVICE — Device

## (undated) DEVICE — OXYGENATOR FX 25, W/HR, ARTERIAL FILTER

## (undated) DEVICE — SUTURE, PROLENE, 6-0, 30 IN, C-1, CV-11, BLUE

## (undated) DEVICE — GLOVE, SURGICAL, PROTEXIS PI , 7.5, PF, LF

## (undated) DEVICE — BLOWER MISTER KIT, CLEARVIEW, MALLEABLE SHAFT, W/TUBING, 16.5 CM

## (undated) DEVICE — SHUNT, SENSOR

## (undated) DEVICE — CASSETTE, BLOOD, PLEGIC SET

## (undated) DEVICE — PRE-CLEAN KIT, BEDSIDE, FIRST STEP

## (undated) DEVICE — SUTURE, VICRYL, 3-0, 27 IN, CT-1, UNDYED

## (undated) DEVICE — TOWEL, OR, 17 X 27, 4 PK, WHITE, STERILE

## (undated) DEVICE — NEEDLE, SAFETY, 18 G X 1.5 IN

## (undated) DEVICE — PACING CABLE, EXTENSION, 12 FT BEIGE, DISPOSABLE

## (undated) DEVICE — SOLUTION, INJECTION, USP, PLASMALYTE A, PH 7.4, TYPE 1, 1000 ML, BAG

## (undated) DEVICE — HANDLE, PH, FOR YANKAUER SUCTION DEVICE

## (undated) DEVICE — RETRACTOR, SUTURE, HOLDING, INSERT, OCTOBASE, DISPOSABLE

## (undated) DEVICE — STATLOCK, FOLEY SECURE, 3-WAY

## (undated) DEVICE — KNIFE, MICRO, 15 DEG BLADE AND TIP, DISP

## (undated) DEVICE — RESERVOIRE, ATR 120 COLLECTION

## (undated) DEVICE — APPLICATOR, CHLORAPREP, W/ORANGE TINT, 26ML

## (undated) DEVICE — MICROCOAGULATION TEST, ACT+ TEST CUVETTE

## (undated) DEVICE — SET, AUTOTRANSFUSION, AT1

## (undated) DEVICE — SYRINGE, 1 CC, TUBERCULIN, LUER SLIP

## (undated) DEVICE — SUTURE, PROLENE, 5-0, 24 IN, C1, DA, BLUE

## (undated) DEVICE — SPONGE, LAP, XRAY DECT, 18IN X 18IN, W/MASTER DMT, STERILE

## (undated) DEVICE — GEL, ECOVUE, ULTRASOUND, 20GRAM, STERILE

## (undated) DEVICE — GOWN, SURGICAL, ROYAL SILK, XL, STERILE

## (undated) DEVICE — ELECTRODE, ELECTROSURGICAL, BLADE, 4IN, UN-INSULATED

## (undated) DEVICE — HANDLE, LITE EZ, PLASTIC, DISP, LF

## (undated) DEVICE — SYRINGE, 10 CC, LUER LOCK

## (undated) DEVICE — SUTURE, ETHIBOND, XTRA, 30 IN, 0, CT-1, GREEN

## (undated) DEVICE — TAPE, PAPER, SURGICAL, MICROPORE, 3 IN X 10 YD, LF

## (undated) DEVICE — PROTECTOR, NERVE, ULNAR, PINK

## (undated) DEVICE — ATS SUCTION LINE

## (undated) DEVICE — PUNCH, AORTIC 4MM, BULLET TIP

## (undated) DEVICE — BLADE, STERNUM, 32MM CUT EDGE

## (undated) DEVICE — TOWEL PACK 10-PK

## (undated) DEVICE — COVER, BACK TABLE

## (undated) DEVICE — DEVICE, ENDOSCOPIC VESSEL HARVESTING, SAPHENA VENAPAX

## (undated) DEVICE — CATHETER, THORACIC, STRAIGHT, ADULT, 28 FR, PVC

## (undated) DEVICE — SUTURE, VICRYL, 0, 36 IN, CTX, VIOLET

## (undated) DEVICE — LABEL KIT, MEDICATION, OR EMC, STERILE

## (undated) DEVICE — SUTURE, STEEL, 7, 18 IN, CCS, SILVER

## (undated) DEVICE — CLIP, SPRING, BULLDOG, FOGARTY, SOFT JAW, 6 MM, LF

## (undated) DEVICE — SPONGE, HEMOSTATIC, CELLULOSE, SURGICEL, NU-KNIT, 3 X 4 IN

## (undated) DEVICE — CLIP, LIGATING, HORIZON, WIDE SLOT, SMALL, TITANIUM

## (undated) DEVICE — TUBING PACK, OXYGENATOR, ADULT

## (undated) DEVICE — MANIFOLD, 4 PORT NEPTUNE STANDARD